# Patient Record
Sex: FEMALE | Race: WHITE | NOT HISPANIC OR LATINO | Employment: OTHER | URBAN - METROPOLITAN AREA
[De-identification: names, ages, dates, MRNs, and addresses within clinical notes are randomized per-mention and may not be internally consistent; named-entity substitution may affect disease eponyms.]

---

## 2020-11-05 ENCOUNTER — OFFICE VISIT (OUTPATIENT)
Dept: ORTHOPEDIC SURGERY | Facility: CLINIC | Age: 75
End: 2020-11-05

## 2020-11-05 VITALS
HEART RATE: 71 BPM | BODY MASS INDEX: 28.35 KG/M2 | DIASTOLIC BLOOD PRESSURE: 68 MMHG | WEIGHT: 160 LBS | HEIGHT: 63 IN | SYSTOLIC BLOOD PRESSURE: 112 MMHG

## 2020-11-05 DIAGNOSIS — M25.511 ACUTE PAIN OF RIGHT SHOULDER: Primary | ICD-10-CM

## 2020-11-05 DIAGNOSIS — M19.111 POST-TRAUMATIC OSTEOARTHRITIS, RIGHT SHOULDER: ICD-10-CM

## 2020-11-05 PROCEDURE — 99203 OFFICE O/P NEW LOW 30 MIN: CPT | Performed by: NURSE PRACTITIONER

## 2020-11-05 PROCEDURE — 73030 X-RAY EXAM OF SHOULDER: CPT | Performed by: NURSE PRACTITIONER

## 2020-11-05 RX ORDER — NITROGLYCERIN 0.4 MG/1
0.4 TABLET SUBLINGUAL
COMMUNITY

## 2020-11-05 RX ORDER — INFLUENZA A VIRUS A/MICHIGAN/45/2015 X-275 (H1N1) ANTIGEN (FORMALDEHYDE INACTIVATED), INFLUENZA A VIRUS A/SINGAPORE/INFIMH-16-0019/2016 IVR-186 (H3N2) ANTIGEN (FORMALDEHYDE INACTIVATED), INFLUENZA B VIRUS B/PHUKET/3073/2013 ANTIGEN (FORMALDEHYDE INACTIVATED), AND INFLUENZA B VIRUS B/MARYLAND/15/2016 BX-69A ANTIGEN (FORMALDEHYDE INACTIVATED) 60; 60; 60; 60 UG/.7ML; UG/.7ML; UG/.7ML; UG/.7ML
INJECTION, SUSPENSION INTRAMUSCULAR
COMMUNITY
Start: 2020-10-04 | End: 2021-01-29

## 2020-11-05 RX ORDER — SIMVASTATIN 10 MG
10 TABLET ORAL EVERY OTHER DAY
COMMUNITY
Start: 2020-10-15

## 2020-11-05 RX ORDER — LOSARTAN POTASSIUM AND HYDROCHLOROTHIAZIDE 12.5; 1 MG/1; MG/1
1 TABLET ORAL EVERY MORNING
COMMUNITY
Start: 2020-10-19

## 2020-11-05 RX ORDER — FAMOTIDINE 40 MG/1
40 TABLET, FILM COATED ORAL NIGHTLY
COMMUNITY
Start: 2020-10-15

## 2020-11-05 RX ORDER — PREDNISONE 1 MG/1
TABLET ORAL
Qty: 21 TABLET | Refills: 0 | Status: SHIPPED | OUTPATIENT
Start: 2020-11-05 | End: 2021-01-29

## 2020-11-05 NOTE — PROGRESS NOTES
Subjective:     Patient ID: Britta Armijo is a 75 y.o. female.    Chief Complaint:    History of Present Illness  Britta Armijo       Social History     Occupational History   • Not on file   Tobacco Use   • Smoking status: Not on file   Substance and Sexual Activity   • Alcohol use: Not on file   • Drug use: Not on file   • Sexual activity: Not on file      No past medical history on file.  Past Surgical History:   Procedure Laterality Date   • HIP SURGERY Right     Replacement       No family history on file.      Review of Systems   Constitutional: Negative for chills, diaphoresis and unexpected weight change.   HENT: Negative for hearing loss, nosebleeds, sore throat and tinnitus.    Eyes: Negative for pain and visual disturbance.   Respiratory: Negative for cough, shortness of breath and wheezing.    Cardiovascular: Negative for chest pain and palpitations.   Gastrointestinal: Negative for abdominal pain, diarrhea, nausea and vomiting.   Endocrine: Negative for cold intolerance, heat intolerance and polydipsia.   Genitourinary: Negative for difficulty urinating, dyspareunia and hematuria.   Musculoskeletal: Positive for arthralgias and myalgias.   Skin: Negative for rash and wound.   Allergic/Immunologic: Negative for environmental allergies.   Neurological: Negative for dizziness, syncope and numbness.   Hematological: Does not bruise/bleed easily.   Psychiatric/Behavioral: Negative for dysphoric mood and sleep disturbance. The patient is not nervous/anxious.            Objective:  Physical Exam    Vital signs reviewed.   General: No acute distress.  Eyes: conjunctiva clear; pupils equally round and reactive  ENT: external ears and nose atraumatic; oropharynx clear  CV: no peripheral edema  Resp: normal respiratory effort  Skin: no rashes or wounds; normal turgor  Psych: mood and affect appropriate; recent and remote memory intact    There were no vitals filed for this visit.  There were no vitals filed  for this visit.  There is no height or weight on file to calculate BMI.      Ortho Exam         Imaging:    Assessment:      No diagnosis found.       Plan:  1.   Orders:  No orders of the defined types were placed in this encounter.    No orders of the defined types were placed in this encounter.          I ordered and reviewed the KAREN today.     Dictated utilizing Dragon dictation

## 2020-11-05 NOTE — PROGRESS NOTES
Subjective:     Patient ID: Britta Armijo is a 75 y.o. female.    Chief Complaint:  Right shoulder pain, new patient to examiner   History of Present Illness  Britta Armijo 75-year-old female presents to clinic with friend for evaluation of her right shoulder.  Injured shoulder approximately 1-1/2 years ago after she fell over a stump going out of a restaurant in [place] striking the lateral aspect of her shoulder.  At the time presented to hospital in [facility] x-rays were completed was determined she had a fracture she did complete physical therapy no surgeries were completed.  Did receive a steroid injection approximately 1 year ago without any significant symptom relief.  Rates discomfort a 10 out of a 10 describes pain as aching, throbbing in nature increased pain noted with forward flexion, external rotation, attempting to reach behind her back in all motions out to side.  She is right-hand dominant does occasionally experience numbness to the first digit of bilateral hands.  She is unable to tolerate oral NSAID secondary to GI upset.  Currently taking Tylenol with minimal symptom relief.  She is also had excision of a benign tumor at antecubital fossa approximately 1 year ago which is completely unrelated to injury.  Has recently moved back to [place] has been seen by primary care is referred her to our office.  Maximal tenderness present the anterior, lateral, posterior aspect of the shoulder.  Denies any prior x-ray imaging within the last 1 year, MRI or CT.  Denies other concerns present this time.     Social History     Occupational History   • Not on file   Tobacco Use   • Smoking status: Never Smoker   Substance and Sexual Activity   • Alcohol use: Never     Frequency: Never   • Drug use: Not on file   • Sexual activity: Not on file      Past Medical History:   Diagnosis Date   • Arthritis of back    • Fracture, humerus    • Heart attack (CMS/HCC)    • Lumbosacral disc disease      Past  "Surgical History:   Procedure Laterality Date   • BREAST SURGERY     • HIP SURGERY Right     Replacement       Family History   Problem Relation Age of Onset   • Cancer Father         spine   • Diabetes Paternal Aunt    • Diabetes Paternal Uncle          Review of Systems   Constitutional: Negative for chills, diaphoresis and unexpected weight change.   HENT: Negative for hearing loss, nosebleeds, sore throat and tinnitus.    Eyes: Negative for pain and visual disturbance.   Respiratory: Negative for cough, shortness of breath and wheezing.    Cardiovascular: Negative for chest pain and palpitations.   Gastrointestinal: Negative for abdominal pain, diarrhea, nausea and vomiting.   Endocrine: Negative for cold intolerance, heat intolerance and polydipsia.   Genitourinary: Negative for difficulty urinating, dyspareunia and hematuria.   Musculoskeletal: Positive for joint swelling and myalgias. Negative for arthralgias.   Skin: Negative for rash and wound.   Allergic/Immunologic: Negative for environmental allergies.   Neurological: Negative for dizziness, syncope and numbness.   Hematological: Does not bruise/bleed easily.   Psychiatric/Behavioral: Negative for dysphoric mood and sleep disturbance. The patient is not nervous/anxious.            Objective:  Physical Exam    Vital signs reviewed.   General: No acute distress.  Eyes: conjunctiva clear; pupils equally round and reactive  ENT: external ears and nose atraumatic; oropharynx clear  CV: no peripheral edema  Resp: normal respiratory effort  Skin: no rashes or wounds; normal turgor  Psych: mood and affect appropriate; recent and remote memory intact    Vitals:    11/05/20 1447   BP: 112/68   Pulse: 71   Weight: 72.6 kg (160 lb)   Height: 160 cm (63\")         11/05/20  1447   Weight: 72.6 kg (160 lb)     Body mass index is 28.34 kg/m².      Right Shoulder Exam     Tenderness   The patient is experiencing tenderness in the acromion.    Range of Motion   External " rotation: 40   Forward flexion: 90     Muscle Strength   Internal rotation: 4/5   External rotation: 4/5   Supraspinatus: 3/5   Subscapularis: 3/5   Biceps: 3/5     Other   Erythema: absent  Sensation: normal  Pulse: present    Comments:  Internal rotation to side   Limited exam due to decreased range of motion, increased pain with motion   Mildly positive empty can  positive [place]'s  Mildly positive Speed's  negative bear hug exam                   Imaging:  Right Shoulder X-Ray  Indication: Pain  AP Internal and External Rotation views    Findings:  No fracture  Normal soft tissues  Collapsed humeral head, AC joint arthropathy, cysts affected moderate to advanced glenohumeral osteoarthritis with osteophyte inferior aspect glenohumeral joint     No prior studies were available for comparison.    Assessment:        1. Acute pain of right shoulder    2. Post-traumatic osteoarthritis, right shoulder           Plan:  1. Discussed plan of care with patient. Will proceed with MRI to evaluate for possible AVN and rotator cuff. Will plan to see her back after completion of testing to discuss results and further plan of care. Patient verbalized understanding of all information and agrees with plan of care. Denies all other concerns present at this time.     Orders:  Orders Placed This Encounter   Procedures   • XR Shoulder 2+ View Right   • MRI Shoulder Right Without Contrast       Medications:  New Medications Ordered This Visit   Medications   • predniSONE (DELTASONE) 5 MG tablet     Si tablets day 1, 5 tablets day 2, 4 tablets day 3, 3 tablets day 4, 2 tablets day 5, 1 tablet day 6     Dispense:  21 tablet     Refill:  0       Followup:  No follow-ups on file.    Diagnoses and all orders for this visit:    1. Acute pain of right shoulder (Primary)  -     XR Shoulder 2+ View Right    2. Post-traumatic osteoarthritis, right shoulder  -     MRI Shoulder Right Without Contrast; Future    Other orders  -     predniSONE  (DELTASONE) 5 MG tablet; 6 tablets day 1, 5 tablets day 2, 4 tablets day 3, 3 tablets day 4, 2 tablets day 5, 1 tablet day 6  Dispense: 21 tablet; Refill: 0          I ordered and reviewed the KAREN today.     Dictated utilizing Dragon dictation

## 2020-11-16 ENCOUNTER — OFFICE VISIT (OUTPATIENT)
Dept: ORTHOPEDIC SURGERY | Facility: CLINIC | Age: 75
End: 2020-11-16

## 2020-11-16 VITALS — HEIGHT: 63 IN | WEIGHT: 161 LBS | BODY MASS INDEX: 28.53 KG/M2

## 2020-11-16 DIAGNOSIS — M19.111 POST-TRAUMATIC OSTEOARTHRITIS, RIGHT SHOULDER: Primary | ICD-10-CM

## 2020-11-16 DIAGNOSIS — M25.511 ACUTE PAIN OF RIGHT SHOULDER: ICD-10-CM

## 2020-11-16 PROCEDURE — 99214 OFFICE O/P EST MOD 30 MIN: CPT | Performed by: NURSE PRACTITIONER

## 2020-11-16 PROCEDURE — 20610 DRAIN/INJ JOINT/BURSA W/O US: CPT | Performed by: NURSE PRACTITIONER

## 2020-11-16 RX ORDER — TRIAMCINOLONE ACETONIDE 40 MG/ML
80 INJECTION, SUSPENSION INTRA-ARTICULAR; INTRAMUSCULAR
Status: COMPLETED | OUTPATIENT
Start: 2020-11-16 | End: 2020-11-16

## 2020-11-16 RX ORDER — CIPROFLOXACIN 250 MG/1
TABLET, FILM COATED ORAL
COMMUNITY
Start: 2020-11-13 | End: 2021-01-29

## 2020-11-16 RX ORDER — LIDOCAINE HYDROCHLORIDE 10 MG/ML
4 INJECTION, SOLUTION EPIDURAL; INFILTRATION; INTRACAUDAL; PERINEURAL
Status: COMPLETED | OUTPATIENT
Start: 2020-11-16 | End: 2020-11-16

## 2020-11-16 RX ADMIN — LIDOCAINE HYDROCHLORIDE 4 ML: 10 INJECTION, SOLUTION EPIDURAL; INFILTRATION; INTRACAUDAL; PERINEURAL at 11:30

## 2020-11-16 RX ADMIN — TRIAMCINOLONE ACETONIDE 80 MG: 40 INJECTION, SUSPENSION INTRA-ARTICULAR; INTRAMUSCULAR at 11:30

## 2020-11-16 NOTE — PROGRESS NOTES
Subjective:     Patient ID: Britta Armijo is a 75 y.o. female.    Chief Complaint:  Follow-up Post-traumatic osteoarthritis, right shoulder   History of Present Illness  Britta Armijo returns to clinic with caregiver for evaluation of her right shoulder.  Continues to experience severe pain at the right shoulder glenohumeral joint with pain radiating inferiorly. Injured shoulder approximately 1-1/2 years ago after she fell over a stump going out of a restaurant in [place] striking the lateral aspect of her shoulder.  At the time presented to hospital in [facility] x-rays were completed was determined she had a fracture she did complete physical therapy no surgeries were completed.  Did receive a steroid injection approximately 1 year ago without any significant symptom relief.  Rates discomfort a 10 out of a 10 describes pain as aching, throbbing in nature increased pain noted with forward flexion, external rotation, attempting to reach behind her back in all motions out to side.  She is right-hand dominant does occasionally experience numbness to the first digit of bilateral hands.  She is unable to tolerate oral NSAID secondary to GI upset.  Currently taking Tylenol with minimal symptom relief.  She is also had excision of a benign tumor at antecubital fossa approximately 1 year ago which is completely unrelated to injury.  Has recently moved back to [place] has been seen by primary care is referred her to our office.  Maximal tenderness present the anterior, lateral, posterior aspect of the shoulder.  She is currently awaiting MRI of the shoulder to evaluate rotator cuff tear.  Denies other concerns present this time.    Social History     Occupational History   • Not on file   Tobacco Use   • Smoking status: Never Smoker   Substance and Sexual Activity   • Alcohol use: Never     Frequency: Never   • Drug use: Not on file   • Sexual activity: Not on file      Past Medical History:   Diagnosis Date   •  "Arthritis of back    • Fracture, humerus    • Heart attack (CMS/HCC)    • Lumbosacral disc disease      Past Surgical History:   Procedure Laterality Date   • BREAST SURGERY     • HIP SURGERY Right     Replacement       Family History   Problem Relation Age of Onset   • Cancer Father         spine   • Diabetes Paternal Aunt    • Diabetes Paternal Uncle          Review of Systems   Constitutional: Negative for chills, diaphoresis and unexpected weight change.   HENT: Negative for hearing loss, nosebleeds, sore throat and tinnitus.    Eyes: Negative for pain and visual disturbance.   Respiratory: Negative for cough, shortness of breath and wheezing.    Cardiovascular: Negative for chest pain and palpitations.   Gastrointestinal: Negative for abdominal pain, diarrhea, nausea and vomiting.   Endocrine: Negative for cold intolerance, heat intolerance and polydipsia.   Genitourinary: Negative for difficulty urinating, dyspareunia and hematuria.   Musculoskeletal: Positive for arthralgias and myalgias.   Skin: Negative for rash and wound.   Allergic/Immunologic: Negative for environmental allergies.   Neurological: Negative for dizziness, syncope and numbness.   Hematological: Does not bruise/bleed easily.   Psychiatric/Behavioral: Negative for dysphoric mood and sleep disturbance. The patient is not nervous/anxious.            Objective:  Physical Exam    Vital signs reviewed.   General: No acute distress.  Eyes: conjunctiva clear; pupils equally round and reactive  ENT: external ears and nose atraumatic; oropharynx clear  CV: no peripheral edema  Resp: normal respiratory effort  Skin: no rashes or wounds; normal turgor  Psych: mood and affect appropriate; recent and remote memory intact    Vitals:    11/16/20 1111   Weight: 73 kg (161 lb)   Height: 160 cm (63\")         11/16/20  1111   Weight: 73 kg (161 lb)     Body mass index is 28.52 kg/m².      Ortho Exam     Right Shoulder Exam      Tenderness   The patient is " experiencing tenderness in the acromion.     Range of Motion   External rotation: 40   Forward flexion: 90      Muscle Strength   Internal rotation: 4/5   External rotation: 4/5   Supraspinatus: 3/5   Subscapularis: 3/5   Biceps: 3/5      Other   Erythema: absent  Sensation: normal  Pulse: present     Comments:  Internal rotation to side   Limited exam due to decreased range of motion, increased pain with motion   Mildly positive empty can  positive [place]'s  Mildly positive Speed's  negative bear hug exam    Assessment:        1. Post-traumatic osteoarthritis, right shoulder    2. Acute pain of right shoulder           Plan:  1. Discussed plan of care with patient.  Long discussion with patient regarding treatment options.  At this time given the severity of the pain she does wish to proceed with corticosteroid injection the right shoulder.  We will plan to proceed with the MRI as previously scheduled.  Plan to see her back in clinic after completion of testing discussed results as well as plan of care.  Also discussed topical application of Voltaren 4 times daily.  She verbalized understanding of information agrees with plan of care.  Denies all other concerns present this time.    Large Joint Arthrocentesis: R glenohumeral  Date/Time: 11/16/2020 11:30 AM  Consent given by: patient  Site marked: site marked  Timeout: Immediately prior to procedure a time out was called to verify the correct patient, procedure, equipment, support staff and site/side marked as required   Supporting Documentation  Indications: pain and joint swelling   Procedure Details  Location: shoulder - R glenohumeral  Preparation: Patient was prepped and draped in the usual sterile fashion  Needle size: 22 G  Approach: anterior  Medications administered: 4 mL lidocaine PF 1% 1 %; 80 mg triamcinolone acetonide 40 MG/ML  Patient tolerance: patient tolerated the procedure well with no immediate complications          Orders:  No orders of the  defined types were placed in this encounter.    No orders of the defined types were placed in this encounter.      Dictated utilizing Dragon dictation

## 2020-12-02 ENCOUNTER — HOSPITAL ENCOUNTER (OUTPATIENT)
Dept: MRI IMAGING | Facility: HOSPITAL | Age: 75
End: 2020-12-02

## 2020-12-11 ENCOUNTER — APPOINTMENT (OUTPATIENT)
Dept: MRI IMAGING | Facility: HOSPITAL | Age: 75
End: 2020-12-11

## 2021-01-05 ENCOUNTER — HOSPITAL ENCOUNTER (OUTPATIENT)
Dept: MRI IMAGING | Facility: HOSPITAL | Age: 76
Discharge: HOME | End: 2021-01-05
Admitting: NURSE PRACTITIONER

## 2021-01-05 DIAGNOSIS — M19.111 POST-TRAUMATIC OSTEOARTHRITIS, RIGHT SHOULDER: ICD-10-CM

## 2021-01-05 PROCEDURE — 73221 MRI JOINT UPR EXTREM W/O DYE: CPT

## 2021-01-07 ENCOUNTER — TELEPHONE (OUTPATIENT)
Dept: ORTHOPEDIC SURGERY | Facility: CLINIC | Age: 76
End: 2021-01-07

## 2021-01-07 NOTE — TELEPHONE ENCOUNTER
LM FOR PATIENT TO CALL AND CONFIRM APPOINTMENT WITH DR. GARCIA    ----- Message from МАРИНА Hui sent at 1/7/2021 10:28 AM EST -----  Can you contact this patient and have her follow-up with Pierce at next available? Post-traumatic osteoarthritis right shoulder to discuss surgery. X-rays here in clinic, MRI in chart. Please and thank you

## 2021-01-29 ENCOUNTER — OFFICE VISIT (OUTPATIENT)
Dept: ORTHOPEDIC SURGERY | Facility: CLINIC | Age: 76
End: 2021-01-29

## 2021-01-29 VITALS — BODY MASS INDEX: 28.35 KG/M2 | HEIGHT: 63 IN | WEIGHT: 160 LBS

## 2021-01-29 DIAGNOSIS — M19.111 POST-TRAUMATIC OSTEOARTHRITIS, RIGHT SHOULDER: Primary | ICD-10-CM

## 2021-01-29 PROCEDURE — 99214 OFFICE O/P EST MOD 30 MIN: CPT | Performed by: ORTHOPAEDIC SURGERY

## 2021-01-29 RX ORDER — MELOXICAM 7.5 MG/1
15 TABLET ORAL ONCE
Status: CANCELLED | OUTPATIENT
Start: 2021-01-29 | End: 2021-01-29

## 2021-01-29 RX ORDER — PREGABALIN 150 MG/1
150 CAPSULE ORAL ONCE
Status: CANCELLED | OUTPATIENT
Start: 2021-01-29 | End: 2021-01-29

## 2021-01-29 RX ORDER — ESCITALOPRAM OXALATE 20 MG/1
20 TABLET ORAL EVERY MORNING
COMMUNITY
Start: 2021-01-20

## 2021-01-29 RX ORDER — ACETAMINOPHEN 325 MG/1
1000 TABLET ORAL ONCE
Status: CANCELLED | OUTPATIENT
Start: 2021-01-29 | End: 2021-01-29

## 2021-01-29 NOTE — PROGRESS NOTES
Subjective:     Patient ID: Britta Armijo is a 75 y.o. female.    Chief Complaint:  Right shoulder pain, new patient to examiner  History of Present Illness  Britta Armijo presents to clinic today for evaluation of right shoulder, states that she initially injured her shoulder about 2 years ago when she was in [place], fell in a restaurant and landed on her right upper extremity, noted onset of pain at that point time.  Initial imaging indicated proximal humerus fracture and per report she was recommended for closed treatment and proceed with physical therapy.  Her pain is progressively become worse over the last several years but particularly over a year ago, she did have an injection 1 year ago and then again 3 months ago by Chantell Hood.  She noted minimal improvement of her pain and it only lasted for about 2 weeks.  Localizes majority the pain to the anterior posterior joint line with associated crepitus on any attempts of range of motion, she notes significantly limited motion and strength of her right arm.  Denies any fevers chills or sweats, does note some radiation of pain down the lateral aspect of the arm but not below the elbow.  Denies associated numbness or tingling.     Social History     Occupational History   • Not on file   Tobacco Use   • Smoking status: Never Smoker   Substance and Sexual Activity   • Alcohol use: Never     Frequency: Never   • Drug use: Not on file   • Sexual activity: Not on file      Past Medical History:   Diagnosis Date   • Arthritis of back    • Fracture, humerus    • Heart attack (CMS/HCC)    • Lumbosacral disc disease      Past Surgical History:   Procedure Laterality Date   • BREAST SURGERY     • HIP SURGERY Right     Replacement       Family History   Problem Relation Age of Onset   • Cancer Father         spine   • Diabetes Paternal Aunt    • Diabetes Paternal Uncle          Review of Systems   Constitutional: Negative for chills, diaphoresis, fever and unexpected  "weight change.   HENT: Negative for hearing loss, nosebleeds, sore throat and tinnitus.    Eyes: Negative for pain and visual disturbance.   Respiratory: Negative for cough, shortness of breath and wheezing.    Cardiovascular: Negative for chest pain and palpitations.   Gastrointestinal: Negative for abdominal pain, diarrhea, nausea and vomiting.   Endocrine: Negative for cold intolerance, heat intolerance and polydipsia.   Genitourinary: Negative for difficulty urinating, dysuria and hematuria.   Musculoskeletal: Positive for arthralgias. Negative for joint swelling and myalgias.   Skin: Negative for rash and wound.   Allergic/Immunologic: Negative for environmental allergies.   Neurological: Negative for dizziness, syncope and numbness.   Hematological: Does not bruise/bleed easily.   Psychiatric/Behavioral: Negative for dysphoric mood and sleep disturbance. The patient is not nervous/anxious.    All other systems reviewed and are negative.          Objective:  Vitals:    01/29/21 0945   Weight: 72.6 kg (160 lb)   Height: 160 cm (63\")         01/29/21  0945   Weight: 72.6 kg (160 lb)     Body mass index is 28.34 kg/m².  Physical Exam    Vital signs reviewed.   General: No acute distress, alert and oriented  Eyes: conjunctiva clear; pupils equally round and reactive  ENT: external ears and nose atraumatic; oropharynx clear  CV: no peripheral edema  Resp: normal respiratory effort  Skin: no rashes or wounds; normal turgor  Psych: mood and affect appropriate; recent and remote memory intact          Ortho Exam     Neck -  negative midline or paraspinal tenderness              negative Spurling exam      right shoulder-  Tenderness  located anterior, posterior  FF-   Active-50   Passive-70   Strength- 3/5  ER-      Active-15   Passive 25   Strength- 3/5  IR        S1   Strength- 4/5  Positive deltoid firing all 3 components  Positive sensation light touch proximal lateral aspect right arm symmetric to the left  Bear " hug sign-positive  Empty can test- positive    Drop arm test- positive  Ext rotation lag sign- positive    Neer's sign- positive  Anne- positive    Cross arm test- not done  Tenderness over AC joint- not done    Brisk cap refill to all digits, palpable radial pulse    Positive sensation to light touch palmar, dorsal aspects of small and index fingers and anatomic snuffbox right hand      Imaging:  Review of prior x-rays right shoulder 3 views from November 5, 2020 from office including AP, lateral, scaphoid views indicates significant humeral head collapse and posttraumatic osteoarthritis of the glenohumeral joint, may be consistent with old avascular necrosis versus untreated proximal humeral fracture    Assessment:        1. Post-traumatic osteoarthritis, right shoulder           Plan:          1. Discussed treatment options at length with patient at today's visit.  Given significant posttraumatic degenerative change and collapse of humeral head as well as persistent pain and significant limitation use of the arm we discussed options at length and patient wished to proceed with reverse shoulder arthroplasty at this time.  We will obtain a CT scan for preoperative planning purposes.  I did discuss at length with patient and her family with her today office visit that I cannot guarantee how much improvement she will get in her motion and her strength given the fact that she has been very restricted in both of these regards for a long period of time.  She understood this but does still wish to proceed with surgical intervention.  2. Patient wishes to proceed with reverse shoulder arthroplasty at this point in time.  I discussed with with the patient the specific indication of a reverse shoulder arthroplasty particularly for shoulder pain as well as for pseudoparalysis, and reviewed anatomy of the shoulder as well as a model of the implant.  I reviewed details of the procedure as well as risks, benefits, and  alternatives with the risks including but not limited to neurovascular damage, bleeding, infection, periprosthetic fracture, chronic pain, instability, loosening of implant, failure of implant, loss of motion, weakness, stiffness, DVT, pulmonary embolus, death, stroke, complex regional pain syndrome, myocardial infarction, need for additional procedures.  Patient understood all these had all questions answered today.  We will have patient medically optimized by primary care physician and plan on proceeding with surgery at next available date.  Patient understood all this had all questions answered.  No guarantees were given in regards to results of the surgery.  3. Patient denies history of DVT or pulmonary embolus, denies cardiac history, she is not diabetic.      Britta Armijo was in agreement with plan and had all questions answered.     Orders:  Orders Placed This Encounter   Procedures   • MRSA Screen Culture (Outpatient) - Swab, Nares   • CT shoulder right wo contrast   • Basic metabolic panel   • Protime-INR   • APTT   • Urinalysis With Culture If Indicated -   • Consult Primary Care Physician for Medical Clearance   • Follow Anesthesia Guidelines / Standing Orders   • Provide instructions to patient regarding NPO status   • Care Order / Instruction for all Female Patients   • Provide NPO Instructions to Patient   • ECG 12 Lead   • Type and screen   • CBC and Differential       Medications:  No orders of the defined types were placed in this encounter.      Followup:  No follow-ups on file.    Diagnoses and all orders for this visit:    1. Post-traumatic osteoarthritis, right shoulder (Primary)  -     CT shoulder right wo contrast; Future  -     Case Request; Standing  -     Consult Primary Care Physician for Medical Clearance  -     CBC and Differential; Future  -     Basic metabolic panel; Future  -     Protime-INR; Future  -     APTT; Future  -     MRSA Screen Culture (Outpatient) - Swab, Nares;  Future  -     Urinalysis With Culture If Indicated -; Future  -     ECG 12 Lead; Future  -     Type and screen; Future  -     Tranexamic Acid 1,000 mg in sodium chloride 0.9 % 100 mL  -     Tranexamic Acid 1,000 mg in sodium chloride 0.9 % 100 mL  -     vancomycin (VANCOCIN) 1,000 mg in sodium chloride 0.9 % 250 mL IVPB  -     acetaminophen (TYLENOL) tablet 975 mg  -     meloxicam (MOBIC) tablet 15 mg  -     pregabalin (LYRICA) capsule 150 mg  -     Case Request    Other orders  -     Inpatient Admission; Standing  -     Follow Anesthesia Guidelines / Standing Orders; Future  -     Provide instructions to patient regarding NPO status  -     Care Order / Instruction for all Female Patients  -     Follow Anesthesia Guidelines / Standing Orders; Standing  -     Verify NPO Status; Standing  -     Clip operative site; Standing  -     Obtain informed consent (if not collected inpatient or PAT); Standing  -     Provide NPO Instructions to Patient          Dictated utilizing Dragon dictation

## 2021-02-08 ENCOUNTER — APPOINTMENT (OUTPATIENT)
Dept: CT IMAGING | Facility: HOSPITAL | Age: 76
End: 2021-02-08

## 2021-02-12 ENCOUNTER — HOSPITAL ENCOUNTER (OUTPATIENT)
Dept: CT IMAGING | Facility: HOSPITAL | Age: 76
Discharge: HOME | End: 2021-02-12
Admitting: ORTHOPAEDIC SURGERY

## 2021-02-12 DIAGNOSIS — M19.111 POST-TRAUMATIC OSTEOARTHRITIS, RIGHT SHOULDER: ICD-10-CM

## 2021-02-12 PROCEDURE — 73200 CT UPPER EXTREMITY W/O DYE: CPT

## 2021-02-23 ENCOUNTER — TRANSCRIBE ORDERS (OUTPATIENT)
Dept: ADMINISTRATIVE | Facility: HOSPITAL | Age: 76
End: 2021-02-23

## 2021-02-23 DIAGNOSIS — U07.1 COVID-19: Primary | ICD-10-CM

## 2021-03-09 ENCOUNTER — OFFICE VISIT (OUTPATIENT)
Dept: ORTHOPEDIC SURGERY | Facility: CLINIC | Age: 76
End: 2021-03-09

## 2021-03-09 ENCOUNTER — APPOINTMENT (OUTPATIENT)
Dept: PREADMISSION TESTING | Facility: HOSPITAL | Age: 76
End: 2021-03-09

## 2021-03-09 VITALS
RESPIRATION RATE: 16 BRPM | HEART RATE: 77 BPM | DIASTOLIC BLOOD PRESSURE: 82 MMHG | WEIGHT: 166.4 LBS | HEIGHT: 63 IN | OXYGEN SATURATION: 98 % | SYSTOLIC BLOOD PRESSURE: 138 MMHG | BODY MASS INDEX: 29.48 KG/M2

## 2021-03-09 VITALS — WEIGHT: 160.05 LBS | BODY MASS INDEX: 28.36 KG/M2 | HEIGHT: 63 IN

## 2021-03-09 DIAGNOSIS — M19.111 POST-TRAUMATIC OSTEOARTHRITIS, RIGHT SHOULDER: ICD-10-CM

## 2021-03-09 DIAGNOSIS — M19.111 POST-TRAUMATIC OSTEOARTHRITIS, RIGHT SHOULDER: Primary | ICD-10-CM

## 2021-03-09 PROBLEM — N60.92 ATYPICAL DUCTAL HYPERPLASIA OF LEFT BREAST: Status: ACTIVE | Noted: 2020-05-18

## 2021-03-09 LAB
ABO GROUP BLD: NORMAL
ABO GROUP BLD: NORMAL
ANION GAP SERPL CALCULATED.3IONS-SCNC: 9 MMOL/L (ref 5–15)
APTT PPP: 36 SECONDS (ref 24.3–38.1)
BACTERIA UR QL AUTO: ABNORMAL /HPF
BASOPHILS # BLD AUTO: 0.03 10*3/MM3 (ref 0–0.2)
BASOPHILS NFR BLD AUTO: 0.4 % (ref 0–1.5)
BILIRUB UR QL STRIP: NEGATIVE
BLD GP AB SCN SERPL QL: NEGATIVE
BUN SERPL-MCNC: 16 MG/DL (ref 8–23)
BUN/CREAT SERPL: 14.5 (ref 7–25)
CALCIUM SPEC-SCNC: 9.4 MG/DL (ref 8.6–10.5)
CHLORIDE SERPL-SCNC: 95 MMOL/L (ref 98–107)
CLARITY UR: CLEAR
CO2 SERPL-SCNC: 28 MMOL/L (ref 22–29)
COLOR UR: ABNORMAL
CREAT SERPL-MCNC: 1.1 MG/DL (ref 0.57–1)
DEPRECATED RDW RBC AUTO: 43.9 FL (ref 37–54)
EOSINOPHIL # BLD AUTO: 0.23 10*3/MM3 (ref 0–0.4)
EOSINOPHIL NFR BLD AUTO: 2.8 % (ref 0.3–6.2)
ERYTHROCYTE [DISTWIDTH] IN BLOOD BY AUTOMATED COUNT: 12.4 % (ref 12.3–15.4)
GFR SERPL CREATININE-BSD FRML MDRD: 49 ML/MIN/1.73
GLUCOSE SERPL-MCNC: 138 MG/DL (ref 65–99)
GLUCOSE UR STRIP-MCNC: NEGATIVE MG/DL
HCT VFR BLD AUTO: 39.4 % (ref 34–46.6)
HGB BLD-MCNC: 12.2 G/DL (ref 12–15.9)
HGB UR QL STRIP.AUTO: ABNORMAL
HYALINE CASTS UR QL AUTO: ABNORMAL /LPF
IMM GRANULOCYTES # BLD AUTO: 0.01 10*3/MM3 (ref 0–0.05)
IMM GRANULOCYTES NFR BLD AUTO: 0.1 % (ref 0–0.5)
INR PPP: 1.05 (ref 0.9–1.1)
KETONES UR QL STRIP: NEGATIVE
LEUKOCYTE ESTERASE UR QL STRIP.AUTO: ABNORMAL
LYMPHOCYTES # BLD AUTO: 2.18 10*3/MM3 (ref 0.7–3.1)
LYMPHOCYTES NFR BLD AUTO: 26.3 % (ref 19.6–45.3)
MCH RBC QN AUTO: 29.7 PG (ref 26.6–33)
MCHC RBC AUTO-ENTMCNC: 31 G/DL (ref 31.5–35.7)
MCV RBC AUTO: 95.9 FL (ref 79–97)
MONOCYTES # BLD AUTO: 0.48 10*3/MM3 (ref 0.1–0.9)
MONOCYTES NFR BLD AUTO: 5.8 % (ref 5–12)
NEUTROPHILS NFR BLD AUTO: 5.36 10*3/MM3 (ref 1.7–7)
NEUTROPHILS NFR BLD AUTO: 64.6 % (ref 42.7–76)
NITRITE UR QL STRIP: NEGATIVE
PH UR STRIP.AUTO: 7 [PH] (ref 4.5–8)
PLATELET # BLD AUTO: 325 10*3/MM3 (ref 140–450)
PMV BLD AUTO: 9.1 FL (ref 6–12)
POTASSIUM SERPL-SCNC: 4 MMOL/L (ref 3.5–5.2)
PROT UR QL STRIP: NEGATIVE
PROTHROMBIN TIME: 13.4 SECONDS (ref 12.1–15)
QT INTERVAL: 414 MS
RBC # BLD AUTO: 4.11 10*6/MM3 (ref 3.77–5.28)
RBC # UR: ABNORMAL /HPF
REF LAB TEST METHOD: ABNORMAL
RH BLD: POSITIVE
RH BLD: POSITIVE
SODIUM SERPL-SCNC: 132 MMOL/L (ref 136–145)
SP GR UR STRIP: 1.02 (ref 1–1.03)
SQUAMOUS #/AREA URNS HPF: ABNORMAL /HPF
T&S EXPIRATION DATE: NORMAL
UROBILINOGEN UR QL STRIP: ABNORMAL
WBC # BLD AUTO: 8.29 10*3/MM3 (ref 3.4–10.8)
WBC UR QL AUTO: ABNORMAL /HPF

## 2021-03-09 PROCEDURE — 87081 CULTURE SCREEN ONLY: CPT | Performed by: ORTHOPAEDIC SURGERY

## 2021-03-09 PROCEDURE — 93005 ELECTROCARDIOGRAM TRACING: CPT

## 2021-03-09 PROCEDURE — 86901 BLOOD TYPING SEROLOGIC RH(D): CPT | Performed by: ORTHOPAEDIC SURGERY

## 2021-03-09 PROCEDURE — 93010 ELECTROCARDIOGRAM REPORT: CPT | Performed by: INTERNAL MEDICINE

## 2021-03-09 PROCEDURE — 86900 BLOOD TYPING SEROLOGIC ABO: CPT

## 2021-03-09 PROCEDURE — 81001 URINALYSIS AUTO W/SCOPE: CPT | Performed by: ORTHOPAEDIC SURGERY

## 2021-03-09 PROCEDURE — 87086 URINE CULTURE/COLONY COUNT: CPT

## 2021-03-09 PROCEDURE — 86850 RBC ANTIBODY SCREEN: CPT | Performed by: ORTHOPAEDIC SURGERY

## 2021-03-09 PROCEDURE — 85025 COMPLETE CBC W/AUTO DIFF WBC: CPT | Performed by: ORTHOPAEDIC SURGERY

## 2021-03-09 PROCEDURE — 86901 BLOOD TYPING SEROLOGIC RH(D): CPT

## 2021-03-09 PROCEDURE — 85730 THROMBOPLASTIN TIME PARTIAL: CPT | Performed by: ORTHOPAEDIC SURGERY

## 2021-03-09 PROCEDURE — 86900 BLOOD TYPING SEROLOGIC ABO: CPT | Performed by: ORTHOPAEDIC SURGERY

## 2021-03-09 PROCEDURE — 99024 POSTOP FOLLOW-UP VISIT: CPT | Performed by: ORTHOPAEDIC SURGERY

## 2021-03-09 PROCEDURE — 36415 COLL VENOUS BLD VENIPUNCTURE: CPT

## 2021-03-09 PROCEDURE — 85610 PROTHROMBIN TIME: CPT | Performed by: ORTHOPAEDIC SURGERY

## 2021-03-09 PROCEDURE — 80048 BASIC METABOLIC PNL TOTAL CA: CPT | Performed by: ORTHOPAEDIC SURGERY

## 2021-03-09 RX ORDER — ALBUTEROL SULFATE 90 UG/1
2 AEROSOL, METERED RESPIRATORY (INHALATION) EVERY 4 HOURS PRN
COMMUNITY

## 2021-03-09 NOTE — PAT
Patient & cousin here for PAT visit. Labs, EKG complete, scheduled for covid test on 03/19. Pre-op instructions reviewed, verbalizes understanding.

## 2021-03-09 NOTE — H&P (VIEW-ONLY)
History & Physical       Patient: Britta Armijo    YOB: 1945    Medical Record Number: [id number]    Surgeon:  Dr. Derrick Pelayo    Chief Complaints:   Chief Complaint   Patient presents with   • Right Shoulder - Pre-op Exam         History of Present Illness: 75 y.o. female presents with   Chief Complaint   Patient presents with   • Right Shoulder - Pre-op Exam   . Onset of symptoms was 2 years ago and has been progressively worsening despite more conservative treatment measures.  Symptoms are associated with ability to move, lift, push, pull, and perform activities of daily living with affected extremity. Symptoms are aggravated by overhead motion, lifting, and pushing as well as ROM necessary for activities of daily living.   Symptoms improve with rest, ice and elevation only minimally.      Allergies:   Allergies   Allergen Reactions   • Sulfa Antibiotics Nausea And Vomiting and Unknown - Low Severity       Medications:   Home Medications:  Current Outpatient Medications on File Prior to Visit   Medication Sig   • diclofenac (VOLTAREN) 1 % gel gel Apply 4 g topically to the appropriate area as directed 4 (Four) Times a Day.   • escitalopram (LEXAPRO) 20 MG tablet Take 20 mg by mouth Every Morning.   • famotidine (PEPCID) 40 MG tablet Take 40 mg by mouth Every Night.   • losartan-hydrochlorothiazide (HYZAAR) 100-12.5 MG per tablet Take 1 tablet by mouth Every Morning.   • nitroglycerin (Nitrostat) 0.4 MG SL tablet Place 0.4 mg under the tongue Every 5 (Five) Minutes As Needed.   • simvastatin (ZOCOR) 10 MG tablet Take 10 mg by mouth Every Night. TAKES EVERY OTHER NIGHT     No current facility-administered medications on file prior to visit.     Current Medications:  Scheduled Meds:  Continuous Infusions:No current facility-administered medications for this visit.    PRN Meds:.    I have reviewed the patient's medical history in detail and updated the computerized patient record.  Review and  summarization of old records include:    Past Medical History:   Diagnosis Date   • Arthritis of back     NECK & LOWER BACK   • Fracture, humerus    • Generalized anxiety disorder with panic attacks    • GERD (gastroesophageal reflux disease)    • Heart attack (CMS/HCC)     YEARS AGO   • Hyperlipidemia    • Hypertension    • Lumbosacral disc disease    • MRSA (methicillin resistant staph aureus) culture positive 2018    POSITIVE NASAL SWAB PRIOR TO HIP SURGERY   • Osteoporosis    • Right shoulder pain     SCHEDULED FOR TOTAL SHOULDER        Past Surgical History:   Procedure Laterality Date   • APPENDECTOMY     • BREAST LUMPECTOMY Left     BENIGN   • BREAST SURGERY  07/03/2020   • CATARACT EXTRACTION, BILATERAL     • CHOLECYSTECTOMY OPEN     • HEMORRHOIDECTOMY      X4   • HIP SURGERY Right     Replacement   • HYSTERECTOMY  1991   • TUMOR REMOVAL  2017    RIGHT ARM         Social History     Occupational History   • Not on file   Tobacco Use   • Smoking status: Never Smoker   • Smokeless tobacco: Never Used   Substance and Sexual Activity   • Alcohol use: Never   • Drug use: Never   • Sexual activity: Not on file      Social History     Social History Narrative   • Not on file        Family History   Problem Relation Age of Onset   • Cancer Father         spine   • Diabetes Paternal Aunt    • Diabetes Paternal Uncle        ROS: 14 point review of systems was performed and was negative except for documented findings in HPI and today's encounter.     Allergies:   Allergies   Allergen Reactions   • Sulfa Antibiotics Nausea And Vomiting and Unknown - Low Severity     Constitutional:  Denies fever, shaking or chills   Eyes:  Denies change in visual acuity   HENT:  Denies nasal congestion or sore throat   Respiratory:  Denies cough or shortness of breath   Cardiovascular:  Denies chest pain or severe LE edema   GI:  Denies abdominal pain, nausea, vomiting, bloody stools or diarrhea   Musculoskeletal:  Denies numbness  tingling or loss of motor function except as outlined above in history of present illness.  : Denies painful urination or hematuria  Integument:  Denies rash, lesion or ulceration   Neurologic:  Denies headache or focal weakness  Endocrine:  Denies lymphadenopathy  Psych:  Denies confusion or change in mental status   Hem:  Denies active bleeding    Physical Exam: 75 y.o. female  Body mass index is 28.36 kg/m².  There were no vitals filed for this visit.  Vital signs reviewed.   General Appearance:    Alert, cooperative, in no acute distress                  Eyes: conjunctiva clear  ENT: external ears and nose atraumatic  CV: no peripheral edema  Resp: normal respiratory effort  Skin: no rashes or wounds; normal turgor  Psych: mood and affect appropriate  Lymph: no nodes appreciated  Neuro: gross sensation intact  Vascular:  Palpable peripheral pulse in noted extremity  Musculoskeletal Extremities: right shoulder-  Tenderness  located anterior, posterior  FF-       Active-50              Passive-70              Strength- 3/5  ER-      Active-15              Passive 25              Strength- 3/5  IR        S1              Strength- 4/5  Positive deltoid firing all 3 components  Positive sensation light touch proximal lateral aspect right arm symmetric to the left  Bear hug sign-positive  Empty can test- positive     Drop arm test- positive  Ext rotation lag sign- positive     Neer's sign- positive  Anne- positive     Cross arm test- not done  Tenderness over AC joint- not done     Brisk cap refill to all digits, palpable radial pulse     Positive sensation to light touch palmar, dorsal aspects of small and index fingers and anatomic snuffbox right hand    Debilities/Disabilities Identified: None      Diagnostic Tests:  Appointment on 03/09/2021   Component Date Value Ref Range Status   • QT Interval 03/09/2021 414  ms Preliminary   • Color, UA 03/09/2021 Straw  Yellow, Straw Final   • Appearance,  03/09/2021  Clear  Clear Final   • pH, UA 03/09/2021 7.0  4.5 - 8.0 Final   • Specific Gravity, UA 03/09/2021 1.020  1.003 - 1.030 Final   • Glucose, UA 03/09/2021 Negative  Negative Final   • Ketones, UA 03/09/2021 Negative  Negative Final   • Bilirubin, UA 03/09/2021 Negative  Negative Final   • Blood, UA 03/09/2021 Trace* Negative Final   • Protein, UA 03/09/2021 Negative  Negative Final   • Leuk Esterase, UA 03/09/2021 Large (3+)* Negative Final   • Nitrite, UA 03/09/2021 Negative  Negative Final   • Urobilinogen, UA 03/09/2021 0.2 E.U./dL  0.2 - 1.0 E.U./dL Final   • PTT 03/09/2021 36.0  24.3 - 38.1 seconds Final   • Protime 03/09/2021 13.4  12.1 - 15.0 Seconds Final   • INR 03/09/2021 1.05  0.90 - 1.10 Final   • Glucose 03/09/2021 138* 65 - 99 mg/dL Final   • BUN 03/09/2021 16  8 - 23 mg/dL Final   • Creatinine 03/09/2021 1.10* 0.57 - 1.00 mg/dL Final   • Sodium 03/09/2021 132* 136 - 145 mmol/L Final   • Potassium 03/09/2021 4.0  3.5 - 5.2 mmol/L Final   • Chloride 03/09/2021 95* 98 - 107 mmol/L Final   • CO2 03/09/2021 28.0  22.0 - 29.0 mmol/L Final   • Calcium 03/09/2021 9.4  8.6 - 10.5 mg/dL Final   • eGFR Non African Amer 03/09/2021 49* >60 mL/min/1.73 Final   • BUN/Creatinine Ratio 03/09/2021 14.5  7.0 - 25.0 Final   • Anion Gap 03/09/2021 9.0  5.0 - 15.0 mmol/L Final   • WBC 03/09/2021 8.29  3.40 - 10.80 10*3/mm3 Final   • RBC 03/09/2021 4.11  3.77 - 5.28 10*6/mm3 Final   • Hemoglobin 03/09/2021 12.2  12.0 - 15.9 g/dL Final   • Hematocrit 03/09/2021 39.4  34.0 - 46.6 % Final   • MCV 03/09/2021 95.9  79.0 - 97.0 fL Final   • MCH 03/09/2021 29.7  26.6 - 33.0 pg Final   • MCHC 03/09/2021 31.0* 31.5 - 35.7 g/dL Final   • RDW 03/09/2021 12.4  12.3 - 15.4 % Final   • RDW-SD 03/09/2021 43.9  37.0 - 54.0 fl Final   • MPV 03/09/2021 9.1  6.0 - 12.0 fL Final   • Platelets 03/09/2021 325  140 - 450 10*3/mm3 Final   • Neutrophil % 03/09/2021 64.6  42.7 - 76.0 % Final   • Lymphocyte % 03/09/2021 26.3  19.6 - 45.3 % Final   •  Monocyte % 03/09/2021 5.8  5.0 - 12.0 % Final   • Eosinophil % 03/09/2021 2.8  0.3 - 6.2 % Final   • Basophil % 03/09/2021 0.4  0.0 - 1.5 % Final   • Immature Grans % 03/09/2021 0.1  0.0 - 0.5 % Final   • Neutrophils, Absolute 03/09/2021 5.36  1.70 - 7.00 10*3/mm3 Final   • Lymphocytes, Absolute 03/09/2021 2.18  0.70 - 3.10 10*3/mm3 Final   • Monocytes, Absolute 03/09/2021 0.48  0.10 - 0.90 10*3/mm3 Final   • Eosinophils, Absolute 03/09/2021 0.23  0.00 - 0.40 10*3/mm3 Final   • Basophils, Absolute 03/09/2021 0.03  0.00 - 0.20 10*3/mm3 Final   • Immature Grans, Absolute 03/09/2021 0.01  0.00 - 0.05 10*3/mm3 Final   • RBC, UA 03/09/2021 3-5* None Seen /HPF Final   • WBC, UA 03/09/2021 6-12* None Seen /HPF Final   • Bacteria, UA 03/09/2021 1+* None Seen /HPF Final   • Squamous Epithelial Cells, UA 03/09/2021 7-12* None Seen, 0-2 /HPF Final   • Hyaline Casts, UA 03/09/2021 None Seen  None Seen /LPF Final   • Methodology 03/09/2021 Manual Light Microscopy   Final     No results found.    Assessment:  Patient Active Problem List   Diagnosis   • Post-traumatic osteoarthritis, right shoulder   • Acute pain of right shoulder   • Atypical ductal hyperplasia of left breast       Plan: Patient wishes to proceed with reverse shoulder arthroplasty at this point in time.  I discussed with with the patient the specific indication of a reverse shoulder arthroplasty particularly for shoulder pain as well as for pseudoparalysis, and reviewed anatomy of the shoulder as well as a model of the implant.  I reviewed details of the procedure as well as risks, benefits, and alternatives with the risks including but not limited to neurovascular damage, bleeding, infection, periprosthetic fracture, chronic pain, instability, loosening of implant, failure of implant, loss of motion, weakness, stiffness, DVT, pulmonary embolus, death, stroke, complex regional pain syndrome, myocardial infarction, need for additional procedures.  Patient  understood all these had all questions answered today prior to consenting to proceed with surgery.  Patient has been medically optimized by primary care physician. No guarantees were given in regards to results of the surgery.      Britta Armijo was given the opportunity to ask and have all questions answered today.  The patient voiced understanding of the risks, benefits, and alternative forms of treatment that were discussed and the patient consents to proceed with surgery.     Patient's blood clot history is negative.    Plan for DVT prophylaxis is ASA    Patient's MRSA infection history is negative.    Patient's skin infection history is negative.    Discharge Plan: POD 1-2 to home    Date: 3/9/2021  Derrick Pelayo MD      Dictated utilizing Dragon dictation

## 2021-03-09 NOTE — H&P
History & Physical       Patient: Britta Armijo    YOB: 1945    Medical Record Number: [id number]    Surgeon:  Dr. Derrick Pelayo    Chief Complaints:   Chief Complaint   Patient presents with   • Right Shoulder - Pre-op Exam         History of Present Illness: 75 y.o. female presents with   Chief Complaint   Patient presents with   • Right Shoulder - Pre-op Exam   . Onset of symptoms was 2 years ago and has been progressively worsening despite more conservative treatment measures.  Symptoms are associated with ability to move, lift, push, pull, and perform activities of daily living with affected extremity. Symptoms are aggravated by overhead motion, lifting, and pushing as well as ROM necessary for activities of daily living.   Symptoms improve with rest, ice and elevation only minimally.      Allergies:   Allergies   Allergen Reactions   • Sulfa Antibiotics Nausea And Vomiting and Unknown - Low Severity       Medications:   Home Medications:  Current Outpatient Medications on File Prior to Visit   Medication Sig   • diclofenac (VOLTAREN) 1 % gel gel Apply 4 g topically to the appropriate area as directed 4 (Four) Times a Day.   • escitalopram (LEXAPRO) 20 MG tablet Take 20 mg by mouth Every Morning.   • famotidine (PEPCID) 40 MG tablet Take 40 mg by mouth Every Night.   • losartan-hydrochlorothiazide (HYZAAR) 100-12.5 MG per tablet Take 1 tablet by mouth Every Morning.   • nitroglycerin (Nitrostat) 0.4 MG SL tablet Place 0.4 mg under the tongue Every 5 (Five) Minutes As Needed.   • simvastatin (ZOCOR) 10 MG tablet Take 10 mg by mouth Every Night. TAKES EVERY OTHER NIGHT     No current facility-administered medications on file prior to visit.     Current Medications:  Scheduled Meds:  Continuous Infusions:No current facility-administered medications for this visit.    PRN Meds:.    I have reviewed the patient's medical history in detail and updated the computerized patient record.  Review and  summarization of old records include:    Past Medical History:   Diagnosis Date   • Arthritis of back     NECK & LOWER BACK   • Fracture, humerus    • Generalized anxiety disorder with panic attacks    • GERD (gastroesophageal reflux disease)    • Heart attack (CMS/HCC)     YEARS AGO   • Hyperlipidemia    • Hypertension    • Lumbosacral disc disease    • MRSA (methicillin resistant staph aureus) culture positive 2018    POSITIVE NASAL SWAB PRIOR TO HIP SURGERY   • Osteoporosis    • Right shoulder pain     SCHEDULED FOR TOTAL SHOULDER        Past Surgical History:   Procedure Laterality Date   • APPENDECTOMY     • BREAST LUMPECTOMY Left     BENIGN   • BREAST SURGERY  07/03/2020   • CATARACT EXTRACTION, BILATERAL     • CHOLECYSTECTOMY OPEN     • HEMORRHOIDECTOMY      X4   • HIP SURGERY Right     Replacement   • HYSTERECTOMY  1991   • TUMOR REMOVAL  2017    RIGHT ARM         Social History     Occupational History   • Not on file   Tobacco Use   • Smoking status: Never Smoker   • Smokeless tobacco: Never Used   Substance and Sexual Activity   • Alcohol use: Never   • Drug use: Never   • Sexual activity: Not on file      Social History     Social History Narrative   • Not on file        Family History   Problem Relation Age of Onset   • Cancer Father         spine   • Diabetes Paternal Aunt    • Diabetes Paternal Uncle        ROS: 14 point review of systems was performed and was negative except for documented findings in HPI and today's encounter.     Allergies:   Allergies   Allergen Reactions   • Sulfa Antibiotics Nausea And Vomiting and Unknown - Low Severity     Constitutional:  Denies fever, shaking or chills   Eyes:  Denies change in visual acuity   HENT:  Denies nasal congestion or sore throat   Respiratory:  Denies cough or shortness of breath   Cardiovascular:  Denies chest pain or severe LE edema   GI:  Denies abdominal pain, nausea, vomiting, bloody stools or diarrhea   Musculoskeletal:  Denies numbness  tingling or loss of motor function except as outlined above in history of present illness.  : Denies painful urination or hematuria  Integument:  Denies rash, lesion or ulceration   Neurologic:  Denies headache or focal weakness  Endocrine:  Denies lymphadenopathy  Psych:  Denies confusion or change in mental status   Hem:  Denies active bleeding    Physical Exam: 75 y.o. female  Body mass index is 28.36 kg/m².  There were no vitals filed for this visit.  Vital signs reviewed.   General Appearance:    Alert, cooperative, in no acute distress                  Eyes: conjunctiva clear  ENT: external ears and nose atraumatic  CV: no peripheral edema  Resp: normal respiratory effort  Skin: no rashes or wounds; normal turgor  Psych: mood and affect appropriate  Lymph: no nodes appreciated  Neuro: gross sensation intact  Vascular:  Palpable peripheral pulse in noted extremity  Musculoskeletal Extremities: right shoulder-  Tenderness  located anterior, posterior  FF-       Active-50              Passive-70              Strength- 3/5  ER-      Active-15              Passive 25              Strength- 3/5  IR        S1              Strength- 4/5  Positive deltoid firing all 3 components  Positive sensation light touch proximal lateral aspect right arm symmetric to the left  Bear hug sign-positive  Empty can test- positive     Drop arm test- positive  Ext rotation lag sign- positive     Neer's sign- positive  Anne- positive     Cross arm test- not done  Tenderness over AC joint- not done     Brisk cap refill to all digits, palpable radial pulse     Positive sensation to light touch palmar, dorsal aspects of small and index fingers and anatomic snuffbox right hand    Debilities/Disabilities Identified: None      Diagnostic Tests:  Appointment on 03/09/2021   Component Date Value Ref Range Status   • QT Interval 03/09/2021 414  ms Preliminary   • Color, UA 03/09/2021 Straw  Yellow, Straw Final   • Appearance,  03/09/2021  Clear  Clear Final   • pH, UA 03/09/2021 7.0  4.5 - 8.0 Final   • Specific Gravity, UA 03/09/2021 1.020  1.003 - 1.030 Final   • Glucose, UA 03/09/2021 Negative  Negative Final   • Ketones, UA 03/09/2021 Negative  Negative Final   • Bilirubin, UA 03/09/2021 Negative  Negative Final   • Blood, UA 03/09/2021 Trace* Negative Final   • Protein, UA 03/09/2021 Negative  Negative Final   • Leuk Esterase, UA 03/09/2021 Large (3+)* Negative Final   • Nitrite, UA 03/09/2021 Negative  Negative Final   • Urobilinogen, UA 03/09/2021 0.2 E.U./dL  0.2 - 1.0 E.U./dL Final   • PTT 03/09/2021 36.0  24.3 - 38.1 seconds Final   • Protime 03/09/2021 13.4  12.1 - 15.0 Seconds Final   • INR 03/09/2021 1.05  0.90 - 1.10 Final   • Glucose 03/09/2021 138* 65 - 99 mg/dL Final   • BUN 03/09/2021 16  8 - 23 mg/dL Final   • Creatinine 03/09/2021 1.10* 0.57 - 1.00 mg/dL Final   • Sodium 03/09/2021 132* 136 - 145 mmol/L Final   • Potassium 03/09/2021 4.0  3.5 - 5.2 mmol/L Final   • Chloride 03/09/2021 95* 98 - 107 mmol/L Final   • CO2 03/09/2021 28.0  22.0 - 29.0 mmol/L Final   • Calcium 03/09/2021 9.4  8.6 - 10.5 mg/dL Final   • eGFR Non African Amer 03/09/2021 49* >60 mL/min/1.73 Final   • BUN/Creatinine Ratio 03/09/2021 14.5  7.0 - 25.0 Final   • Anion Gap 03/09/2021 9.0  5.0 - 15.0 mmol/L Final   • WBC 03/09/2021 8.29  3.40 - 10.80 10*3/mm3 Final   • RBC 03/09/2021 4.11  3.77 - 5.28 10*6/mm3 Final   • Hemoglobin 03/09/2021 12.2  12.0 - 15.9 g/dL Final   • Hematocrit 03/09/2021 39.4  34.0 - 46.6 % Final   • MCV 03/09/2021 95.9  79.0 - 97.0 fL Final   • MCH 03/09/2021 29.7  26.6 - 33.0 pg Final   • MCHC 03/09/2021 31.0* 31.5 - 35.7 g/dL Final   • RDW 03/09/2021 12.4  12.3 - 15.4 % Final   • RDW-SD 03/09/2021 43.9  37.0 - 54.0 fl Final   • MPV 03/09/2021 9.1  6.0 - 12.0 fL Final   • Platelets 03/09/2021 325  140 - 450 10*3/mm3 Final   • Neutrophil % 03/09/2021 64.6  42.7 - 76.0 % Final   • Lymphocyte % 03/09/2021 26.3  19.6 - 45.3 % Final   •  Monocyte % 03/09/2021 5.8  5.0 - 12.0 % Final   • Eosinophil % 03/09/2021 2.8  0.3 - 6.2 % Final   • Basophil % 03/09/2021 0.4  0.0 - 1.5 % Final   • Immature Grans % 03/09/2021 0.1  0.0 - 0.5 % Final   • Neutrophils, Absolute 03/09/2021 5.36  1.70 - 7.00 10*3/mm3 Final   • Lymphocytes, Absolute 03/09/2021 2.18  0.70 - 3.10 10*3/mm3 Final   • Monocytes, Absolute 03/09/2021 0.48  0.10 - 0.90 10*3/mm3 Final   • Eosinophils, Absolute 03/09/2021 0.23  0.00 - 0.40 10*3/mm3 Final   • Basophils, Absolute 03/09/2021 0.03  0.00 - 0.20 10*3/mm3 Final   • Immature Grans, Absolute 03/09/2021 0.01  0.00 - 0.05 10*3/mm3 Final   • RBC, UA 03/09/2021 3-5* None Seen /HPF Final   • WBC, UA 03/09/2021 6-12* None Seen /HPF Final   • Bacteria, UA 03/09/2021 1+* None Seen /HPF Final   • Squamous Epithelial Cells, UA 03/09/2021 7-12* None Seen, 0-2 /HPF Final   • Hyaline Casts, UA 03/09/2021 None Seen  None Seen /LPF Final   • Methodology 03/09/2021 Manual Light Microscopy   Final     No results found.    Assessment:  Patient Active Problem List   Diagnosis   • Post-traumatic osteoarthritis, right shoulder   • Acute pain of right shoulder   • Atypical ductal hyperplasia of left breast       Plan: Patient wishes to proceed with reverse shoulder arthroplasty at this point in time.  I discussed with with the patient the specific indication of a reverse shoulder arthroplasty particularly for shoulder pain as well as for pseudoparalysis, and reviewed anatomy of the shoulder as well as a model of the implant.  I reviewed details of the procedure as well as risks, benefits, and alternatives with the risks including but not limited to neurovascular damage, bleeding, infection, periprosthetic fracture, chronic pain, instability, loosening of implant, failure of implant, loss of motion, weakness, stiffness, DVT, pulmonary embolus, death, stroke, complex regional pain syndrome, myocardial infarction, need for additional procedures.  Patient  understood all these had all questions answered today prior to consenting to proceed with surgery.  Patient has been medically optimized by primary care physician. No guarantees were given in regards to results of the surgery.      Britta Armijo was given the opportunity to ask and have all questions answered today.  The patient voiced understanding of the risks, benefits, and alternative forms of treatment that were discussed and the patient consents to proceed with surgery.     Patient's blood clot history is negative.    Plan for DVT prophylaxis is ASA    Patient's MRSA infection history is negative.    Patient's skin infection history is negative.    Discharge Plan: POD 1-2 to home    Date: 3/9/2021  Derrick Pelayo MD      Dictated utilizing Dragon dictation

## 2021-03-09 NOTE — PROGRESS NOTES
CC: f/u right shoulder pain, DJD     Britta Armijo presented to clinic today for preoperative history and physical visit in anticipation of upcoming scheduled right reverse total shoulder arthroplasty.     Discussed treatment options at length with patient today in regards to right shoulder. She has failed conservative treatment at this point in time and would like to proceed with shoulder arthroplasty. I discussed with the patient the specific indication of a reverse shoulder arthroplasty particularly for shoulder pain as well as for pseudoparalysis, and reviewed anatomy of the shoulder as well as a model of the implant. I reviewed details of the procedure as well as risks, benefits, and alternatives with the risks including but not limited to neurovascular damage, bleeding, infection, periprosthetic fracture, chronic pain, instability, loosening of implant, failure of implant, loss of motion, weakness, stiffness, DVT, pulmonary embolus, death, stroke, complex regional pain syndrome, myocardial infarction, need for additional procedures. Patient understood all these and had all questions answered today. No guarantees given in regards to results from the surgery.     Patient has been medically optimized.     Postoperative DVT prophylaxis will be with aspirin.  Patient does not have a history of DVT or pulmonary embolus.     All remaining questions answered today.

## 2021-03-10 LAB
BACTERIA SPEC AEROBE CULT: NORMAL
MRSA SPEC QL CULT: NORMAL

## 2021-03-16 ENCOUNTER — TELEPHONE (OUTPATIENT)
Dept: ORTHOPEDIC SURGERY | Facility: CLINIC | Age: 76
End: 2021-03-16

## 2021-03-16 ENCOUNTER — PREP FOR SURGERY (OUTPATIENT)
Dept: OTHER | Facility: HOSPITAL | Age: 76
End: 2021-03-16

## 2021-03-16 DIAGNOSIS — M19.111 POST-TRAUMATIC OSTEOARTHRITIS, RIGHT SHOULDER: Primary | ICD-10-CM

## 2021-03-16 NOTE — TELEPHONE ENCOUNTER
I would likely still the surgery she had a UTI at time of the procedure-I just add a different antibiotic to cover the infection

## 2021-03-16 NOTE — TELEPHONE ENCOUNTER
Dr. Pendleton called stating pt has a UTI and pt was not informed until yesterday when he saw her for her medical clearance. He has placed her on antibiotics and will send her back to the hospital of Friday for a repeat urinalysis.  He wants you to be informed because if it is not cleared up, you may need to reschedule her surgery.

## 2021-03-19 ENCOUNTER — ANESTHESIA EVENT (OUTPATIENT)
Dept: PERIOP | Facility: HOSPITAL | Age: 76
End: 2021-03-19

## 2021-03-19 ENCOUNTER — TELEPHONE (OUTPATIENT)
Dept: ORTHOPEDIC SURGERY | Facility: CLINIC | Age: 76
End: 2021-03-19

## 2021-03-19 ENCOUNTER — LAB (OUTPATIENT)
Dept: LAB | Facility: HOSPITAL | Age: 76
End: 2021-03-19

## 2021-03-19 DIAGNOSIS — U07.1 COVID-19: ICD-10-CM

## 2021-03-19 LAB — SARS-COV-2 RNA PNL SPEC NAA+PROBE: NOT DETECTED

## 2021-03-19 PROCEDURE — 87635 SARS-COV-2 COVID-19 AMP PRB: CPT | Performed by: OBSTETRICS & GYNECOLOGY

## 2021-03-19 PROCEDURE — C9803 HOPD COVID-19 SPEC COLLECT: HCPCS

## 2021-03-19 NOTE — TELEPHONE ENCOUNTER
Provider: DR. GARCIA  Caller: MARISEL ARIAS  Relationship to Patient: COUSIN/CAREGIVER    Phone Number: [phone]  Reason for Call: SURGERY 3-22-21  When was the patient last seen: 3-9-21    MARISEL ARIAS, PATIENT'S COUSIN/CAREGIVER WOULD LIKE A CALL BACK TO DISCUSS DAY OF SURGERY 3-22-21.  SHE WOULD LIKE TO CONFIRM IF SHE WILL BE CALLED ONCE PATIENT IS OUT OF SURGERY &  DISCUSS HOW IT WENT ALONG WITH POST SURGERY INSTRUCTIONS.

## 2021-03-21 ENCOUNTER — TRANSCRIBE ORDERS (OUTPATIENT)
Dept: ADMINISTRATIVE | Facility: HOSPITAL | Age: 76
End: 2021-03-21

## 2021-03-21 ENCOUNTER — LAB (OUTPATIENT)
Dept: LAB | Facility: HOSPITAL | Age: 76
End: 2021-03-21

## 2021-03-21 DIAGNOSIS — N39.0 URINARY TRACT INFECTION WITHOUT HEMATURIA, SITE UNSPECIFIED: Primary | ICD-10-CM

## 2021-03-21 DIAGNOSIS — N39.0 URINARY TRACT INFECTION WITHOUT HEMATURIA, SITE UNSPECIFIED: ICD-10-CM

## 2021-03-21 LAB
BACTERIA UR QL AUTO: ABNORMAL /HPF
BILIRUB UR QL STRIP: NEGATIVE
CLARITY UR: ABNORMAL
COLOR UR: YELLOW
GLUCOSE UR STRIP-MCNC: NEGATIVE MG/DL
HGB UR QL STRIP.AUTO: ABNORMAL
HYALINE CASTS UR QL AUTO: ABNORMAL /LPF
KETONES UR QL STRIP: NEGATIVE
LEUKOCYTE ESTERASE UR QL STRIP.AUTO: ABNORMAL
NITRITE UR QL STRIP: NEGATIVE
PH UR STRIP.AUTO: 7 [PH] (ref 4.5–8)
PROT UR QL STRIP: ABNORMAL
RBC # UR: ABNORMAL /HPF
REF LAB TEST METHOD: ABNORMAL
SP GR UR STRIP: 1.02 (ref 1–1.03)
SQUAMOUS #/AREA URNS HPF: ABNORMAL /HPF
UROBILINOGEN UR QL STRIP: ABNORMAL
WBC UR QL AUTO: ABNORMAL /HPF

## 2021-03-21 PROCEDURE — 81001 URINALYSIS AUTO W/SCOPE: CPT

## 2021-03-21 PROCEDURE — 87086 URINE CULTURE/COLONY COUNT: CPT

## 2021-03-21 PROCEDURE — 87186 SC STD MICRODIL/AGAR DIL: CPT

## 2021-03-21 PROCEDURE — 87077 CULTURE AEROBIC IDENTIFY: CPT

## 2021-03-22 ENCOUNTER — ANESTHESIA (OUTPATIENT)
Dept: PERIOP | Facility: HOSPITAL | Age: 76
End: 2021-03-22

## 2021-03-22 ENCOUNTER — APPOINTMENT (OUTPATIENT)
Dept: GENERAL RADIOLOGY | Facility: HOSPITAL | Age: 76
End: 2021-03-22

## 2021-03-22 ENCOUNTER — HOSPITAL ENCOUNTER (INPATIENT)
Facility: HOSPITAL | Age: 76
LOS: 1 days | Discharge: HOME HEALTH | End: 2021-03-23
Attending: ORTHOPAEDIC SURGERY | Admitting: ORTHOPAEDIC SURGERY

## 2021-03-22 DIAGNOSIS — Z96.611 STATUS POST REVERSE TOTAL SHOULDER REPLACEMENT, RIGHT: Primary | ICD-10-CM

## 2021-03-22 DIAGNOSIS — M19.111 POST-TRAUMATIC OSTEOARTHRITIS, RIGHT SHOULDER: ICD-10-CM

## 2021-03-22 LAB
HCT VFR BLD AUTO: 37.3 % (ref 34–46.6)
HGB BLD-MCNC: 11.2 G/DL (ref 12–15.9)

## 2021-03-22 PROCEDURE — C1713 ANCHOR/SCREW BN/BN,TIS/BN: HCPCS | Performed by: ORTHOPAEDIC SURGERY

## 2021-03-22 PROCEDURE — C1889 IMPLANT/INSERT DEVICE, NOC: HCPCS | Performed by: ORTHOPAEDIC SURGERY

## 2021-03-22 PROCEDURE — 23472 RECONSTRUCT SHOULDER JOINT: CPT | Performed by: ORTHOPAEDIC SURGERY

## 2021-03-22 PROCEDURE — 25010000002 PROPOFOL 10 MG/ML EMULSION: Performed by: NURSE ANESTHETIST, CERTIFIED REGISTERED

## 2021-03-22 PROCEDURE — C9290 INJ, BUPIVACAINE LIPOSOME: HCPCS | Performed by: ORTHOPAEDIC SURGERY

## 2021-03-22 PROCEDURE — 25010000002 VANCOMYCIN 750 MG RECONSTITUTED SOLUTION 1 EACH VIAL: Performed by: ORTHOPAEDIC SURGERY

## 2021-03-22 PROCEDURE — 25010000002 DEXAMETHASONE PER 1 MG: Performed by: REGISTERED NURSE

## 2021-03-22 PROCEDURE — 85014 HEMATOCRIT: CPT | Performed by: ORTHOPAEDIC SURGERY

## 2021-03-22 PROCEDURE — 73020 X-RAY EXAM OF SHOULDER: CPT

## 2021-03-22 PROCEDURE — 25010000002 NEOSTIGMINE 10 MG/10ML SOLUTION: Performed by: NURSE ANESTHETIST, CERTIFIED REGISTERED

## 2021-03-22 PROCEDURE — 25010000002 PHENYLEPHRINE PER 1 ML: Performed by: NURSE ANESTHETIST, CERTIFIED REGISTERED

## 2021-03-22 PROCEDURE — 94799 UNLISTED PULMONARY SVC/PX: CPT

## 2021-03-22 PROCEDURE — 76942 ECHO GUIDE FOR BIOPSY: CPT | Performed by: ORTHOPAEDIC SURGERY

## 2021-03-22 PROCEDURE — 25010000002 MIDAZOLAM PER 1MG: Performed by: REGISTERED NURSE

## 2021-03-22 PROCEDURE — 23472 RECONSTRUCT SHOULDER JOINT: CPT | Performed by: SPECIALIST/TECHNOLOGIST, OTHER

## 2021-03-22 PROCEDURE — 25010000003 BUPIVACAINE LIPOSOME 1.3 % SUSPENSION: Performed by: ORTHOPAEDIC SURGERY

## 2021-03-22 PROCEDURE — 85018 HEMOGLOBIN: CPT | Performed by: ORTHOPAEDIC SURGERY

## 2021-03-22 PROCEDURE — 0RRJ00Z REPLACEMENT OF RIGHT SHOULDER JOINT WITH REVERSE BALL AND SOCKET SYNTHETIC SUBSTITUTE, OPEN APPROACH: ICD-10-PCS | Performed by: ORTHOPAEDIC SURGERY

## 2021-03-22 PROCEDURE — 25010000002 VANCOMYCIN PER 500 MG: Performed by: ORTHOPAEDIC SURGERY

## 2021-03-22 PROCEDURE — 25010000002 ONDANSETRON PER 1 MG: Performed by: REGISTERED NURSE

## 2021-03-22 PROCEDURE — C1776 JOINT DEVICE (IMPLANTABLE): HCPCS | Performed by: ORTHOPAEDIC SURGERY

## 2021-03-22 PROCEDURE — 25010000002 VANCOMYCIN 1 G RECONSTITUTED SOLUTION: Performed by: ORTHOPAEDIC SURGERY

## 2021-03-22 DEVICE — IMPLANTABLE DEVICE: Type: IMPLANTABLE DEVICE | Site: SHOULDER | Status: FUNCTIONAL

## 2021-03-22 DEVICE — LINER HUM/SHLDR TRABECULARMETAL REV POLY 60DEG 36MM PLS0: Type: IMPLANTABLE DEVICE | Site: SHOULDER | Status: FUNCTIONAL

## 2021-03-22 DEVICE — DEV CONTRL TISS STRATAFIX SPIRAL MNCRYL UD 3/0 PLS 45CM: Type: IMPLANTABLE DEVICE | Site: SHOULDER | Status: FUNCTIONAL

## 2021-03-22 DEVICE — SMARTSET GMV HIGH PERFORMANCE GENTAMICIN MEDIUM VISCOSITY BONE CEMENT 40G
Type: IMPLANTABLE DEVICE | Site: SHOULDER | Status: FUNCTIONAL
Brand: SMARTSET

## 2021-03-22 DEVICE — SCRW FIX LK HEX 4.75X25MM: Type: IMPLANTABLE DEVICE | Site: SHOULDER | Status: FUNCTIONAL

## 2021-03-22 DEVICE — SCRW FIX LK HEX 4.75X15MM: Type: IMPLANTABLE DEVICE | Site: SHOULDER | Status: FUNCTIONAL

## 2021-03-22 DEVICE — GLENOSPHERE VERSA DIAL FXD OFFST36 PLS3: Type: IMPLANTABLE DEVICE | Site: SHOULDER | Status: FUNCTIONAL

## 2021-03-22 DEVICE — SCRW FIX LK HEX 4.75X20MM: Type: IMPLANTABLE DEVICE | Site: SHOULDER | Status: FUNCTIONAL

## 2021-03-22 DEVICE — SUT FW #2 W/TPR NDL 1/2 CIR 38IN 97CM 26.5MM BLU: Type: IMPLANTABLE DEVICE | Site: SHOULDER | Status: FUNCTIONAL

## 2021-03-22 DEVICE — TOTL SHLDER REV: Type: IMPLANTABLE DEVICE | Site: SHOULDER | Status: FUNCTIONAL

## 2021-03-22 DEVICE — BASEPLT GLEN COMPR MINI W TPR ADAPTR 25: Type: IMPLANTABLE DEVICE | Site: SHOULDER | Status: FUNCTIONAL

## 2021-03-22 DEVICE — SCRW COMPRNSV CNTRL 6.5X25MM REUS: Type: IMPLANTABLE DEVICE | Site: SHOULDER | Status: FUNCTIONAL

## 2021-03-22 RX ORDER — SODIUM CHLORIDE 0.9 % (FLUSH) 0.9 %
10 SYRINGE (ML) INJECTION AS NEEDED
Status: DISCONTINUED | OUTPATIENT
Start: 2021-03-22 | End: 2021-03-22 | Stop reason: HOSPADM

## 2021-03-22 RX ORDER — SODIUM CHLORIDE 0.9 % (FLUSH) 0.9 %
10 SYRINGE (ML) INJECTION EVERY 12 HOURS SCHEDULED
Status: DISCONTINUED | OUTPATIENT
Start: 2021-03-22 | End: 2021-03-22 | Stop reason: HOSPADM

## 2021-03-22 RX ORDER — ALBUTEROL SULFATE 90 UG/1
2 AEROSOL, METERED RESPIRATORY (INHALATION) EVERY 4 HOURS PRN
Status: DISCONTINUED | OUTPATIENT
Start: 2021-03-22 | End: 2021-03-23 | Stop reason: HOSPADM

## 2021-03-22 RX ORDER — ESCITALOPRAM OXALATE 10 MG/1
20 TABLET ORAL EVERY MORNING
Status: DISCONTINUED | OUTPATIENT
Start: 2021-03-23 | End: 2021-03-23 | Stop reason: HOSPADM

## 2021-03-22 RX ORDER — VANCOMYCIN HYDROCHLORIDE 1 G/20ML
INJECTION, POWDER, LYOPHILIZED, FOR SOLUTION INTRAVENOUS AS NEEDED
Status: DISCONTINUED | OUTPATIENT
Start: 2021-03-22 | End: 2021-03-22 | Stop reason: HOSPADM

## 2021-03-22 RX ORDER — DEXAMETHASONE SODIUM PHOSPHATE 4 MG/ML
8 INJECTION, SOLUTION INTRA-ARTICULAR; INTRALESIONAL; INTRAMUSCULAR; INTRAVENOUS; SOFT TISSUE ONCE AS NEEDED
Status: COMPLETED | OUTPATIENT
Start: 2021-03-22 | End: 2021-03-22

## 2021-03-22 RX ORDER — MELOXICAM 7.5 MG/1
15 TABLET ORAL ONCE
Status: COMPLETED | OUTPATIENT
Start: 2021-03-22 | End: 2021-03-22

## 2021-03-22 RX ORDER — MIDAZOLAM HYDROCHLORIDE 2 MG/2ML
2 INJECTION, SOLUTION INTRAMUSCULAR; INTRAVENOUS ONCE
Status: COMPLETED | OUTPATIENT
Start: 2021-03-22 | End: 2021-03-22

## 2021-03-22 RX ORDER — BUPIVACAINE HYDROCHLORIDE AND EPINEPHRINE 5; 5 MG/ML; UG/ML
INJECTION, SOLUTION EPIDURAL; INTRACAUDAL; PERINEURAL AS NEEDED
Status: DISCONTINUED | OUTPATIENT
Start: 2021-03-22 | End: 2021-03-22

## 2021-03-22 RX ORDER — OXYCODONE HYDROCHLORIDE 5 MG/1
10 TABLET ORAL EVERY 4 HOURS PRN
Status: DISCONTINUED | OUTPATIENT
Start: 2021-03-22 | End: 2021-03-23 | Stop reason: HOSPADM

## 2021-03-22 RX ORDER — SODIUM CHLORIDE 9 MG/ML
100 INJECTION, SOLUTION INTRAVENOUS CONTINUOUS
Status: DISCONTINUED | OUTPATIENT
Start: 2021-03-22 | End: 2021-03-23 | Stop reason: HOSPADM

## 2021-03-22 RX ORDER — ONDANSETRON 4 MG/1
4 TABLET, FILM COATED ORAL EVERY 6 HOURS PRN
Status: DISCONTINUED | OUTPATIENT
Start: 2021-03-22 | End: 2021-03-23 | Stop reason: HOSPADM

## 2021-03-22 RX ORDER — NALOXONE HCL 0.4 MG/ML
0.4 VIAL (ML) INJECTION
Status: DISCONTINUED | OUTPATIENT
Start: 2021-03-22 | End: 2021-03-23 | Stop reason: HOSPADM

## 2021-03-22 RX ORDER — OXYCODONE HYDROCHLORIDE 5 MG/1
5 TABLET ORAL EVERY 4 HOURS PRN
Status: DISCONTINUED | OUTPATIENT
Start: 2021-03-22 | End: 2021-03-23 | Stop reason: HOSPADM

## 2021-03-22 RX ORDER — ROCURONIUM BROMIDE 10 MG/ML
INJECTION, SOLUTION INTRAVENOUS AS NEEDED
Status: DISCONTINUED | OUTPATIENT
Start: 2021-03-22 | End: 2021-03-22 | Stop reason: SURG

## 2021-03-22 RX ORDER — SODIUM CHLORIDE 9 MG/ML
INJECTION, SOLUTION INTRAVENOUS AS NEEDED
Status: DISCONTINUED | OUTPATIENT
Start: 2021-03-22 | End: 2021-03-22 | Stop reason: HOSPADM

## 2021-03-22 RX ORDER — NITROGLYCERIN 0.4 MG/1
0.4 TABLET SUBLINGUAL
Status: DISCONTINUED | OUTPATIENT
Start: 2021-03-22 | End: 2021-03-23 | Stop reason: HOSPADM

## 2021-03-22 RX ORDER — ATORVASTATIN CALCIUM 10 MG/1
10 TABLET, FILM COATED ORAL
Status: DISCONTINUED | OUTPATIENT
Start: 2021-03-24 | End: 2021-03-23 | Stop reason: HOSPADM

## 2021-03-22 RX ORDER — ACETAMINOPHEN 325 MG/1
975 TABLET ORAL 4 TIMES DAILY
Status: DISCONTINUED | OUTPATIENT
Start: 2021-03-22 | End: 2021-03-23 | Stop reason: HOSPADM

## 2021-03-22 RX ORDER — ASPIRIN 81 MG/1
81 TABLET ORAL DAILY
Status: DISCONTINUED | OUTPATIENT
Start: 2021-03-23 | End: 2021-03-23 | Stop reason: HOSPADM

## 2021-03-22 RX ORDER — GLYCOPYRROLATE 0.2 MG/ML
INJECTION INTRAMUSCULAR; INTRAVENOUS AS NEEDED
Status: DISCONTINUED | OUTPATIENT
Start: 2021-03-22 | End: 2021-03-22 | Stop reason: SURG

## 2021-03-22 RX ORDER — ONDANSETRON 2 MG/ML
4 INJECTION INTRAMUSCULAR; INTRAVENOUS ONCE AS NEEDED
Status: DISCONTINUED | OUTPATIENT
Start: 2021-03-22 | End: 2021-03-22 | Stop reason: HOSPADM

## 2021-03-22 RX ORDER — SODIUM CHLORIDE, SODIUM LACTATE, POTASSIUM CHLORIDE, CALCIUM CHLORIDE 600; 310; 30; 20 MG/100ML; MG/100ML; MG/100ML; MG/100ML
100 INJECTION, SOLUTION INTRAVENOUS CONTINUOUS
Status: DISCONTINUED | OUTPATIENT
Start: 2021-03-22 | End: 2021-03-23 | Stop reason: HOSPADM

## 2021-03-22 RX ORDER — ACETAMINOPHEN 500 MG
1000 TABLET ORAL ONCE
Status: COMPLETED | OUTPATIENT
Start: 2021-03-22 | End: 2021-03-22

## 2021-03-22 RX ORDER — LIDOCAINE HYDROCHLORIDE 10 MG/ML
0.5 INJECTION, SOLUTION EPIDURAL; INFILTRATION; INTRACAUDAL; PERINEURAL ONCE AS NEEDED
Status: DISCONTINUED | OUTPATIENT
Start: 2021-03-22 | End: 2021-03-22 | Stop reason: HOSPADM

## 2021-03-22 RX ORDER — FAMOTIDINE 10 MG/ML
20 INJECTION, SOLUTION INTRAVENOUS
Status: COMPLETED | OUTPATIENT
Start: 2021-03-22 | End: 2021-03-22

## 2021-03-22 RX ORDER — SODIUM CHLORIDE, SODIUM LACTATE, POTASSIUM CHLORIDE, CALCIUM CHLORIDE 600; 310; 30; 20 MG/100ML; MG/100ML; MG/100ML; MG/100ML
9 INJECTION, SOLUTION INTRAVENOUS CONTINUOUS
Status: DISCONTINUED | OUTPATIENT
Start: 2021-03-22 | End: 2021-03-22 | Stop reason: HOSPADM

## 2021-03-22 RX ORDER — FENTANYL CITRATE 50 UG/ML
25 INJECTION, SOLUTION INTRAMUSCULAR; INTRAVENOUS
Status: DISCONTINUED | OUTPATIENT
Start: 2021-03-22 | End: 2021-03-22 | Stop reason: HOSPADM

## 2021-03-22 RX ORDER — PROPOFOL 10 MG/ML
VIAL (ML) INTRAVENOUS AS NEEDED
Status: DISCONTINUED | OUTPATIENT
Start: 2021-03-22 | End: 2021-03-22 | Stop reason: SURG

## 2021-03-22 RX ORDER — MORPHINE SULFATE 10 MG/ML
6 INJECTION INTRAMUSCULAR; INTRAVENOUS; SUBCUTANEOUS
Status: DISCONTINUED | OUTPATIENT
Start: 2021-03-22 | End: 2021-03-23 | Stop reason: HOSPADM

## 2021-03-22 RX ORDER — FAMOTIDINE 20 MG/1
40 TABLET, FILM COATED ORAL NIGHTLY
Status: DISCONTINUED | OUTPATIENT
Start: 2021-03-22 | End: 2021-03-23 | Stop reason: HOSPADM

## 2021-03-22 RX ORDER — CHOLECALCIFEROL (VITAMIN D3) 125 MCG
5 CAPSULE ORAL NIGHTLY PRN
Status: DISCONTINUED | OUTPATIENT
Start: 2021-03-22 | End: 2021-03-23 | Stop reason: HOSPADM

## 2021-03-22 RX ORDER — BUPIVACAINE HYDROCHLORIDE AND EPINEPHRINE 5; 5 MG/ML; UG/ML
INJECTION, SOLUTION EPIDURAL; INTRACAUDAL; PERINEURAL
Status: COMPLETED | OUTPATIENT
Start: 2021-03-22 | End: 2021-03-22

## 2021-03-22 RX ORDER — NEOSTIGMINE METHYLSULFATE 1 MG/ML
INJECTION, SOLUTION INTRAVENOUS AS NEEDED
Status: DISCONTINUED | OUTPATIENT
Start: 2021-03-22 | End: 2021-03-22 | Stop reason: SURG

## 2021-03-22 RX ORDER — LIDOCAINE HYDROCHLORIDE 20 MG/ML
INJECTION, SOLUTION INFILTRATION; PERINEURAL AS NEEDED
Status: DISCONTINUED | OUTPATIENT
Start: 2021-03-22 | End: 2021-03-22 | Stop reason: SURG

## 2021-03-22 RX ORDER — ONDANSETRON 2 MG/ML
4 INJECTION INTRAMUSCULAR; INTRAVENOUS EVERY 6 HOURS PRN
Status: DISCONTINUED | OUTPATIENT
Start: 2021-03-22 | End: 2021-03-23 | Stop reason: HOSPADM

## 2021-03-22 RX ORDER — MAGNESIUM HYDROXIDE 1200 MG/15ML
LIQUID ORAL AS NEEDED
Status: DISCONTINUED | OUTPATIENT
Start: 2021-03-22 | End: 2021-03-22 | Stop reason: HOSPADM

## 2021-03-22 RX ORDER — PREGABALIN 75 MG/1
150 CAPSULE ORAL ONCE
Status: COMPLETED | OUTPATIENT
Start: 2021-03-22 | End: 2021-03-22

## 2021-03-22 RX ORDER — SODIUM CHLORIDE 9 MG/ML
40 INJECTION, SOLUTION INTRAVENOUS AS NEEDED
Status: DISCONTINUED | OUTPATIENT
Start: 2021-03-22 | End: 2021-03-22 | Stop reason: HOSPADM

## 2021-03-22 RX ORDER — ONDANSETRON 2 MG/ML
4 INJECTION INTRAMUSCULAR; INTRAVENOUS ONCE AS NEEDED
Status: COMPLETED | OUTPATIENT
Start: 2021-03-22 | End: 2021-03-22

## 2021-03-22 RX ORDER — SODIUM CHLORIDE, SODIUM LACTATE, POTASSIUM CHLORIDE, CALCIUM CHLORIDE 600; 310; 30; 20 MG/100ML; MG/100ML; MG/100ML; MG/100ML
9 INJECTION, SOLUTION INTRAVENOUS CONTINUOUS PRN
Status: DISCONTINUED | OUTPATIENT
Start: 2021-03-22 | End: 2021-03-22 | Stop reason: HOSPADM

## 2021-03-22 RX ORDER — LIDOCAINE HYDROCHLORIDE 10 MG/ML
0.5 INJECTION, SOLUTION EPIDURAL; INFILTRATION; INTRACAUDAL; PERINEURAL ONCE AS NEEDED
Status: COMPLETED | OUTPATIENT
Start: 2021-03-22 | End: 2021-03-22

## 2021-03-22 RX ORDER — MELOXICAM 7.5 MG/1
15 TABLET ORAL DAILY
Status: DISCONTINUED | OUTPATIENT
Start: 2021-03-22 | End: 2021-03-23 | Stop reason: HOSPADM

## 2021-03-22 RX ADMIN — PHENYLEPHRINE HYDROCHLORIDE 100 MCG: 10 INJECTION, SOLUTION INTRAMUSCULAR; INTRAVENOUS; SUBCUTANEOUS at 12:30

## 2021-03-22 RX ADMIN — PHENYLEPHRINE HYDROCHLORIDE 100 MCG: 10 INJECTION, SOLUTION INTRAMUSCULAR; INTRAVENOUS; SUBCUTANEOUS at 12:13

## 2021-03-22 RX ADMIN — PHENYLEPHRINE HYDROCHLORIDE 50 MCG: 10 INJECTION, SOLUTION INTRAMUSCULAR; INTRAVENOUS; SUBCUTANEOUS at 12:54

## 2021-03-22 RX ADMIN — PHENYLEPHRINE HYDROCHLORIDE 100 MCG: 10 INJECTION, SOLUTION INTRAMUSCULAR; INTRAVENOUS; SUBCUTANEOUS at 13:17

## 2021-03-22 RX ADMIN — ACETAMINOPHEN 975 MG: 325 TABLET ORAL at 21:04

## 2021-03-22 RX ADMIN — SODIUM CHLORIDE 1000 MG: 900 INJECTION, SOLUTION INTRAVENOUS at 10:40

## 2021-03-22 RX ADMIN — Medication 5 MG: at 21:03

## 2021-03-22 RX ADMIN — PROPOFOL 150 MG: 10 INJECTION, EMULSION INTRAVENOUS at 11:38

## 2021-03-22 RX ADMIN — SODIUM CHLORIDE 1000 MG: 9 INJECTION, SOLUTION INTRAVENOUS at 14:06

## 2021-03-22 RX ADMIN — PHENYLEPHRINE HYDROCHLORIDE 100 MCG: 10 INJECTION, SOLUTION INTRAMUSCULAR; INTRAVENOUS; SUBCUTANEOUS at 13:59

## 2021-03-22 RX ADMIN — PHENYLEPHRINE HYDROCHLORIDE 100 MCG: 10 INJECTION, SOLUTION INTRAMUSCULAR; INTRAVENOUS; SUBCUTANEOUS at 12:23

## 2021-03-22 RX ADMIN — SODIUM CHLORIDE 1000 MG: 9 INJECTION, SOLUTION INTRAVENOUS at 11:50

## 2021-03-22 RX ADMIN — PREGABALIN 150 MG: 75 CAPSULE ORAL at 10:42

## 2021-03-22 RX ADMIN — OXYCODONE HYDROCHLORIDE 10 MG: 5 TABLET ORAL at 19:07

## 2021-03-22 RX ADMIN — FAMOTIDINE 20 MG: 10 INJECTION, SOLUTION INTRAVENOUS at 11:02

## 2021-03-22 RX ADMIN — NEOSTIGMINE METHYLSULFATE 3 MG: 1 INJECTION INTRAVENOUS at 14:28

## 2021-03-22 RX ADMIN — PHENYLEPHRINE HYDROCHLORIDE 50 MCG: 10 INJECTION, SOLUTION INTRAMUSCULAR; INTRAVENOUS; SUBCUTANEOUS at 13:05

## 2021-03-22 RX ADMIN — PHENYLEPHRINE HYDROCHLORIDE 100 MCG: 10 INJECTION, SOLUTION INTRAMUSCULAR; INTRAVENOUS; SUBCUTANEOUS at 13:47

## 2021-03-22 RX ADMIN — ACETAMINOPHEN 975 MG: 325 TABLET ORAL at 17:07

## 2021-03-22 RX ADMIN — PHENYLEPHRINE HYDROCHLORIDE 100 MCG: 10 INJECTION, SOLUTION INTRAMUSCULAR; INTRAVENOUS; SUBCUTANEOUS at 14:17

## 2021-03-22 RX ADMIN — SODIUM CHLORIDE, POTASSIUM CHLORIDE, SODIUM LACTATE AND CALCIUM CHLORIDE 9 ML/HR: 600; 310; 30; 20 INJECTION, SOLUTION INTRAVENOUS at 10:30

## 2021-03-22 RX ADMIN — PHENYLEPHRINE HYDROCHLORIDE 100 MCG: 10 INJECTION, SOLUTION INTRAMUSCULAR; INTRAVENOUS; SUBCUTANEOUS at 12:43

## 2021-03-22 RX ADMIN — VANCOMYCIN HYDROCHLORIDE 1250 MG: 750 INJECTION, POWDER, LYOPHILIZED, FOR SOLUTION INTRAVENOUS at 21:36

## 2021-03-22 RX ADMIN — ACETAMINOPHEN 1000 MG: 500 TABLET ORAL at 10:41

## 2021-03-22 RX ADMIN — PHENYLEPHRINE HYDROCHLORIDE 100 MCG: 10 INJECTION, SOLUTION INTRAMUSCULAR; INTRAVENOUS; SUBCUTANEOUS at 12:16

## 2021-03-22 RX ADMIN — OXYCODONE HYDROCHLORIDE 10 MG: 5 TABLET ORAL at 23:50

## 2021-03-22 RX ADMIN — DEXAMETHASONE SODIUM PHOSPHATE 8 MG: 4 INJECTION, SOLUTION INTRAMUSCULAR; INTRAVENOUS at 11:01

## 2021-03-22 RX ADMIN — MIDAZOLAM HYDROCHLORIDE 2 MG: 1 INJECTION, SOLUTION INTRAMUSCULAR; INTRAVENOUS at 11:03

## 2021-03-22 RX ADMIN — ONDANSETRON 4 MG: 2 INJECTION INTRAMUSCULAR; INTRAVENOUS at 11:02

## 2021-03-22 RX ADMIN — PHENYLEPHRINE HYDROCHLORIDE 100 MCG: 10 INJECTION, SOLUTION INTRAMUSCULAR; INTRAVENOUS; SUBCUTANEOUS at 12:04

## 2021-03-22 RX ADMIN — ROCURONIUM BROMIDE 50 MG: 10 INJECTION INTRAVENOUS at 11:38

## 2021-03-22 RX ADMIN — LIDOCAINE HYDROCHLORIDE 0.4 ML: 10 INJECTION, SOLUTION EPIDURAL; INFILTRATION; INTRACAUDAL; PERINEURAL at 10:30

## 2021-03-22 RX ADMIN — PHENYLEPHRINE HYDROCHLORIDE 100 MCG: 10 INJECTION, SOLUTION INTRAMUSCULAR; INTRAVENOUS; SUBCUTANEOUS at 13:39

## 2021-03-22 RX ADMIN — FAMOTIDINE 40 MG: 20 TABLET, FILM COATED ORAL at 21:03

## 2021-03-22 RX ADMIN — MELOXICAM 15 MG: 7.5 TABLET ORAL at 10:42

## 2021-03-22 RX ADMIN — LIDOCAINE HYDROCHLORIDE 50 MG: 20 INJECTION, SOLUTION INFILTRATION; PERINEURAL at 11:38

## 2021-03-22 RX ADMIN — BUPIVACAINE HYDROCHLORIDE AND EPINEPHRINE BITARTRATE 30 ML: 5; .005 INJECTION, SOLUTION EPIDURAL; INTRACAUDAL; PERINEURAL at 11:17

## 2021-03-22 RX ADMIN — GLYCOPYRROLATE 0.4 MG: 0.2 INJECTION INTRAMUSCULAR; INTRAVENOUS at 14:28

## 2021-03-22 RX ADMIN — SODIUM CHLORIDE 100 ML/HR: 9 INJECTION, SOLUTION INTRAVENOUS at 16:57

## 2021-03-22 RX ADMIN — ROCURONIUM BROMIDE 15 MG: 10 INJECTION INTRAVENOUS at 13:01

## 2021-03-22 NOTE — INTERVAL H&P NOTE
"H&P reviewed. The patient was examined and there are no changes to the H&P.    Vitals:    03/22/21 1028 03/22/21 1112 03/22/21 1117   BP: (!) 183/99 163/77 155/62   BP Location:  Left arm Left arm   Patient Position:  Lying Lying   Pulse: 69 66 71   Resp: 10 12 12   Temp: 97.7 °F (36.5 °C)     TempSrc: Oral     SpO2: 97% 96% 97%   Weight: 75.5 kg (166 lb 6.4 oz)     Height: 160 cm (63\")         "

## 2021-03-22 NOTE — OP NOTE
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     SURGEON: Derrick Pelayo MD     ASSISTANT: Cornelio Fu-retraction, irrigation, closure    ANESTHESIA: General endotracheal anesthesia with regional block.     ESTIMATED BLOOD LOSS:250 mL     DRAIN: None.     COMPLICATION: None.     SPECIMEN: None.     FLUID: per anesthesia    URINE OUTPUT: Not recorded.     IMPLANTS: Ninoska reverse shoulder 8 mm humeral stem x 130 mm length with a +0 mm offset polyethylene liner,  Biomet mini comprehensive baseplate +3 lateral offset with 25 mm central screw and 4 peripheral screws measuring 15, 15, 25, and 20. 36 mm glenosphere with 0.5 inferior offset.     FINDINGS: Examination under anesthesia: Passive forward flexion to 100; passive external rotation to 15'; no anterior, posterior, or inferior laxity noted; no open wounds, lacerations, or abrasions over the left shoulder; 2+ radial pulse right wrist.     INDICATIONS: The patient is a pleasant 75 y.o. year-old female who had remote history of proximal humerus fracture treated nonoperatively with collapse of the humeral head and nonunion as well as development of avascular necrosis. Given the patient's persistence of pain as well as failure of improvement with conservative treatment, including but not limited to physical therapy, injections, activity modification, anti-inflammatory medications, wished to proceed with the above-mentioned procedure.     INFORMED CONSENT: Patient was explained details of the procedure, as well as risks, benefits, and alternatives as documented on the history and physical, and had all questions answered prior to signing the operative consent form. No guarantees were given in regards to results of the  surgery.     DESCRIPTION OF PROCEDURE: The patient was seen, evaluated, and cleared for surgery by Anesthesia. Was met in the preoperative holding area. Operative site was marked, consent was reviewed, history and physical was updated, and preoperative labs were reviewed. Regional block was then placed per Anesthesia and the patient was taken to the operating room and placed in a supine position on the beach chair table. After successful intubation per Anesthesia, the patient was elevated into a slightly seated position approximately 30 degrees of elevation. Head was secured with a facemask. Neck was noted to be in normal anatomic alignment. The patient was secured to the table with a waist strap and all bony prominences were well-padded. Examination under anesthesia was carried out at this point in time. The right arm was then sterilely prepped and draped in a standard fashion.     A formal timeout was completed, including confirmation of history and physical, operative consent, surgical site, patient identification number, and preoperative antibiotic administration. The procedure was then begun with a 10 cm longitudinal incision over the deltopectoral interval heading towards the coracoid proximally with incision through the skin only with a #15 blade followed by subcutaneous dissection with Metzenbaum scissors. Deltopectoral interval was identified at this point in time. Interval was opened, the cephalic vein was protected, and a retractor was placed with adhesions being bluntly dissected in the subacromial and subdeltoid spaces. Brown retractor was placed at this point in time. The three sisters vessels were ligated along the medial neck and biceps tendon was identified at this point in time.     Biceps was noted to be ruptured.  Medial portion of the bicipital groove was used to then identify lesser tuberosity where the subscapularis was noted to be partially intact. This was released from the lesser tuberosity  and the proximal humerus was externally rotated and dislocated at this time.  There were noted to be several large osteochondral loose bodies in the joint as well as significant collapse of the humeral head    Inferior capsule was released from the humeral neck around to the midaxillary line and full exposure of the proximal humerus was obtained at this time.  Large osteophytes are noted that may have been residual effects from displaced fragments of proximal humerus fracture and these were excised with rongeur. Attention was then turned to preparation of the proximal humerus with the opening reamer, progressing in stepwise fashion to a size 9 mm reamer at the bicipital groove. Once a scratch fit was noted with a 9 mm reamer, it was left in place at this point in time. Cutting guide was then positioned in 25 degrees of retroversion and confirmed to be appropriately reproduce patient's native retroversion. Cutting guide was then pinned into position at this point in time, reamer was removed, and an oscillating saw was used in standard fashion to make the proximal humerus cut.  Additional osteophytes were then removed at this point in time.  The metaphyseal reamer followed by the conical reamer were then used and a size 9 trial stem was placed.  Given the lack of significant bony contact as well as significant deformity of the proximal humerus, we elected to proceed with planning for cementing of the stem.    Attention was then turned to glenoid exposure with posterior and anterior glenoid retractors being placed at this point in time. The biceps tendon stump was then identified at this point in time and used to jael the 12-o'clock location, followed by marking of the 3, 6, and 9-o'clock locations on the glenoid face. Labrum was excised 360 degrees from the margins of the glenoid at this time. The Balaya signature patient specific guide was placed on the glenoid at this point in time followed by placement of central  wire. A one-step reamer was then used over the wire and advanced until smooth glenoid surface achieved with reaming. The baseplate was then opened on the back table, placed on the impactor, and impacted into position onto the glenoid at this time with good fit noted.  Central wire was removed, depth gauge used to assess the length, and then screw of 25 mm in length placed of the central hole with good bicortical bite noted at this time.  4 peripheral screws were drilled in bicortical fashion utilizing the locking guide, measured, and screw length of 20 mm placed superior, 25 mm placed inferior, 15 mm placed anterior and 15 mm placed posterior with good bicortical bite noted.  Baseplate was then thoroughly irrigated with Betadine lavage followed by normal saline at this time.    Size 36 Glenosphere was assembled on the back table with 0.5 inferior offset.  Glenosphere was then brought to the surgical site, placed onto the baseplate, and impacted into position with a secure fit of the Gerber taper noted with no evidence of disengagement on pulling with a right angle from all sides.     Attention was then returned to the proximal humerus where the trial stem was removed. The canal was thoroughly irrigated at this point in time with Betadine lavage followed by normal saline. The final 8 mm humeral stem implant was opened on the back table given the fact that we were going to proceed with cementation.  Canal was then thoroughly irrigated with pulsatile lavage, cement restrictor was placed, and antibiotic cement was mixed on the back table using third-generation cement technique.  Cement was then inserted into the humeral canal with pressurization. Humeral stem was then inserted into position in the humeral canal with appropriate version as noted with the version bars.  Once cement was appropriately cured, trial liners were placed at this time, the shoulder was reduced, and noted to be stable throughout range of motion,  with appropriate tension on the strap muscles. Full motion was achieved and dislocation had to be performed with 1 fingertip snugly fit in the joint space. The shoulder was then dislocated once again and trial liner was removed. Final implant was opened on the back table. Surrounding tissue was thoroughly irrigated. Then 20 mL of Exparel injection was injected in the periosteal layers on both the glenoid and humeral sides and the implant liner was impacted into position at this point and the shoulder was reduced and noted to be stable throughout range of motion once again. The joint was then thoroughly irrigated once again with normal saline, followed by a Betadine wash, followed by normal saline once again.  1 g of vancomycin powder was placed in the deep and subcutaneous tissue at this time.    Attention was then turned to closure of the wound with 0 Vicryl used for closure of the deltopectoral layer, 2-0 and 3-0 Stratafix for subcutaneous closure, followed by mesh and glue for skin closure. The wound was dressed with Telfa, 4 x 4, Tegaderm, ABD pad, and Medipore tape. The patient was placed in a sling with an abduction pillow to the operative side.     At the end of the procedure, all lap, needle, and sponge counts were correct x2. The patient had brisk capillary refill to all digits of the operative extremity. Compartments were soft and easily compressible at the end of the procedure.     DISPOSITION: The patient was extubated per Anesthesia and taken to the recovery room in stable condition. Will be admitted to the Orthopedic service for pain control and kept in a sling for 4 weeks with range of motion of the elbow, wrist, hand initiated within the 1st week as well as gentle pendulum exercises of shoulder. Results of the procedure were discussed immediately postoperatively with the patient's family, and they had all questions answered at that time.     Derrick Pelayo M.D.*

## 2021-03-22 NOTE — ANESTHESIA POSTPROCEDURE EVALUATION
Patient: Britta Armijo    Procedure Summary     Date: 03/22/21 Room / Location:  [facility] /  [facility]    Anesthesia Start: 1136 Anesthesia Stop: 1453    Procedure: TOTAL SHOULDER REVERSE ARTHROPLASTY (Right Shoulder) Diagnosis:       Post-traumatic osteoarthritis, right shoulder      (Post-traumatic osteoarthritis, right shoulder [M19.111])    Surgeons: Derrick Pelayo MD Provider: Jos eManuel Bashir CRNA    Anesthesia Type: general with block ASA Status: 2          Anesthesia Type: general with block    Vitals  Vitals Value Taken Time   /62 03/22/21 1610   Temp     Pulse 60 03/22/21 1614   Resp 14 03/22/21 1605   SpO2 96 % 03/22/21 1614   Vitals shown include unvalidated device data.        Post Anesthesia Care and Evaluation    Patient location during evaluation: bedside  Patient participation: complete - patient participated  Level of consciousness: awake and alert  Pain score: 0  Pain management: adequate  Airway patency: patent  Anesthetic complications: No anesthetic complications  PONV Status: none  Cardiovascular status: acceptable  Respiratory status: acceptable  Hydration status: acceptable

## 2021-03-22 NOTE — PLAN OF CARE
Goal Outcome Evaluation:  Plan of Care Reviewed With: patient  Progress: improving  Outcome Summary: Admitted from PACU,V/S stable , up to BR with assist , good pain control .

## 2021-03-22 NOTE — ANESTHESIA PROCEDURE NOTES
Airway  Urgency: elective    Date/Time: 3/22/2021 11:42 AM  Airway not difficult    General Information and Staff    Patient location during procedure: OR  CRNA: Jose Manuel Bashir CRNA    Indications and Patient Condition  Indications for airway management: airway protection    Preoxygenated: yes  Mask difficulty assessment: 0 - not attempted    Final Airway Details  Final airway type: endotracheal airway      Successful airway: ETT  Cuffed: yes   Successful intubation technique: direct laryngoscopy  Endotracheal tube insertion site: oral  Blade: Victor  Blade size: 2  ETT size (mm): 7.0  Cormack-Lehane Classification: grade I - full view of glottis  Placement verified by: chest auscultation and capnometry   Measured from: lips  ETT/EBT  to lips (cm): 21  Number of attempts at approach: 1  Assessment: lips, teeth, and gum same as pre-op and atraumatic intubation

## 2021-03-22 NOTE — ANESTHESIA PROCEDURE NOTES
Peripheral Block      Patient reassessed immediately prior to procedure    Patient location during procedure: pre-op  Start time: 3/22/2021 11:12 AM  Stop time: 3/22/2021 11:17 AM  Performed by  CRNA: Heri Winchester CRNA  Preanesthetic Checklist  Completed: patient identified, IV checked, site marked, risks and benefits discussed, surgical consent, monitors and equipment checked, pre-op evaluation and timeout performed  Prep:  Pt Position: supine  Sterile barriers:cap, gloves, mask and washed/disinfected hands  Prep: ChloraPrep  Patient monitoring: blood pressure monitoring, continuous pulse oximetry and EKG  Procedure  Sedation:yes  Performed under: PNB  Guidance:ultrasound guided  ULTRASOUND INTERPRETATION.  Using ultrasound guidance a 22 G gauge needle was placed in close proximity to the brachial plexus nerve, at which point, under ultrasound guidance anesthetic was injected in the area of the nerve and spread of the anesthesia was seen on ultrasound in close proximity thereto.  There were no abnormalities seen on ultrasound; a digital image was taken; and the patient tolerated the procedure with no complications. Images:still images obtained, printed/placed on chart    Laterality:right  Block Type:interscalene  Injection Technique:single-shot  Needle Type:echogenic  Needle Gauge:22 G      Medications Used: bupivacaine 0.5%-EPINEPHrine PF (MARCAINE w/ EPI) injection, 30 mL  Med admintered at 3/22/2021 11:17 AM      Post Assessment  Injection Assessment: negative aspiration for heme, no paresthesia on injection and incremental injection  Patient Tolerance:comfortable throughout block  Complications:yes

## 2021-03-22 NOTE — DISCHARGE INSTRUCTIONS
Total Shoulder Joint Replacement Discharge Instructions:    I. ACTIVITIES:  1. Exercises:  ? Complete exercise program as taught by the hospital physical therapist 2 times per day  ? Wear sling when in bed and when out of bed (except when doing exercises)  ? Exercise program will be advanced by the physical therapist  ? During the day be up ambulating every 2 hours (while awake) for short distances  ? Complete the ankle pump exercises at least 10 times per hour (while awake)  ? Elevate operative arm when in bed and for at least 30 minutes during the day.   ? Use cold packs 20-30 minutes approximately 5 times per day. This should be done before and after completing your exercises and at any time you are experiencing pain/ stiffness in your operative extremity.    2. Activities of Daily Living:  ? No tub baths, hot tubs, or swimming pools for 4 weeks  ? May shower and let water run over the adhesive dressing on post-operative day #7 if no drainage. If dressing does come off, cover incision with waterproof band-aids while showering until first post-op visit in the office.     II. RESTRICTIONS  ? No pushing, pulling, lifting, or weight bearing on operative extremity  ? Avoid pushing yourself up out of a chair with the operative extremity  ? Continue to follow shoulder precautions as instructed by hospital physical therapist  ? Your surgeon will discuss with you when you will be able to drive again.  ? First week stay inside on even terrain. May go up and down stairs one stair at a time utilizing the hand rail  ? After one week, you may venture outside.    III. PRECAUTIONS:  ? Everyone that comes near you should wash their hands  ? No elective dental, genital-urinary, or colon procedures or surgical procedures for 12 weeks after surgery unless absolutely necessary.  ?  If dental work or surgical procedure is deemed absolutely necessary, you will need to contact your surgeon as you will need to take antibiotics 1 hour  prior to any dental work (including teeth cleanings).  ? Please discuss with your surgeon prophylactic antibiotics as the length of time this intervention will be necessary for you varies with each patient’s health history and situation.  ? Avoid sick people. If you must be around someone who is ill, they should wear a mask.  ? Avoid visits to the Emergency Room or Urgent Care unless you are having a life threatening event.     IV. INCISION CARE:  ? Keep clear adhesive dressing and gauze in place until follow up visit. If this dressing does come off, then cover with dry gauze and paper tape daily.Wash your hands prior to dressing changes  ? No creams or ointments to the incisionMay remove dressing once the incision is free of drainage  ? Do not touch or pick at the incision  ? Check dressing every day and notify surgeon immediately if any of the following signs or symptoms are noted:  o Increase in redness  o Increase in swelling around the incision and of the entire extremity  o Increase in pain  o Drainage oozing from the incision  o Pulling apart of the edges of the incision  o Increase in overall body temperature (greater than 100.5 degrees)  ? Any visible sutures or staples will be removed at your 2 week follow up as long as the incision is healing appropriately.     V. MEDICATIONS:     1. Stool Softeners: You will be at greater risk of constipation after surgery due to being less mobile and the pain medications.   ? Take stool softeners as instructed by your surgeon while on pain medications. Over the counter Colace 100 mg 1-2 capsules twice daily.   ? If stools become too loose or too frequent, please decreases the dosage or stop the stool softener.  ? If constipation occurs despite use of stool softeners, you are to continue the stool softeners and add a laxative (Milk of Magnesia 1 ounce daily as needed)  ? Drink plenty of fluids, and eat fruits and vegetables during your recovery time    2. Pain  Medications utilized after surgery are narcotics and the law requires that the following information be given to all patients that are prescribed narcotics:  ? CLASSIFICATION: Pain medications are called Opioids and are narcotics  ? LEGALITIES: It is illegal to share narcotics with others and to drive within 24 hours of taking narcotics  ? POTENTIAL SIDE EFFECTS: Potential side effects of opioids include: nausea, vomiting, itching, dizziness, drowsiness, dry mouth, constipation, and difficulty urinating.  ? POTENTIAL ADVERSE EFFECTS:   o Opioid tolerance can develop with use of pain medications and this simply means that it requires more and more of the medication to control pain; however, this is seen more in patients that use opioids for longer periods of time.  o Opioid dependence can develop with use of Opioids and this simply means that to stop the medication can cause withdrawal symptoms; however, this is seen with patients that use Opioids for longer periods of time.  o Opioid addiction can develop with use of Opioids and the incidence of this is very unlikely in patients who take the medications as ordered and stop the medications as instructed.  o Opioid overdose can be dangerous, but is unlikely when the medication is taken as ordered and stopped when ordered. It is important not to mix opioids with alcohol or with and type of sedative such as Benadryl as this can lead to over sedation and respiratory difficulty.  ? DOSAGE:   o Pain medications will need to be taken consistently for the first week to decrease pain and promote adequate pain relief and participation in physical therapy.  o After the initial surgical pain begins to resolve, you may begin to decrease the pain medication. By the end of 6 weeks, you should be off of pain medications.  o Refills will not be given by the office during evening hours, on weekends, or after 12 weeks post-op.  o To seek refills on pain medications during the initial 6  week post-operative period, you must call the office 48 hours in advance to request the refill. The office will then notify you when to  the prescription. DO NOT wait until you are out of the medication to request a refill.    VI. FOLLOW-UP VISITS:  ? You will need to follow up in the office with МАРИНА Chavez in 2 weeks. Please call [phone] to schedule this appointment.  ? You will need to follow up with your primary care physician within 4 weeks.  ? If you have any concerns or suspected complications prior to your follow up visit, please call your surgeons office. Do not wait until your appointment time if you suspect complications. These will need to be addressed in the office promptly.

## 2021-03-22 NOTE — ANESTHESIA PREPROCEDURE EVALUATION
Anesthesia Evaluation     Patient summary reviewed and Nursing notes reviewed   NPO Solid Status: > 8 hours  NPO Liquid Status: > 2 hours           Airway   Mallampati: II  TM distance: <3 FB  No difficulty expected  Dental    (+) upper dentures    Pulmonary - normal exam   (+) shortness of breath,   Cardiovascular - normal exam  Exercise tolerance: poor (<4 METS)    ECG reviewed    (+) hypertension poorly controlled, past MI  >12 months, angina, hyperlipidemia,     ROS comment: NORMAL ECG -  Sinus rhythm  NO PRIOR TRACING AVAILABLE FOR COMPARISON    Cardiac clearance per Dr Velasquez.      MI over 10yrs ago    Neuro/Psych  (+) psychiatric history,     GI/Hepatic/Renal/Endo    (+)  GERD,      Musculoskeletal     Abdominal  - normal exam   Substance History - negative use     OB/GYN negative ob/gyn ROS         Other   arthritis,                    Anesthesia Plan    ASA 2     general with block     intravenous induction     Anesthetic plan, all risks, benefits, and alternatives have been provided, discussed and informed consent has been obtained with: patient.  Use of blood products discussed with patient  Consented to blood products.

## 2021-03-23 ENCOUNTER — TELEPHONE (OUTPATIENT)
Dept: ORTHOPEDIC SURGERY | Facility: CLINIC | Age: 76
End: 2021-03-23

## 2021-03-23 VITALS
WEIGHT: 166.4 LBS | SYSTOLIC BLOOD PRESSURE: 138 MMHG | BODY MASS INDEX: 29.48 KG/M2 | DIASTOLIC BLOOD PRESSURE: 58 MMHG | HEART RATE: 58 BPM | RESPIRATION RATE: 20 BRPM | OXYGEN SATURATION: 96 % | TEMPERATURE: 98 F | HEIGHT: 63 IN

## 2021-03-23 LAB
ANION GAP SERPL CALCULATED.3IONS-SCNC: 8.5 MMOL/L (ref 5–15)
BACTERIA SPEC AEROBE CULT: ABNORMAL
BACTERIA SPEC AEROBE CULT: ABNORMAL
BUN SERPL-MCNC: 21 MG/DL (ref 8–23)
BUN/CREAT SERPL: 20.6 (ref 7–25)
CALCIUM SPEC-SCNC: 8.2 MG/DL (ref 8.6–10.5)
CHLORIDE SERPL-SCNC: 98 MMOL/L (ref 98–107)
CO2 SERPL-SCNC: 25.5 MMOL/L (ref 22–29)
CREAT SERPL-MCNC: 1.02 MG/DL (ref 0.57–1)
DEPRECATED RDW RBC AUTO: 44.3 FL (ref 37–54)
ERYTHROCYTE [DISTWIDTH] IN BLOOD BY AUTOMATED COUNT: 12.6 % (ref 12.3–15.4)
GFR SERPL CREATININE-BSD FRML MDRD: 53 ML/MIN/1.73
GLUCOSE SERPL-MCNC: 160 MG/DL (ref 65–99)
HCT VFR BLD AUTO: 35.4 % (ref 34–46.6)
HGB BLD-MCNC: 10.9 G/DL (ref 12–15.9)
MCH RBC QN AUTO: 29.6 PG (ref 26.6–33)
MCHC RBC AUTO-ENTMCNC: 30.8 G/DL (ref 31.5–35.7)
MCV RBC AUTO: 96.2 FL (ref 79–97)
PLATELET # BLD AUTO: 312 10*3/MM3 (ref 140–450)
PMV BLD AUTO: 9.2 FL (ref 6–12)
POTASSIUM SERPL-SCNC: 4.8 MMOL/L (ref 3.5–5.2)
RBC # BLD AUTO: 3.68 10*6/MM3 (ref 3.77–5.28)
SODIUM SERPL-SCNC: 132 MMOL/L (ref 136–145)
WBC # BLD AUTO: 15.77 10*3/MM3 (ref 3.4–10.8)

## 2021-03-23 PROCEDURE — 80048 BASIC METABOLIC PNL TOTAL CA: CPT | Performed by: ORTHOPAEDIC SURGERY

## 2021-03-23 PROCEDURE — 94799 UNLISTED PULMONARY SVC/PX: CPT

## 2021-03-23 PROCEDURE — 97161 PT EVAL LOW COMPLEX 20 MIN: CPT

## 2021-03-23 PROCEDURE — 85027 COMPLETE CBC AUTOMATED: CPT | Performed by: ORTHOPAEDIC SURGERY

## 2021-03-23 PROCEDURE — 97165 OT EVAL LOW COMPLEX 30 MIN: CPT

## 2021-03-23 PROCEDURE — 25010000002 MORPHINE PER 10 MG: Performed by: ORTHOPAEDIC SURGERY

## 2021-03-23 RX ORDER — CEFUROXIME AXETIL 250 MG/1
250 TABLET ORAL EVERY 12 HOURS SCHEDULED
Status: DISCONTINUED | OUTPATIENT
Start: 2021-03-23 | End: 2021-03-23 | Stop reason: HOSPADM

## 2021-03-23 RX ORDER — OXYCODONE HYDROCHLORIDE AND ACETAMINOPHEN 5; 325 MG/1; MG/1
TABLET ORAL
Qty: 36 TABLET | Refills: 0 | Status: CANCELLED | OUTPATIENT
Start: 2021-03-23

## 2021-03-23 RX ORDER — ONDANSETRON 4 MG/1
4 TABLET, FILM COATED ORAL EVERY 8 HOURS PRN
Qty: 20 TABLET | Refills: 0 | Status: CANCELLED | OUTPATIENT
Start: 2021-03-23

## 2021-03-23 RX ORDER — MELOXICAM 15 MG/1
15 TABLET ORAL DAILY
Qty: 30 TABLET | Refills: 0 | Status: CANCELLED | OUTPATIENT
Start: 2021-03-23

## 2021-03-23 RX ORDER — CEFUROXIME AXETIL 250 MG/1
250 TABLET ORAL EVERY 12 HOURS SCHEDULED
Qty: 13 TABLET | Refills: 0 | Status: SHIPPED | OUTPATIENT
Start: 2021-03-23 | End: 2021-03-30

## 2021-03-23 RX ORDER — MELOXICAM 15 MG/1
15 TABLET ORAL DAILY
Qty: 30 TABLET | Refills: 0 | Status: SHIPPED | OUTPATIENT
Start: 2021-03-24 | End: 2021-04-22

## 2021-03-23 RX ORDER — ASPIRIN 81 MG/1
81 TABLET ORAL DAILY
Qty: 30 TABLET | Refills: 0 | Status: CANCELLED | OUTPATIENT
Start: 2021-03-23

## 2021-03-23 RX ORDER — OXYCODONE HYDROCHLORIDE AND ACETAMINOPHEN 5; 325 MG/1; MG/1
TABLET ORAL
Qty: 36 TABLET | Refills: 0 | Status: SHIPPED | OUTPATIENT
Start: 2021-03-23 | End: 2021-05-11

## 2021-03-23 RX ORDER — ONDANSETRON 4 MG/1
4 TABLET, FILM COATED ORAL EVERY 8 HOURS PRN
Qty: 20 TABLET | Refills: 0 | Status: SHIPPED | OUTPATIENT
Start: 2021-03-23 | End: 2021-05-11

## 2021-03-23 RX ORDER — CHOLECALCIFEROL (VITAMIN D3) 125 MCG
5 CAPSULE ORAL NIGHTLY PRN
Qty: 30 TABLET | Refills: 0 | Status: CANCELLED | OUTPATIENT
Start: 2021-03-23

## 2021-03-23 RX ORDER — ASPIRIN 81 MG/1
81 TABLET ORAL DAILY
Qty: 30 TABLET | Refills: 0 | Status: SHIPPED | OUTPATIENT
Start: 2021-03-24

## 2021-03-23 RX ADMIN — OXYCODONE HYDROCHLORIDE 5 MG: 5 TABLET ORAL at 05:08

## 2021-03-23 RX ADMIN — CEFUROXIME AXETIL 250 MG: 250 TABLET, FILM COATED ORAL at 09:10

## 2021-03-23 RX ADMIN — SODIUM CHLORIDE 100 ML/HR: 9 INJECTION, SOLUTION INTRAVENOUS at 05:20

## 2021-03-23 RX ADMIN — ACETAMINOPHEN 975 MG: 325 TABLET ORAL at 09:05

## 2021-03-23 RX ADMIN — ESCITALOPRAM OXALATE 20 MG: 10 TABLET, FILM COATED ORAL at 06:12

## 2021-03-23 RX ADMIN — MELOXICAM 15 MG: 7.5 TABLET ORAL at 09:05

## 2021-03-23 RX ADMIN — MORPHINE SULFATE 6 MG: 10 INJECTION, SOLUTION INTRAMUSCULAR; INTRAVENOUS at 07:00

## 2021-03-23 RX ADMIN — ACETAMINOPHEN 975 MG: 325 TABLET ORAL at 11:44

## 2021-03-23 RX ADMIN — OXYCODONE HYDROCHLORIDE 5 MG: 5 TABLET ORAL at 11:45

## 2021-03-23 RX ADMIN — LOSARTAN POTASSIUM: 50 TABLET, FILM COATED ORAL at 09:05

## 2021-03-23 RX ADMIN — ASPIRIN 81 MG: 81 TABLET, COATED ORAL at 09:05

## 2021-03-23 NOTE — PLAN OF CARE
Goal Outcome Evaluation:        Outcome Summary: OT evaluation completed. Education provided regarding activity restrictions following shoulder surgery and impact on daily tasks. Pt states she will have support from family/friends for adl/IADL tasks. Pt currently needs min assist for adl tasks due to limited use of RUE. She managed transfers and mobility with CGA. Education was provided regarding arom of distal RUE and modified pendulums of right shoulder. Pt needs cues. Rec suppport at home and  services.

## 2021-03-23 NOTE — THERAPY DISCHARGE NOTE
Acute Care - Occupational Therapy Discharge   [facility] Drew    Patient Name: Britta Armijo  : 1945    MRN: [id number]                              Today's Date: 3/23/2021       Admit Date: 3/22/2021    Visit Dx:     ICD-10-CM ICD-9-CM   1. Post-traumatic osteoarthritis, right shoulder  M19.111 715.21     Patient Active Problem List   Diagnosis   • Post-traumatic osteoarthritis, right shoulder   • Acute pain of right shoulder   • Atypical ductal hyperplasia of left breast     Past Medical History:   Diagnosis Date   • Arthritis of back     NECK & LOWER BACK   • Fracture, humerus    • Generalized anxiety disorder with panic attacks    • GERD (gastroesophageal reflux disease)    • Heart attack (CMS/HCC)     YEARS AGO   • Hyperlipidemia    • Hypertension    • Lumbosacral disc disease    • MRSA (methicillin resistant staph aureus) culture positive 2018    POSITIVE NASAL SWAB PRIOR TO HIP SURGERY   • Osteoporosis    • Right shoulder pain     SCHEDULED FOR TOTAL SHOULDER   • Unable to read or write     UNABLE TO READ     Past Surgical History:   Procedure Laterality Date   • APPENDECTOMY     • BREAST LUMPECTOMY Left     BENIGN   • BREAST SURGERY  2020   • CATARACT EXTRACTION, BILATERAL     • CHOLECYSTECTOMY OPEN     • EPIDURAL BLOCK     • HEMORRHOIDECTOMY      X4   • HIP SURGERY Right     Replacement   • HYSTERECTOMY     • TUMOR REMOVAL  2017    RIGHT ARM      General Information     Row Name 21 1222          OT Time and Intention    Document Type  discharge evaluation/summary  -SD     Mode of Treatment  occupational therapy  -SD     Row Name 21 1222          General Information    Patient Profile Reviewed  yes  -SD     Prior Level of Function  independent:;ADL's;all household mobility  -SD     Existing Precautions/Restrictions  fall NWB of RUE  -SD     Row Name 21 1222          Occupational Profile    Reason for Services/Referral (Occupational Profile)  s/p reverse total shoulder sx,  decreased adl function  -SD     Successful Occupations (Occupational Profile)  independent with adl tasks prior to sx  -SD     Environmental Supports and Barriers (Occupational Profile)  pt has family and neighbors that provide support  -SD     Patient Goals (Occupational Profile)  go home  -SD     Row Name 03/23/21 1222          Living Environment    Lives With  alone  -SD     Row Name 03/23/21 1222          Home Main Entrance    Number of Stairs, Main Entrance  none  -SD     Row Name 03/23/21 1222          Cognition    Orientation Status (Cognition)  oriented to;person;place;time  -SD       User Key  (r) = Recorded By, (t) = Taken By, (c) = Cosigned By    Initials Name Provider Type    SD Jose Alejandro Rosario OTR Occupational Therapist        Mobility/ADL's     Row Name 03/23/21 1227          Transfers    Sit-Stand [place] (Transfers)  contact guard  -SD     Row Name 03/23/21 1227          Functional Mobility    Functional Mobility- Ind. Level  contact guard assist  -SD     Functional Mobility-Distance (Feet)  100  -SD     Row Name 03/23/21 1227          Activities of Daily Living    BADL Assessment/Intervention  upper body dressing;lower body dressing;bathing  -SD     Row Name 03/23/21 1227          Upper Body Dressing Assessment/Training    [place] Level (Upper Body Dressing)  upper body dressing skills;minimum assist (75% patient effort)  -SD     Comment (Upper Body Dressing)  Pt needs min assist for adl activity due to limited use of RUE following surgery. Pt understands the need for assistance. She states family and friends are going to provide support/assistance  -SD     Row Name 03/23/21 1227          Lower Body Dressing Assessment/Training    [place] Level (Lower Body Dressing)  lower body dressing skills;minimum assist (75% patient effort)  -SD     Row Name 03/23/21 1227          Bathing Assessment/Intervention    [place] Level (Bathing)  bathing skills;minimum assist (75% patient  effort)  -SD       User Key  (r) = Recorded By, (t) = Taken By, (c) = Cosigned By    Initials Name Provider Type    Jose Alejandro Dunbar, OTR Occupational Therapist        Obj/Interventions     Row Name 03/23/21 1229          Range of Motion Comprehensive    Comment, General Range of Motion  LUE rom wfl. Pt demonstrated functional arom of distal RUE. Education provided regarding arom of distal RUE and modified pendulums for right shoulder  -SD     Row Name 03/23/21 1229          Strength Comprehensive (MMT)    Comment, General Manual Muscle Testing (MMT) Assessment  LUE wfl. RUE not tested due to MMT  -SD       User Key  (r) = Recorded By, (t) = Taken By, (c) = Cosigned By    Initials Name Provider Type    Jose Alejandro Dunbar, OTR Occupational Therapist        Goals/Plan    No documentation.       Clinical Impression     Row Name 03/23/21 1242          Pain Scale: Numbers Pre/Post-Treatment    Pretreatment Pain Rating  0/10 - no pain  -SD     Posttreatment Pain Rating  0/10 - no pain  -SD     Row Name 03/23/21 1242          Plan of Care Review    Outcome Summary  OT evaluation completed. Education provided regarding activity restrictions following shoulder surgery and impact on daily tasks. Pt states she will have suppot from family/friends for adl/IADL tasks. Pt currently needs min assist for adl tasks due to limited use of RUE. She managed transfers and mobility with CGA. Education was provided regarding arom of distal RUE and modified pendulums of right shoulder. Pt needs cues. Rec suppport at home and  services.  -SD     Row Name 03/23/21 1242          Therapy Assessment/Plan (OT)    Patient/Family Therapy Goal Statement (OT)  go home  -SD     Therapy Frequency (OT)  evaluation only  -SD     Row Name 03/23/21 1242          Therapy Plan Review/Discharge Plan (OT)    Anticipated Discharge Disposition (OT)  home with home health;home with 24/7 care  -SD     Row Name 03/23/21 1242          Positioning and  Restraints    Pre-Treatment Position  sitting in chair/recliner  -SD     Post Treatment Position  chair  -SD     In Chair  sitting;call light within reach;encouraged to call for assist  -SD       User Key  (r) = Recorded By, (t) = Taken By, (c) = Cosigned By    Initials Name Provider Type    SD Jose Alejandro Rosario OTR Occupational Therapist        Outcome Measures     Row Name 03/23/21 1247          How much help from another is currently needed...    Putting on and taking off regular lower body clothing?  3  -SD     Bathing (including washing, rinsing, and drying)  3  -SD     Toileting (which includes using toilet bed pan or urinal)  3  -SD     Putting on and taking off regular upper body clothing  3  -SD     Taking care of personal grooming (such as brushing teeth)  3  -SD     Eating meals  3  -SD     AM-PAC 6 Clicks Score (OT)  18  -SD     Row Name 03/23/21 1247          Functional Assessment    Outcome Measure Options  AM-PAC 6 Clicks Daily Activity (OT)  -SD       User Key  (r) = Recorded By, (t) = Taken By, (c) = Cosigned By    Initials Name Provider Type    SD Jose Alejandro Rosario OTHAYLIE Occupational Therapist        Occupational Therapy Education                 Title: PT OT SLP Therapies (Resolved)     Topic: Occupational Therapy (Resolved)     Point: ADL training (Resolved)     Description:   Instruct learner(s) on proper safety adaptation and remediation techniques during self care or transfers.   Instruct in proper use of assistive devices.              Learning Progress Summary           Patient Acceptance, E,TB,D, VU,NR by SD at 3/23/2021 1248    Comment: Education with activity restrictions and impact on daily tasks. Education with arom of distal RUE and modified pendulums for right shoulder.                               User Key     Initials Effective Dates Name Provider Type Discipline    SD 07/05/20 -  Jose Alejandro Rosario OTHAYLIE Occupational Therapist OT              OT Recommendation and Plan  Retired  Outcome Summary/Treatment Plan (OT)  Anticipated Discharge Disposition (OT): home with 24/7 care, home with home health  Therapy Frequency (OT): evaluation only  Plan of Care Review  Outcome Summary: OT evaluation completed. Education provided regarding activity restrictions following shoulder surgery and impact on daily tasks. Pt states she will have support from family/friends for adl/IADL tasks. Pt currently needs min assist for adl tasks due to limited use of RUE. She managed transfers and mobility with CGA. Education was provided regarding arom of distal RUE and modified pendulums of right shoulder. Pt needs cues. Rec suppport at home and  services.   Time Calculation:   Time Calculation- OT     Row Name 03/23/21 1252             Time Calculation- OT    OT Start Time  0935  -SD        User Key  (r) = Recorded By, (t) = Taken By, (c) = Cosigned By    Initials Name Provider Type    Jose Alejandro Dunbar OTR Occupational Therapist        Therapy Charges for Today     Code Description Service Date Service Provider Modifiers Qty    [id number]  OT EVAL LOW COMPLEXITY 2 3/23/2021 Jose Alejandro Rosario OTR GO 1               NELSY Falk  3/23/2021

## 2021-03-23 NOTE — PLAN OF CARE
Goal Outcome Evaluation:  Plan of Care Reviewed With: patient     Outcome Summary: dressing right shoudier intact, ice applied, cont on i/v fluid , cont to monitor

## 2021-03-23 NOTE — TELEPHONE ENCOUNTER
Dr. Blackwell calling asking if you competed surgery on Mrs. Armijo and she had a UTI, I informed him that yes you had yesterday 03.22.2021 and he asked if her UTI was treated as he had not as of yet since he sent her to the hospital Friday for a repeat UA, he states that he will call the hospital to address and I informed him that I would send a message to you (Dr. Pelayo as well.)

## 2021-03-23 NOTE — NURSING NOTE
Case Management Discharge Note      Final Note: D/C home with home health    Provided Post Acute Provider List?: N/A  N/A Provider List Comment: No needs at thist time    Selected Continued Care - Discharged on 3/23/2021 Admission date: 3/22/2021 - Discharge disposition: Home or Self Care    Destination    No services have been selected for the patient.              Durable Medical Equipment    No services have been selected for the patient.              Dialysis/Infusion    No services have been selected for the patient.              Home Medical Care Coordination complete    Service Provider Selected Services Address Phone Fax Patient Preferred    [facility] [place]  [facility] [address] [address] [phone] --          Therapy    No services have been selected for the patient.              Community Resources    No services have been selected for the patient.                       Final Discharge Disposition Code: 06 - home with home health care

## 2021-03-23 NOTE — NURSING NOTE
Discharge Planning Assessment  [facility]     Patient Name: Britta Armijo  MRN: [id number]  Today's Date: 3/23/2021    Admit Date: 3/22/2021    Discharge Needs Assessment     Row Name 03/23/21 0949       Living Environment    Lives With  alone    Current Living Arrangements  home/apartment/condo    Primary Care Provided by  self    Provides Primary Care For  no one, unable/limited ability to care for self    Family Caregiver if Needed  other relative(s)    Family Caregiver Names  Cousin Christi    Quality of Family Relationships  helpful;involved;supportive    Able to Return to Prior Arrangements  yes       Resource/Environmental Concerns    Resource/Environmental Concerns  none       Transition Planning    Patient/Family Anticipates Transition to  home    Transportation Anticipated  family or friend will provide       Discharge Needs Assessment    Readmission Within the Last 30 Days  no previous admission in last 30 days    Equipment Currently Used at Home  none    Concerns to be Addressed  discharge planning    Anticipated Changes Related to Illness  none    Equipment Needed After Discharge  none        Discharge Plan     Row Name 03/23/21 0955       Plan    Plan  Home when stable    Provided Post Acute Provider List?  N/A    N/A Provider List Comment  No needs at this time    Patient/Family in Agreement with Plan  yes    Plan Comments  Spoke with Mrs Armijo at bedside.  She lives alone in a home in [place].  She does not have any DME or oxygen at home.  She is independent with her ADL's.  Physical therapy is recommending home health.  Called Lisa with [facility] and they can take the patient.  She no longer drives and her cousin Christi takes her wherever she needs to go.  Her pharmacy is [facility] in [place].  She does not have an advanced directive on file.  Plan is home with home health.  Will continue to follow        Continued Care and Services - Admitted Since 3/22/2021    Coordination has not  been started for this encounter.         Demographic Summary     Row Name 03/23/21 0947       General Information    Admission Type  inpatient    Arrived From  home    Referral Source  admission list    Reason for Consult  discharge planning    Preferred Language  English     Used During This Interaction  no       Contact Information    Permission Granted to Share Info With          Functional Status    No documentation.       Psychosocial    No documentation.       Abuse/Neglect    No documentation.       Legal    No documentation.       Substance Abuse    No documentation.       Patient Forms    No documentation.           Destiney Guajardo RN

## 2021-03-23 NOTE — DISCHARGE SUMMARY
Orthopedic Discharge Summary      Patient: Britta Armijo      YOB: 1945    Medical Record Number: [id number]    Attending Physician: Derrick Pelayo MD  Consulting Physician(s):   Date of Admission: 3/22/2021 10:01 AM  Date of Discharge: 03/23/2021      Patient Active Problem List   Diagnosis   • Post-traumatic osteoarthritis, right shoulder   • Acute pain of right shoulder   • Atypical ductal hyperplasia of left breast     Status Post: DC RECONSTR TOTAL SHOULDER IMPLANT [70787] (TOTAL SHOULDER REVERSE ARTHROPLASTY), RIGHT       Allergies   Allergen Reactions   • Sulfa Antibiotics Nausea And Vomiting and Unknown - Low Severity       Current Medications:     Discharge Medications      New Medications      Instructions Start Date   aspirin 81 MG EC tablet   81 mg, Oral, Daily   Start Date: March 24, 2021     cefuroxime 250 MG tablet  Commonly known as: CEFTIN   250 mg, Oral, Every 12 Hours Scheduled      meloxicam 15 MG tablet  Commonly known as: MOBIC   15 mg, Oral, Daily   Start Date: March 24, 2021     ondansetron 4 MG tablet  Commonly known as: ZOFRAN   4 mg, Oral, Every 8 Hours PRN      oxyCODONE-acetaminophen 5-325 MG per tablet  Commonly known as: PERCOCET   1-2 tablets every 4-6 hours, prn moderate-severe pain         Changes to Medications      Instructions Start Date   diclofenac 1 % gel gel  Commonly known as: VOLTAREN  What changed: Another medication with the same name was removed. Continue taking this medication, and follow the directions you see here.   4 g, Topical, 4 Times Daily         Continue These Medications      Instructions Start Date   albuterol sulfate  (90 Base) MCG/ACT inhaler  Commonly known as: PROVENTIL HFA;VENTOLIN HFA;PROAIR HFA   2 puffs, Inhalation, Every 4 Hours PRN      escitalopram 20 MG tablet  Commonly known as: LEXAPRO   20 mg, Oral, Every Morning      famotidine 40 MG tablet  Commonly known as: PEPCID   40 mg, Oral, Nightly       losartan-hydrochlorothiazide 100-12.5 MG per tablet  Commonly known as: HYZAAR   1 tablet, Oral, Every Morning      Nitrostat 0.4 MG SL tablet  Generic drug: nitroglycerin   0.4 mg, Sublingual, Every 5 Minutes PRN      simvastatin 10 MG tablet  Commonly known as: ZOCOR   10 mg, Oral, Nightly, TAKES EVERY OTHER NIGHT      vitamin D3 125 MCG (5000 UT) capsule capsule   5,000 Units, Oral, Daily                 Past Medical History:   Diagnosis Date   • Arthritis of back     NECK & LOWER BACK   • Fracture, humerus    • Generalized anxiety disorder with panic attacks    • GERD (gastroesophageal reflux disease)    • Heart attack (CMS/HCC)     YEARS AGO   • Hyperlipidemia    • Hypertension    • Lumbosacral disc disease    • MRSA (methicillin resistant staph aureus) culture positive 2018    POSITIVE NASAL SWAB PRIOR TO HIP SURGERY   • Osteoporosis    • Right shoulder pain     SCHEDULED FOR TOTAL SHOULDER   • Unable to read or write     UNABLE TO READ     Past Surgical History:   Procedure Laterality Date   • APPENDECTOMY     • BREAST LUMPECTOMY Left     BENIGN   • BREAST SURGERY  07/03/2020   • CATARACT EXTRACTION, BILATERAL     • CHOLECYSTECTOMY OPEN     • EPIDURAL BLOCK     • HEMORRHOIDECTOMY      X4   • HIP SURGERY Right     Replacement   • HYSTERECTOMY  1991   • TUMOR REMOVAL  2017    RIGHT ARM      Social History     Occupational History   • Not on file   Tobacco Use   • Smoking status: Never Smoker   • Smokeless tobacco: Never Used   Vaping Use   • Vaping Use: Never used   Substance and Sexual Activity   • Alcohol use: Never   • Drug use: Never   • Sexual activity: Defer      Social History     Social History Narrative   • Not on file     Family History   Problem Relation Age of Onset   • Cancer Father         spine   • Diabetes Paternal Aunt    • Diabetes Paternal Uncle          Physical Exam: 75 y.o. female  General Appearance:    Alert, cooperative, in no acute distress                      Vitals:    03/23/21  0004 03/23/21 0513 03/23/21 0706 03/23/21 1151   BP: 138/67 148/68  138/58   BP Location: Left arm Left arm  Left arm   Patient Position: Lying Lying  Lying   Pulse: 55 60  58   Resp: 14 15  20   Temp:  97.6 °F (36.4 °C)  98 °F (36.7 °C)   TempSrc:  Oral  Oral   SpO2: 94% 92% 95% 96%   Weight:       Height:            Head:    Normocephalic, without obvious abnormality, atraumatic   Eyes:            Lids and lashes normal, conjunctivae and sclerae normal, no   icterus, no pallor, corneas clear, PERRLA   Ears:    Ears appear intact with no abnormalities noted   Throat:   No oral lesions, no thrush, oral mucosa moist   Neck:   No adenopathy, supple, trachea midline, no thyromegaly, no    carotid bruit, no JVD   Back:     No kyphosis present, no scoliosis present, no skin lesions,       erythema or scars, no tenderness to percussion or                   palpation,   range of motion normal   Lungs:     Clear to auscultation,respirations regular, even and                   unlabored    Heart:    Regular rhythm and normal rate, normal S1 and S2, no            murmur, no gallop, no rub, no click   Chest Wall:    No abnormalities observed   Abdomen:     Normal bowel sounds, no masses, no organomegaly, soft        non-tender, non-distended, no guarding, no rebound                 tenderness   Rectal:     Deferred   Extremities:   Incision intact without signs or symptoms of infection.               Neurovascular status remains intact to operative extremity.      Moves all extremities well, no edema, no cyanosis, no              redness   Pulses:   Pulses palpable and equal bilaterally   Skin:   No bleeding, bruising or rash   Lymph nodes:   No palpable adenopathy   Neurologic:   Cranial nerves 2 - 12 grossly intact, sensation intact, DTR        present and equal bilaterally           Hospital Course:  75 y.o. female admitted to [facility] to services of Derrick Pelayo MD with Post-traumatic osteoarthritis, right  shoulder [M19.111] on 3/22/2021 and underwent KS RECONSTR TOTAL SHOULDER IMPLANT [82907] (TOTAL SHOULDER REVERSE ARTHROPLASTY)  Per Derrick Pelayo MD. Antibiotic and VTE prophylaxis were per SCIP protocols. Post-operatively the patient transferred to the post-operative floor where the patient underwent mobilization therapy that included active as well as passive ROM exercises. Opioids were titrated to achieve appropriate pain management to allow for participation in mobilization exercises. Vital signs are now stable. The incision is intact without signs or symptoms of infection. Operative extremity neurovascular status remains intact.   Appropriate education re: incision care, activity levels, medications, and follow up visits was completed and all questions were answered. The patient is now deemed stable for discharge to Home.      DIAGNOSTIC TESTS:     Admission on 03/22/2021   Component Date Value Ref Range Status   • Hemoglobin 03/22/2021 11.2* 12.0 - 15.9 g/dL Final   • Hematocrit 03/22/2021 37.3  34.0 - 46.6 % Final   • Glucose 03/23/2021 160* 65 - 99 mg/dL Final   • BUN 03/23/2021 21  8 - 23 mg/dL Final   • Creatinine 03/23/2021 1.02* 0.57 - 1.00 mg/dL Final   • Sodium 03/23/2021 132* 136 - 145 mmol/L Final   • Potassium 03/23/2021 4.8  3.5 - 5.2 mmol/L Final   • Chloride 03/23/2021 98  98 - 107 mmol/L Final   • CO2 03/23/2021 25.5  22.0 - 29.0 mmol/L Final   • Calcium 03/23/2021 8.2* 8.6 - 10.5 mg/dL Final   • eGFR Non African Amer 03/23/2021 53* >60 mL/min/1.73 Final   • BUN/Creatinine Ratio 03/23/2021 20.6  7.0 - 25.0 Final   • Anion Gap 03/23/2021 8.5  5.0 - 15.0 mmol/L Final   • WBC 03/23/2021 15.77* 3.40 - 10.80 10*3/mm3 Final   • RBC 03/23/2021 3.68* 3.77 - 5.28 10*6/mm3 Final   • Hemoglobin 03/23/2021 10.9* 12.0 - 15.9 g/dL Final   • Hematocrit 03/23/2021 35.4  34.0 - 46.6 % Final   • MCV 03/23/2021 96.2  79.0 - 97.0 fL Final   • MCH 03/23/2021 29.6  26.6 - 33.0 pg Final   • MCHC 03/23/2021 30.8*  31.5 - 35.7 g/dL Final   • RDW 03/23/2021 12.6  12.3 - 15.4 % Final   • RDW-SD 03/23/2021 44.3  37.0 - 54.0 fl Final   • MPV 03/23/2021 9.2  6.0 - 12.0 fL Final   • Platelets 03/23/2021 312  140 - 450 10*3/mm3 Final       No results found.    Discharge and Follow up Instructions:   Sling x4 weeks with range of motion of the elbow, wrist, hand initiated within the 1st week as well as gentle pendulum exercises of shoulder  Ceftin for UTI treatment, follow-up primary care for reevaluation in the next 1 to 2 weeks  Follow-up postop in Ortho office in 2 weeks  Ambulate, aspirin 81 mg  Physical therapy recommends home health physical therapy for gait training, home safety we will not start physical therapy for the right shoulder at this time.  Remain in sling the right upper extremity    Date: 3/23/2021    МАРИНА Nevarez

## 2021-03-23 NOTE — PROGRESS NOTES
Patient: Britta Armijo  Procedure(s):  TOTAL SHOULDER REVERSE ARTHROPLASTY  Anesthesia type: general with block    Patient location: [facility] Surgical Floor  Vitals:    03/22/21 2010 03/23/21 0004 03/23/21 0513 03/23/21 0706   BP: 120/57 138/67 148/68    BP Location: Left arm Left arm Left arm    Patient Position: Lying Lying Lying    Pulse: 61 55 60    Resp: 14 14 15    Temp: 97.6 °F (36.4 °C)  97.6 °F (36.4 °C)    TempSrc: Oral  Oral    SpO2: 96% 94% 92% 95%   Weight:       Height:         Level of consciousness: awake, alert and oriented    Post-anesthesia pain: adequate analgesia  Airway patency: patent  Respiratory: unassisted  Cardiovascular: stable and blood pressure at baseline  Hydration: euvolemic    Anesthetic complications: no

## 2021-03-23 NOTE — PROGRESS NOTES
POD# 1 s/p right reverse total shoulder arthroplasty     Subjective: Britta Armijo resting in bed this am, denies residual numbness or tingling right upper extremity.  Is continue with sling tolerating well.  She did work with physical therapy today who believes that she would benefit significantly from home health physical therapy/occupational therapy for balance and gait.    Objective:  Vitals:    03/22/21 2000 03/23/21 0004 03/23/21 0513 03/23/21 0706   BP:  138/67 148/68    BP Location:  Left arm Left arm    Patient Position:  Lying Lying    Pulse:  55 60    Resp:  14 15    Temp:   97.6 °F (36.4 °C)    TempSrc:   Oral    SpO2: 94% 94% 92% 95%   Weight:       Height:           Results from last 7 days   Lab Units 03/23/21  0621 03/22/21  1845   WBC 10*3/mm3 15.77*  --    HEMOGLOBIN g/dL 10.9* 11.2*   HEMATOCRIT % 35.4 37.3   PLATELETS 10*3/mm3 312  --        Results from last 7 days   Lab Units 03/23/21  0621   SODIUM mmol/L 132*   POTASSIUM mmol/L 4.8   CHLORIDE mmol/L 98   CO2 mmol/L 25.5   BUN mg/dL 21   CREATININE mg/dL 1.02*   GLUCOSE mg/dL 160*   CALCIUM mg/dL 8.2*       Exam:    Right upper extremity examined:  Sling right upper extremity  Flex/extend all digits right hand  2+ distal radial pulse  Positive sensation light touch all distributions palmar and dorsum of hand and all digits    Impression: s/p right reverse total shoulder     Plan:  1. PT/OT- a sling for 4 weeks with range of motion of the elbow, wrist, hand initiated within the 1st week as well as gentle pendulum exercises of shoulder  2. Pain control- oxycodone, tylenol,   3. DVT prophylaxis- aspirin  4. Wound care- dressing remain intact until follow-up in office  5. Disposition- home today, home health PT/OT for balance issues; ceftin 250 mg BID for UTI, follow-up with PCP in one week to reevaluate

## 2021-03-23 NOTE — THERAPY DISCHARGE NOTE
Acute Care - Physical Therapy Initial Evaluation/Discharge   [facility]     Patient Name: Britta Armijo  : 1945  MRN: [id number]  Today's Date: 3/23/2021   Onset of Illness/Injury or Date of Surgery: 21     Referring Physician: Dr. Pelayo      Admit Date: 3/22/2021    Visit Dx:    ICD-10-CM ICD-9-CM   1. Post-traumatic osteoarthritis, right shoulder  M19.111 715.21     Patient Active Problem List   Diagnosis   • Post-traumatic osteoarthritis, right shoulder   • Acute pain of right shoulder   • Atypical ductal hyperplasia of left breast     Past Medical History:   Diagnosis Date   • Arthritis of back     NECK & LOWER BACK   • Fracture, humerus    • Generalized anxiety disorder with panic attacks    • GERD (gastroesophageal reflux disease)    • Heart attack (CMS/HCC)     YEARS AGO   • Hyperlipidemia    • Hypertension    • Lumbosacral disc disease    • MRSA (methicillin resistant staph aureus) culture positive 2018    POSITIVE NASAL SWAB PRIOR TO HIP SURGERY   • Osteoporosis    • Right shoulder pain     SCHEDULED FOR TOTAL SHOULDER   • Unable to read or write     UNABLE TO READ     Past Surgical History:   Procedure Laterality Date   • APPENDECTOMY     • BREAST LUMPECTOMY Left     BENIGN   • BREAST SURGERY  2020   • CATARACT EXTRACTION, BILATERAL     • CHOLECYSTECTOMY OPEN     • EPIDURAL BLOCK     • HEMORRHOIDECTOMY      X4   • HIP SURGERY Right     Replacement   • HYSTERECTOMY     • TUMOR REMOVAL  2017    RIGHT ARM           PT Assessment (last 12 hours)      PT Evaluation and Treatment     Row Name 21 0851          Physical Therapy Time and Intention    Subjective Information  no complaints  -BP     Document Type  discharge evaluation/summary  -BP     Mode of Treatment  physical therapy  -BP     Patient Effort  good  -BP     Symptoms Noted During/After Treatment  none  -BP     Comment  No pain noted throughout evaluation   -BP     Row Name 21 0851          General Information     Patient Profile Reviewed  yes  -BP     Onset of Illness/Injury or Date of Surgery  03/22/21  -BP     Referring Physician  Dr. Pelayo  -BP     Patient Observations  alert;cooperative;agree to therapy  -BP     Patient/Family/Caregiver Comments/Observations  Patient supine in bed with HOB elevated. Patient attempting to eat breaskfast however not sucessful due to positioning. Patient agreeable to evaluation and transferring to chair in order to finish breakfast in a better position   -BP     Prior Level of Function  independent:;gait;all household mobility;community mobility;transfer;bed mobility;ADL's Patient does not drive   -BP     Equipment Currently Used at Home  none  -BP     Pertinent History of Current Functional Problem  Patient admitted s/p elective total shoulder arthroplasty due to worsening pain in R shoulder. Patient reports she lives alone and is independent with all mobility without use of an AD. Patient plans to return home with her cousin for a brief time and then return to her apartment alone. She reports she also has a neighbor to assist her as needed.   -BP     Existing Precautions/Restrictions  fall NWB R UE   -BP     Risks Reviewed  patient:;increased discomfort;LOB  -BP     Benefits Reviewed  patient:;improve function;increase independence;increase strength  -BP     Row Name 03/23/21 0836          Living Environment    Current Living Arrangements  home/apartment/condo  -BP     Home Accessibility  -- pt on second floor apartment with elevated to access   -BP     Lives With  alone  -BP     Row Name 03/23/21 8263          Cognition    Orientation Status (Cognition)  oriented to;person;place;time with cues   -BP     Follows Commands (Cognition)  WFL  -BP     Personal Safety Interventions  gait belt;nonskid shoes/slippers when out of bed  -BP     Row Name 03/23/21 2218          Pain    Additional Documentation  Pain Scale: Numbers Pre/Post-Treatment (Group)  -BP     Row Name 03/23/21 8791           Pain Scale: Numbers Pre/Post-Treatment    Pretreatment Pain Rating  0/10 - no pain  -BP     Posttreatment Pain Rating  0/10 - no pain  -BP     Row Name 03/23/21 0851          Range of Motion Comprehensive    Comment, General Range of Motion  B LE AROM WFL   -BP     Row Name 03/23/21 0851          Strength Comprehensive (MMT)    Comment, General Manual Muscle Testing (MMT) Assessment  B LE strength functional   -BP     Row Name 03/23/21 0851          Bed Mobility    Bed Mobility  supine-sit  -BP     Supine-Sit [place] (Bed Mobility)  supervision  -BP     Assistive Device (Bed Mobility)  bed rails;head of bed elevated  -BP     Comment (Bed Mobility)  Patient requires cues to perform with maximal independence.   -BP     Row Name 03/23/21 0851          Transfers    Transfers  sit-stand transfer;stand-sit transfer  -BP     Comment (Transfers)  Patient initially with posterior lean. Able to correct without assist.   -BP     Sit-Stand [place] (Transfers)  contact guard  -BP     Stand-Sit [place] (Transfers)  contact guard  -BP     Row Name 03/23/21 0851          Sit-Stand Transfer    Assistive Device (Sit-Stand Transfers)  walker, front-wheeled  -BP     Row Name 03/23/21 0851          Stand-Sit Transfer    Assistive Device (Stand-Sit Transfers)  walker, front-wheeled  -BP     Row Name 03/23/21 0851          Gait/Stairs (Locomotion)    [place] Level (Gait)  contact guard;minimum assist (75% patient effort);verbal cues  -BP     Assistive Device (Gait)  cane, straight  -BP     Distance in Feet (Gait)  112  -BP     Pattern (Gait)  swing-through  -BP     Deviations/Abnormal Patterns (Gait)  gait speed decreased;stride length decreased;base of support, wide  -BP     Comment (Gait/Stairs)  Initially performed gait without use of a device. Patient with noted unsteadiness. Performed with straight cane. Patient with slow gait speed and short step length. Performed gait again without device. Patient with improved  step length and gait speed. Initially requires min A, progresses to CGA. Recommend supervision with mobility.   -BP     Row Name 03/23/21 0851          Safety Issues, Functional Mobility    Safety Issues Affecting Function (Mobility)  insight into deficits/self-awareness;awareness of need for assistance;judgment;problem-solving  -BP     Comment, Safety Issues/Impairments (Mobility)  Patient requires significant education on NWB status of R UE as well as need for assistance with ADL's and more supervision upon return home.   -BP     Row Name             Wound 03/22/21 1212 Right anterior shoulder Incision    Wound - Properties Group Placement Date: 03/22/21  -KD Placement Time: 1212  -KD Present on Hospital Admission: N  -KD Side: Right  -KD Orientation: anterior  -KD Location: shoulder  -KD Primary Wound Type: Incision  -KD Number of Sutures Placed: 15  -KD Additional Comments: prineo, telfa, tegadecass x3  -KD    Retired Wound - Properties Group Date first assessed: 03/22/21  -KD Time first assessed: 1212  -KD Present on Hospital Admission: N  -KD Side: Right  -KD Location: shoulder  -KD Primary Wound Type: Incision  -KD Number of Sutures Placed: 15  -KD Additional Comments: prineo, telfa, tegadecass x3  -KD    Row Name 03/23/21 0851          Plan of Care Review    Plan of Care Reviewed With  patient  -BP     Outcome Summary  PT Evaluation Complete: Patient performs supine to sit transfer with CGA, sit to/from stand transfers with CGA and gait x 112 feet with CGA/min A with and without use of a straight cane. Patient with increased stability without use of an AD. Unable to sequence steps and device safely. Recommend 24/7 supervision at home initially. Patient reports she will have assistance from her cousin and neighbor. Recommend home health PT at discharge. No further inpatient skilled PT services required.   -BP     Row Name 03/23/21 0851          Positioning and Restraints    Pre-Treatment Position  in bed  -BP      Post Treatment Position  chair  -BP     In Chair  notified nsg;reclined;call light within reach;encouraged to call for assist  -BP     Row Name 03/23/21 0851          Therapy Assessment/Plan (PT)    Criteria for Skilled Interventions Met (PT)  no problems identified which require skilled intervention  -BP     Row Name 03/23/21 0851          PT Evaluation Complexity    History, PT Evaluation Complexity  3 or more personal factors and/or comorbidities  -BP     Examination of Body Systems (PT Eval Complexity)  1-2 elements  -BP     Clinical Presentation (PT Evaluation Complexity)  stable  -BP     Clinical Decision Making (PT Evaluation Complexity)  low complexity  -BP     Overall Complexity (PT Evaluation Complexity)  low complexity  -BP     Row Name 03/23/21 0851          Therapy Plan Review/Discharge Plan (PT)    Therapy Plan Review (PT)  patient;evaluation/treatment results reviewed;care plan/treatment goals reviewed;risks/benefits reviewed;participants voiced agreement with care plan;current/potential barriers reviewed  -BP       User Key  (r) = Recorded By, (t) = Taken By, (c) = Cosigned By    Initials Name Provider Type    KD Felicia Duran, RN Registered Nurse     Gordo Bullock, PT Physical Therapist          Physical Therapy Education                 Title: PT OT SLP Therapies (Resolved)     Topic: Physical Therapy (Resolved)     Point: Mobility training (Resolved)     Learning Progress Summary           Patient Acceptance, E,TB, VU,NR by  at 3/23/2021 1121                   Point: Precautions (Resolved)     Learning Progress Summary           Patient Acceptance, E,TB, VU,NR by  at 3/23/2021 1121                               User Key     Initials Effective Dates Name Provider Type Discipline     04/03/18 -  Gordo Bullock, PT Physical Therapist PT                PT Recommendation and Plan  Anticipated Discharge Disposition (PT): home with assist, home with home health  Therapy Frequency (PT):  evaluation only  Plan of Care Reviewed With: patient  Outcome Summary: PT Evaluation Complete: Patient performs supine to sit transfer with CGA, sit to/from stand transfers with CGA and gait x 112 feet with CGA/min A with and without use of a straight cane. Patient with increased stability without use of an AD. Unable to sequence steps and device safely. Recommend 24/7 supervision at home initially. Patient reports she will have assistance from her cousin and neighbor. Recommend home health PT at discharge. No further inpatient skilled PT services required.     Outcome Measures     Row Name 03/23/21 0907             How much help from another person do you currently need...    Turning from your back to your side while in flat bed without using bedrails?  3  -BP      Moving from lying on back to sitting on the side of a flat bed without bedrails?  3  -BP      Moving to and from a bed to a chair (including a wheelchair)?  3  -BP      Standing up from a chair using your arms (e.g., wheelchair, bedside chair)?  3  -BP      Climbing 3-5 steps with a railing?  3  -BP      To walk in hospital room?  3  -BP      AM-PAC 6 Clicks Score (PT)  18  -BP         Functional Assessment    Outcome Measure Options  AM-PAC 6 Clicks Basic Mobility (PT)  -BP        User Key  (r) = Recorded By, (t) = Taken By, (c) = Cosigned By    Initials Name Provider Type    Gordo Shaw PT Physical Therapist           Time Calculation:   PT Charges     Row Name 03/23/21 1125             Time Calculation    Start Time  0851  -BP      PT Received On  03/23/21  -BP        User Key  (r) = Recorded By, (t) = Taken By, (c) = Cosigned By    Initials Name Provider Type    Gordo Shaw PT Physical Therapist        Therapy Charges for Today     Code Description Service Date Service Provider Modifiers Qty    [id number] HC PT EVAL LOW COMPLEXITY 2 3/23/2021 Gordo Bullock, PT GP 1          PT G-Codes  Outcome Measure Options: AM-PAC 6 Clicks  Basic Mobility (PT)  AM-PAC 6 Clicks Score (PT): 18    PT Discharge Summary  Anticipated Discharge Disposition (PT): home with assist, home with home health  Reason for Discharge:  (Patient to be discharaged home with family)    Gordo Bullock, PT  3/23/2021

## 2021-03-24 ENCOUNTER — READMISSION MANAGEMENT (OUTPATIENT)
Dept: CALL CENTER | Facility: HOSPITAL | Age: 76
End: 2021-03-24

## 2021-03-24 NOTE — OUTREACH NOTE
Prep Survey      Responses   [facility] facility patient discharged from?  [facility]   Is LACE score < 7 ?  Yes   Emergency Room discharge w/ pulse ox?  No   Eligibility  Readm Mgmt   Discharge diagnosis  total shoulder   Does the patient have one of the following disease processes/diagnoses(primary or secondary)?  Other   Does the patient have Home health ordered?  Yes   What is the Home health agency?   [facility]   Is there a DME ordered?  No   Comments regarding appointments  listed on AVS   Medication alerts for this patient  see AVS   Prep survey completed?  Yes          Melody Sprague RN

## 2021-03-25 ENCOUNTER — TELEPHONE (OUTPATIENT)
Dept: ORTHOPEDIC SURGERY | Facility: CLINIC | Age: 76
End: 2021-03-25

## 2021-03-25 ENCOUNTER — TELEPHONE (OUTPATIENT)
Dept: SOCIAL WORK | Facility: HOSPITAL | Age: 76
End: 2021-03-25

## 2021-03-25 ENCOUNTER — READMISSION MANAGEMENT (OUTPATIENT)
Dept: CALL CENTER | Facility: HOSPITAL | Age: 76
End: 2021-03-25

## 2021-03-25 NOTE — TELEPHONE ENCOUNTER
Call received from Lisa RIVERA at [facility] stating that PT had gone out to the house to do an evaluation and found the patient to be unsafe to stay by herself since the care giver who is supposed to be staying with her is going out of town and they feel she needs higher level of care. CM did review patient's discharge notes from the hospital here on 3/23/2021 and the discharge planner's note and nothing was mentioned about care giver going out of town. Call also received from Christi Victor manager of case management and she states that she spoke with the patient's cousin Christi Romero and regarding possible STR for patient would like for CM to make a referral to [facility] and [facility]. Referrals were made to Jojo at [facility] and Stefani at [facility]. [facility] she will try to contact the physical therapist who say patient today to get the notes stating patient need higher level of care. MELISA called patient's cousin Christi Romero to discuss short term rehab and she states that the patient is having a hard time getting out of her recliner due to limited mobility in right upper extremity and balance problems once she is up with mobility. She states that prior to coming into the hospital the patient was living alone in an apartment and was independent with all her mobility. Christi also states that she will be going out of town Saturday morning and will be gone for two weeks and does not feel like the patient will be safe to be left alone at home with no one who can help. MELISA explained to Christi that I had already made two referrals to [facility] and [facility] but a decision would probably not be made today and she states that she will be with patient tonight and tomorrow and will stay with her and help. MELISA awaits return calls from facilities regarding STR. CM will follow up tomorrow and get back with patient and her cousin Christi.

## 2021-03-25 NOTE — TELEPHONE ENCOUNTER
Spoke with Cristy Victor and gave message that pt should be able to complete basic life functions on her own.  Otherwise, she may need to go stay with her daughter in [place].  Cristy will pass on the message.

## 2021-03-25 NOTE — TELEPHONE ENCOUNTER
Provider: DR GARCIA    Caller: MARISEL ARIAS    Relationship to Patient: COUSIN    Phone Number: [phone] (COUSIN'S CELL)    Reason for Call: PT'S COUSIN MARISEL ARIAS (ON [facility]) CALLED IN TO ASK FOR DR GARCIA TO SEND A NOTE TO MEDICARE SO THAT THE PT CAN GET A CHAIR WITH A REMOTE CONTROL. THE ONE SHE HAS IS CONTROLLED ON THE RIGHT SIDE, WHICH IS THE SHOULDER SHE HAD SURGERY DONE FOR ON Monday, AND CURRENTLY CAN'T GET UP AND DOWN, HAD A MINOR FALL Tuesday (DID NOT HIT SHOULDER). MEDICARE SAID THEY NEED A NOTE FROM THE DOCTOR TO DETERMINE THAT IT'S MEDICALLY NECESSARY BEFORE THEY WILL PAY FOR IT. SHE ALSO WANTS TO KNOW IF THERE IS A PARTICULAR TYPE OF THERAPY FOR HELPING PATIENT WITH BATHING, ETC. PLEASE

## 2021-03-25 NOTE — TELEPHONE ENCOUNTER
Cousin, Christi, just informed HH that she is leaving town for a week or two and there is no one to care for patient,  Wants to know if you can get her in to [place] tomorrow.   Daughter lives on [address] and has no car.

## 2021-03-25 NOTE — TELEPHONE ENCOUNTER
Discussed with Case management. Patient does not qualify for skilled nursing. She is welcome to privately pay or pay for caregiver. Case management can provide with list of private caregivers.

## 2021-03-25 NOTE — TELEPHONE ENCOUNTER
She also should be able to do basic life functions on her own in a week unless she had a baseline deficit

## 2021-03-25 NOTE — TELEPHONE ENCOUNTER
CM attempted to call physical therapy here at the hospital to see if they could review patient's chart to see if she would qualify for a six click facility with no answer. CM will follow up with them tomorrow to review chart.

## 2021-03-25 NOTE — OUTREACH NOTE
LAG < 7 Survey      Responses   [facility] patient discharged from?  [facility]   Does the patient have one of the following disease processes/diagnoses(primary or secondary)?  Other   BHLAG <7 Attempt successful?  Yes   Call start time  0915   Call end time  0931   Discharge diagnosis  total shoulder   Person spoke with today (if not patient) and relationship  Christi, cousin   Meds reviewed with patient/caregiver?  Yes   Is the patient having any side effects they believe may be caused by any medication additions or changes?  No   Does the patient have all medications ordered at discharge?  Yes   Is the patient taking all medications as directed (includes completed medication regime)?  Yes   Does the patient have a primary care provider?   Yes   Does the patient have an appointment with their PCP within 7 days of discharge?  Greater than 7 days   What is preventing the patient from scheduling follow up appointments within 7 days of discharge?  Earlier appointment not available   Nursing Interventions  Verified appointment date/time/provider   Has the patient kept scheduled appointments due by today?  N/A   What is the Home health agency?   [facility]   Has home health visited the patient within 72 hours of discharge?  Yes   Psychosocial issues?  No   Comments  wears sling, cousin states pt had fall when arriving to house after d/c, was not injured, states was due to dizziness from Percocet   Did the patient receive a copy of their discharge instructions?  Yes   Nursing interventions  Reviewed instructions with patient   What is the patient's perception of their health status since discharge?  Improving   Is the patient/caregiver able to teach back signs and symptoms related to disease process for when to call PCP?  Yes   Is the patient/caregiver able to teach back signs and symptoms related to disease process for when to call 911?  Yes   Is the patient/caregiver able to teach back the hierarchy of who to call/visit  for symptoms/problems? PCP, Specialist, Home health nurse, Urgent Care, ED, 911  Yes   Additional teach back comments  cousin states pt high fall risk, wears sling, difficult to use walker, needs help getting in recliner,  PT coming today, incision care reviewed   Graduated  Yes   Is the patient interested in additional calls from an ambulatory ?  NOTE:  applies to high risk patients requiring additional follow-up.  No          Suni Vyas RN

## 2021-03-26 ENCOUNTER — TELEPHONE (OUTPATIENT)
Dept: ORTHOPEDIC SURGERY | Facility: CLINIC | Age: 76
End: 2021-03-26

## 2021-03-26 ENCOUNTER — TELEPHONE (OUTPATIENT)
Dept: SOCIAL WORK | Facility: HOSPITAL | Age: 76
End: 2021-03-26

## 2021-03-26 NOTE — TELEPHONE ENCOUNTER
Spoke with  blanca who as discussed this with the patient and her family. Patient and patients caregiver have a friend named Any who is going to and help out. Patient stated that she doesn't need anyone to assist her while at home. Private pay was offered and patient and  cousin have declined any services.

## 2021-03-26 NOTE — TELEPHONE ENCOUNTER
Caller: MELY    Relationship: HOME HEALTH      Best call back number:     What orders are you requesting (i.e. lab or imaging): REHAB FACILITY    In what timeframe would the patient need to come in: ASAP    Where will you receive your lab/imaging services: NO PREFERENCE    Additional notes: PATIENT HAD SURGERY WITH DR GARCIA ON Monday 3/22/21-- PATIENT TOOK A FALL ON Wednesday -3/24/21-- FACILITY AND DAUGHTER HER (ALESSIA ARIAS [phone] CELL / [phone] HOME).  THEY BELIEVE THAT THE PATIENT NEEDS TO BE ADMITTED/REFERRED TO REHAB FACILITY FOR A FEW WEEKS THEY ARE CONCERNED THAT THE PATIENT MAY FALL AGAIN AND DO DAMAGE TO THE SHOULDER THAT SURGERY WAS DONE ON. PATIENT LIVES ALONE-     PLEASE ADVISE MARISEL POA-

## 2021-03-26 NOTE — TELEPHONE ENCOUNTER
"Spoke with Jojo at [facility] this morning regarding referral made yesterday and she states that she has not been able to speak with [facility] physical therapy and is still undecided if she can accept patient. She also states that patient will still need a pre cert from her insurance for approval. Referral also made to Racheal at [facility] in [place] regarding their six click program and she states that since the patient was discharged home she is unsure if they can accept under that program. Racheal also states that patient had [place] medicaid as a secondary insurance and she will check to see if she can come in under that. Racheal with return call and she states patient does not have rehab benefits under her medicaid and therefore if she is still interested in STR it would be private pay. Referral made to Yadi at [facility] in [place] and discussed patient with her and she states that she doubts [facility] would pay for rehab for shoulder surgery since there would be no skill able need. She states that patient could come as PVT pay. CM called and spoke with patient Britta and informed her that there was not skill for her insurance to pay for STR but several facilities would accept if she wanted to private pay. Britta states \"I don't want to go to no rehab and I don't need to, I'm doing fine at home\". Britta also states that her cousin Christi has a friend named Renetta who will be helping her while Christi is out of town. Britta states \"I have been getting up by myself been dressing myself since I've been home all Sayda has been doing is helping get cleaned up\". Britta states that her cousin Christi would like to speak to me and hands Christi the phone. Christi states that she no longer thinks patient needs any STR since she has been doing so well at home. Christi states that her friend Renetta will come to help patient with her bath and she has a neighbor Ian Vargas who can check on her also. CM offered to " try and arrange another home health agency to come to the home but both the patient and her cousin denies any needs at this time. Chantell PARISH for Dr. Pelayo updated that patient declines any needs at this time. CM will continue to follow if needed.

## 2021-03-26 NOTE — TELEPHONE ENCOUNTER
Qualifications for lift chair include severe osteoarthritis of the hip and the patient has not been treated.  Called to discuss this with patient's cousin therefore we will not proceed with the order.  All questions answered.

## 2021-04-06 ENCOUNTER — OFFICE VISIT (OUTPATIENT)
Dept: ORTHOPEDIC SURGERY | Facility: CLINIC | Age: 76
End: 2021-04-06

## 2021-04-06 VITALS — WEIGHT: 166 LBS | HEIGHT: 63 IN | BODY MASS INDEX: 29.41 KG/M2

## 2021-04-06 DIAGNOSIS — Z96.611 S/P REVERSE TOTAL SHOULDER ARTHROPLASTY, RIGHT: Primary | ICD-10-CM

## 2021-04-06 PROCEDURE — 99024 POSTOP FOLLOW-UP VISIT: CPT | Performed by: NURSE PRACTITIONER

## 2021-04-06 NOTE — PROGRESS NOTES
Name: Britta Armijo  MRN: [id number]  Diagnosis: s/p right reverse TSA     Arthroplasty    Interval History: Britta Armijo returns for her 2 week postoperative visit.  She is doing well. Pain is controlled with pain medication, currently taking tylenol only and is  improving. She denies any wound problem, fevers, or chills.  Denies any calf tenderness bilateral lower extremities, aspirin 81 mg for DVT prophylaxis.    Physical Examination: Her right shoulder  was examined out of her dressing. Incision is clean, dry and intact.  There is no active drainage, erythema, or evidence of infection. Distal  motor and sensory exam is grossly intact. Hand is well perfused.   strength 4+ out of 5  2+ distal radial pulse  Positive sensation light touch all distributions of the right upper extremity occluding the dorsum palmar aspect of the hand and all digits    Assessment: follow-up status post reverse total shoulder arthroplasty, right    Britta Armijo is recovering from surgery as expected.  We will send to therapy for work on ROM per protocol. Discontinue sling at 4 weeks postop. She is to follow up in clinic in 4 weeks with x-rays with Dr. Pelayo. All questions answered.   МАРИНА Nevarez

## 2021-04-07 ENCOUNTER — TELEPHONE (OUTPATIENT)
Dept: ORTHOPEDIC SURGERY | Facility: CLINIC | Age: 76
End: 2021-04-07

## 2021-04-14 ENCOUNTER — TELEPHONE (OUTPATIENT)
Dept: ORTHOPEDIC SURGERY | Facility: CLINIC | Age: 76
End: 2021-04-14

## 2021-04-14 NOTE — TELEPHONE ENCOUNTER
Pt states she had not been contacted yet regarding her PT.  She is wondering when it is going to start?

## 2021-04-15 DIAGNOSIS — Z96.611 S/P REVERSE TOTAL SHOULDER ARTHROPLASTY, RIGHT: Primary | ICD-10-CM

## 2021-04-19 ENCOUNTER — HOSPITAL ENCOUNTER (OUTPATIENT)
Dept: PHYSICAL THERAPY | Facility: HOSPITAL | Age: 76
Setting detail: THERAPIES SERIES
Discharge: HOME | End: 2021-04-19

## 2021-04-19 DIAGNOSIS — Z96.611 S/P REVERSE TOTAL SHOULDER ARTHROPLASTY, RIGHT: Primary | ICD-10-CM

## 2021-04-19 PROCEDURE — 97161 PT EVAL LOW COMPLEX 20 MIN: CPT | Performed by: PHYSICAL THERAPIST

## 2021-04-19 PROCEDURE — 97140 MANUAL THERAPY 1/> REGIONS: CPT | Performed by: PHYSICAL THERAPIST

## 2021-04-19 NOTE — THERAPY EVALUATION
Outpatient Physical Therapy Ortho Initial Evaluation   Magui Drew     Patient Name: Britta Armijo  : 1945  MRN: [id number]  Today's Date: 2021      Visit Date: 2021    Patient Active Problem List   Diagnosis   • Post-traumatic osteoarthritis, right shoulder   • Acute pain of right shoulder   • Atypical ductal hyperplasia of left breast   • S/P reverse total shoulder arthroplasty, right        Past Medical History:   Diagnosis Date   • Arthritis of back     NECK & LOWER BACK   • Fracture, humerus    • Generalized anxiety disorder with panic attacks    • GERD (gastroesophageal reflux disease)    • Heart attack (CMS/HCC)     YEARS AGO   • Hyperlipidemia    • Hypertension    • Lumbosacral disc disease    • MRSA (methicillin resistant staph aureus) culture positive 2018    POSITIVE NASAL SWAB PRIOR TO HIP SURGERY   • Osteoporosis    • Right shoulder pain     SCHEDULED FOR TOTAL SHOULDER   • Unable to read or write     UNABLE TO READ        Past Surgical History:   Procedure Laterality Date   • APPENDECTOMY     • BREAST LUMPECTOMY Left     BENIGN   • BREAST SURGERY  2020   • CATARACT EXTRACTION, BILATERAL     • CHOLECYSTECTOMY OPEN     • EPIDURAL BLOCK     • HEMORRHOIDECTOMY      X4   • HIP SURGERY Right     Replacement   • HYSTERECTOMY     • TOTAL SHOULDER ARTHROPLASTY W/ DISTAL CLAVICLE EXCISION Right 3/22/2021    Procedure: TOTAL SHOULDER REVERSE ARTHROPLASTY;  Surgeon: Derrick Pelayo MD;  Location: [facility];  Service: Orthopedics;  Laterality: Right;  TOTAL SHOULDER REVERSE ARTHROPLASTY   • TUMOR REMOVAL  2017    RIGHT ARM        Visit Dx:     ICD-10-CM ICD-9-CM   1. S/P reverse total shoulder arthroplasty, right  Z96.611 V43.61         Patient History     Row Name 21 1200             History    Chief Complaint  Pain;Difficulty with daily activities  -SP      Type of Pain  Shoulder pain;Upper Extremity / Arm  -SP      Date Current Problem(s) Began  21  -SP       Brief Description of Current Complaint  Patient reports that she fractured her right shoulder approximately 3 years ago.  Patient reports that she continued to have pain and immobility and is now s/p right total shoulder performed 3/22/21.   Patient reports that surgery went well.  She states that her shoulder pain is decreasing.  She takes Tylenol regularly.  Patient states that she has swelling and bruising.  Patient has been using ice but her swelling continues to be prominent.  -SP      Previous treatment for THIS PROBLEM  Medication;Surgery  -SP      Surgery Date:  03/22/21  -SP      Patient/Caregiver Goals  Relieve pain;Improve mobility  -SP      Patient's Rating of General Health  Good  -SP      Hand Dominance  right-handed  -SP      Occupation/sports/leisure activities  disabled; depends on caregiver for a ride to therapy.  enjoys walking  -SP      How has patient tried to help current problem?  ice, tylenol  -SP      What clinical tests have you had for this problem?  X-ray  -SP         Pain     Pain Location  Shoulder  -SP      Pain at Present  5  -SP      Pain at Best  4  -SP      Pain at Worst  10  -SP      Pain Frequency  Constant/continuous  -SP      Pain Description  Aching  -SP      What Performance Factors Make the Current Problem(s) WORSE?  pain at night trying to sleep or move in bed, has to go to recliner; movement  -SP      What Performance Factors Make the Current Problem(s) BETTER?  tylenol, ice  -SP      Tolerance Time- Standing  unlimited  -SP      Tolerance Time- Sitting  unlimited  -SP      Tolerance Time- Walking  unlimited  -SP      Is your sleep disturbed?  Yes  -SP      What position do you sleep in?  -- typically has to sleep in recliner  -SP      Difficulties with ADL's?  uses shower chair; able to dress with increased time required  -SP         Fall Risk Assessment    Any falls in the past year:  No  -SP         Daily Activities    Primary Language  English  -SP      Are you able to  read  No  -SP      How does patient learn best?  Listening;Reading;Demonstration;Pictures/Video  -SP      Teaching needs identified  Home Exercise Program;Management of Condition  -SP      Patient is concerned about/has problems with  Repetitive movements of the hand, arm, shoulder;Reaching over head  -SP      Does patient have problems with the following?  None  -SP      Barriers to learning  Cognitive;Other (comment) 6th grade education level per caregiver   -SP      Pt Participated in POC and Goals  Yes  -SP         Safety    Are you being hurt, hit, or frightened by anyone at home or in your life?  No  -SP      Are you being neglected by a caregiver  No  -SP        User Key  (r) = Recorded By, (t) = Taken By, (c) = Cosigned By    Initials Name Provider Type    Chantell Coley, PT Physical Therapist          PT Ortho     Row Name 04/19/21 1400       Posture/Observations    Rounded Shoulders  Left:;Moderate  -SP    Observations  Edema;Ecchymosis/bruising;Incision healing  -SP    Posture/Observations Comments  Pt with significant ecchymosis at anterior right arm and along incision.  Incision with good closure. Significant edema right upper arm to elbow. Patient reports insidious onset of these symptoms 1.5day ago with no DAVID.  Patient denies reaching behind/IR, or falls that would have resulted in new onset eccchymosis. Chantell PRIEST present to assess pt's shoulder.   -SP       Shoulder Girdle Palpation    Subacromial Space  Right:;Tender;Guarded/taut;Swollen  -SP    AC Joint  Right:;Tender  -SP    Long Head of Biceps  Right:;Tender;Swollen  -SP    Short Head of Biceps  Bilateral:;Tender;Swollen  -SP    Deltoid  Right:;Tender;Swollen  -SP    Pect Minor  Right:;Tender;Guarded/taut  -SP    Upper Trap  Right:;Tender  -SP       General ROM    GENERAL ROM COMMENTS  cervical AROM limited in all planes.  Left UE AROM WFL  -SP       Right Upper Ext    Rt Shoulder Abduction AROM  35 degrees  -SP    Rt  Shoulder Abduction PROM  90 degrees  -SP    Rt Shoulder Flexion AROM  20 degrees  -SP    Rt Shoulder Flexion PROM  80 degrees  -SP    Rt Shoulder External Rotation PROM  5 degrees  -SP    Rt Shoulder Internal Rotation PROM  68 degrees  -SP    Rt Elbow Extension/Flexion AROM  7 to 137 degrees  -SP    Rt Upper Extremity Comments   complains of pain with all ROM  -SP       MMT (Manual Muscle Testing)    General MMT Comments  strength not assessed  -SP       Sensation    Sensation WNL?  WFL  -SP       Upper Extremity Flexibility    Upper Trapezius  Moderately limited;Bilateral:  -SP    Pect Minor  Bilateral:;Moderately limited  -SP    Biceps  Right:;Moderately limited  -SP       Girth    Girth Measured?  -- mid humerus girth 88.5 cm  -SP       Transfers    Bed-Chair [place] (Transfers)  independent  -SP    Chair-Bed [place] (Transfers)  independent  -SP    Sit-Stand [place] (Transfers)  independent  -SP    Stand-Sit [place] (Transfers)  independent  -SP    Comment (Transfers)  Patient need min assist to transfers supine to sit   -SP       Gait/Stairs (Locomotion)    [place] Level (Gait)  independent  -SP      User Key  (r) = Recorded By, (t) = Taken By, (c) = Cosigned By    Initials Name Provider Type    Chantell Coley, PT Physical Therapist                      Therapy Education  Given: HEP  Program: New  How Provided: Verbal, Written  Provided to: Patient  Level of Understanding: Verbalized, Demonstrated     PT OP Goals     Row Name 04/19/21 1500          PT Short Term Goals    STG Date to Achieve  05/04/21  -SP     STG 1  Patient demonstrates right shoulder flexion PROM to >90 degrees  -SP     STG 2  Patient with decreased edema right upper arm by 1 cm   -SP     STG 3  Patient able to verbalize post-operative shoulder precautions  -SP     STG 4  Patient able to perform light waist level ADLs without difficulty or increased pain  -SP        Long Term Goals    LTG Date to Achieve   05/19/21  -SP     LTG 1  Patient demonstrates PROM right shoulder flexion/scaption to >120 degrees  -SP     LTG 2  Patient demonstrates right shoulder AROM flexion to >100 degrees  -SP     LTG 3  Patient reports improved ability to complete light home and community ADLs with pain level 1-2/10  -SP     LTG 4  Patient independent with HEP  -SP        Time Calculation    PT Goal Re-Cert Due Date  05/19/21  -SP       User Key  (r) = Recorded By, (t) = Taken By, (c) = Cosigned By    Initials Name Provider Type    Chantell Coley, PT Physical Therapist          PT Assessment/Plan     Row Name 04/19/21 1549 04/19/21 1544       PT Assessment    Functional Limitations  --  Limitation in home management;Limitations in community activities;Performance in self-care ADL;Performance in leisure activities  -SP    Assessment Comments  --  Patient with remote history of right UE fracture approximately 3 years ago that failed to improve.  Patient is now s/p right total shoulder 3-22-21, and with recent insidious onset of significant ecchymosis and edema occurring over the last couple of days with no reported DAVID.  Chantell PRIEST present to assess new recent changes in shoulder and advised to continue with ROM activity.  Patient presents with pain, edema, ecchymosis, decreased right UE ROM, weakness and impaired functional ability to perform home and community ADLs.  -SP    Please refer to paper survey for additional self-reported information  Yes  -SP  --    Rehab Potential  Good  -SP  --    Patient/caregiver participated in establishment of treatment plan and goals  Yes  -SP  --    Patient would benefit from skilled therapy intervention  Yes  -SP  --       PT Plan    PT Frequency  2x/week  -SP  --    Predicted Duration of Therapy Intervention (PT)  6-8 weeks  -SP  --    Planned CPT's?  PT EVAL MOD COMPLELITY: 02086;PT THER PROC EA 15 MIN: 08713;PT MANUAL THERAPY EA 15 MIN: 73684;PT HOT OR COLD PACK TREAT MCARE;PT  ELECTRICAL STIM UNATTEND:   -SP  --      User Key  (r) = Recorded By, (t) = Taken By, (c) = Cosigned By    Initials Name Provider Type    Chantell Coley, TABITHA Physical Therapist          Modalities     Row Name 04/19/21 1400             Ice    Ice Applied  Yes with IFC  -SP      Location  right shoulder and upper arm  -SP      Rx Minutes  15 mins  -SP      Ice S/P Rx  Yes  -SP        User Key  (r) = Recorded By, (t) = Taken By, (c) = Cosigned By    Initials Name Provider Type    Chantell Coley, TABITHA Physical Therapist        OP Exercises     Row Name 04/19/21 1500             Exercise 1    Exercise Name 1  supine AAROM shoulder flexion clasped hands  -SP         Exercise 2    Exercise Name 2  table slides (flexion)  -SP         Exercise 3    Exercise Name 3  wall walk (with left UE support)  -SP        User Key  (r) = Recorded By, (t) = Taken By, (c) = Cosigned By    Initials Name Provider Type    Chantell Coley, TABITHA Physical Therapist           Manual Rx (last 36 hours)      Manual Treatments     Row Name 04/19/21 1500             Manual Rx 1    Manual Rx 1 Location  right shoulder  -SP      Manual Rx 1 Type  PROM into all planes   -SP      Manual Rx 1 Duration  10 min  -SP        User Key  (r) = Recorded By, (t) = Taken By, (c) = Cosigned By    Initials Name Provider Type    Chantell Coley, PT Physical Therapist                      Outcome Measure Options: Quick DASH  Quick DASH  Open a tight or new jar.: Unable  Do heavy household chores (e.g., wash walls, wash floors): Moderate Difficulty  Carry a shopping bag or briefcase: Moderate Difficulty  Wash your back: Severe Difficulty  Use a knife to cut food: Severe Difficulty  Recreational activities in which you take some force or impact through your arm, should or hand (e.g. golf, hammering, tennis, etc.): Unable  During the past week, to what extent has your arm, shoulder, or hand problem interfered with your  normal social activites with family, friends, neighbors or groups?: Moderately  During the past week, were you limited in your work or other regular daily activities as a result of your arm, shoulder or hand problem?: Moderately Limited  Arm, Shoulder, or hand pain: Moderate  Tingling (pins and needles) in your arm, shoulder, or hand: None  During the past week, how much difficulty have you had sleeping because of the pain in your arm, shoulder or hand?: Moderate Difficiculty  Number of Questions Answered: 11  Quick DASH Score: 59.09         Time Calculation:     Start Time: 1227  Stop Time: 1345  Time Calculation (min): 78 min     Therapy Charges for Today     Code Description Service Date Service Provider Modifiers Qty    59976112489 HC PT MANUAL THERAPY EA 15 MIN 4/19/2021 Chantell Denton, PT GP 1    96752694284 HC PT EVAL LOW COMPLEXITY 4 4/19/2021 Chantell Denton, PT GP 1          PT G-Codes  Outcome Measure Options: Quick DASH  Quick DASH Score: 59.09         Chantell Denton, PT  4/19/2021

## 2021-04-22 ENCOUNTER — HOSPITAL ENCOUNTER (OUTPATIENT)
Dept: PHYSICAL THERAPY | Facility: HOSPITAL | Age: 76
Setting detail: THERAPIES SERIES
Discharge: HOME | End: 2021-04-22

## 2021-04-22 DIAGNOSIS — Z96.611 S/P REVERSE TOTAL SHOULDER ARTHROPLASTY, RIGHT: Primary | ICD-10-CM

## 2021-04-22 PROCEDURE — 97140 MANUAL THERAPY 1/> REGIONS: CPT | Performed by: PHYSICAL THERAPIST

## 2021-04-22 PROCEDURE — G0283 ELEC STIM OTHER THAN WOUND: HCPCS | Performed by: PHYSICAL THERAPIST

## 2021-04-22 RX ORDER — MELOXICAM 15 MG/1
TABLET ORAL
Qty: 30 TABLET | Refills: 0 | Status: SHIPPED | OUTPATIENT
Start: 2021-04-22 | End: 2021-05-13

## 2021-04-22 NOTE — THERAPY TREATMENT NOTE
Outpatient Physical Therapy Ortho Treatment Note   Magui Drew     Patient Name: Britta Armijo  : 1945  MRN: [id number]  Today's Date: 2021      Visit Date: 2021    Visit Dx:    ICD-10-CM ICD-9-CM   1. S/P reverse total shoulder arthroplasty, right  Z96.611 V43.61       Patient Active Problem List   Diagnosis   • Post-traumatic osteoarthritis, right shoulder   • Acute pain of right shoulder   • Atypical ductal hyperplasia of left breast   • S/P reverse total shoulder arthroplasty, right        Past Medical History:   Diagnosis Date   • Arthritis of back     NECK & LOWER BACK   • Fracture, humerus    • Generalized anxiety disorder with panic attacks    • GERD (gastroesophageal reflux disease)    • Heart attack (CMS/HCC)     YEARS AGO   • Hyperlipidemia    • Hypertension    • Lumbosacral disc disease    • MRSA (methicillin resistant staph aureus) culture positive 2018    POSITIVE NASAL SWAB PRIOR TO HIP SURGERY   • Osteoporosis    • Right shoulder pain     SCHEDULED FOR TOTAL SHOULDER   • Unable to read or write     UNABLE TO READ        Past Surgical History:   Procedure Laterality Date   • APPENDECTOMY     • BREAST LUMPECTOMY Left     BENIGN   • BREAST SURGERY  2020   • CATARACT EXTRACTION, BILATERAL     • CHOLECYSTECTOMY OPEN     • EPIDURAL BLOCK     • HEMORRHOIDECTOMY      X4   • HIP SURGERY Right     Replacement   • HYSTERECTOMY     • TOTAL SHOULDER ARTHROPLASTY W/ DISTAL CLAVICLE EXCISION Right 3/22/2021    Procedure: TOTAL SHOULDER REVERSE ARTHROPLASTY;  Surgeon: Derrick Pelayo MD;  [facility];  Service: Orthopedics;  Laterality: Right;  TOTAL SHOULDER REVERSE ARTHROPLASTY   • TUMOR REMOVAL  2017    RIGHT ARM        PT Ortho     Row Name 21 1200       Subjective Comments    Subjective Comments  Pt states her shoulder feels better, but it ispopping all the time.  -      User Key  (r) = Recorded By, (t) = Taken By, (c) = Cosigned By    Initials Name  Provider Type    GC Rios Banda, PT Physical Therapist                      PT Assessment/Plan     Row Name 04/22/21 1200          PT Assessment    Assessment Comments  Pt still has considerable pain with all PROM and does have significant popping at both the elbow and shoulder with PROM.  -        PT Plan    PT Plan Comments  Will see pt again next week. If she is not progressing, then will refer back to ortho.  -GC       User Key  (r) = Recorded By, (t) = Taken By, (c) = Cosigned By    Initials Name Provider Type    GC Rios Banda, PT Physical Therapist          Modalities     Row Name 04/22/21 1200             Ice    Ice Applied  Yes  -GC      Location  right shoulder and upper arm with IFC with pt supine   -GC      Ice S/P Rx  Yes  -GC         ELECTRICAL STIMULATION    Attended/Unattended  Unattended  -      Stimulation Type  IFC  -GC      Location/Electrode Placement/Other  right shoulder with ice with pt supine  -GC      62232 PT E-Stim Unattended Minutes  15  -GC         Other Treatment Provided    PT Ice Rx Minutes  15 mins  -GC        User Key  (r) = Recorded By, (t) = Taken By, (c) = Cosigned By    Initials Name Provider Type     Rios Banda, PT Physical Therapist        OP Exercises     Row Name 04/22/21 1200             Subjective Comments    Subjective Comments  Pt states her shoulder feels better, but it ispopping all the time.  -GC         Total Minutes    78380 - PT Manual Therapy Minutes  15  -GC        User Key  (r) = Recorded By, (t) = Taken By, (c) = Cosigned By    Initials Name Provider Type     Rios Banda, PT Physical Therapist                      Manual Rx (last 36 hours)      Manual Treatments     Row Name 04/22/21 1200             Total Minutes    92069 - PT Manual Therapy Minutes  15  -GC         Manual Rx 1    Manual Rx 1 Location  right shoulder  -      Manual Rx 1 Type  PROM into all planes   -GC      Manual Rx 1 Duration  15 min  -GC        User Key  (r) =  Recorded By, (t) = Taken By, (c) = Cosigned By    Initials Name Provider Type     Rios Banda, PT Physical Therapist                             Time Calculation:   Start Time: 1200  Stop Time: 1248  Time Calculation (min): 48 min  Time Calculation- PT  Start Time: 1200  Stop Time: 1248  Time Calculation (min): 48 min  Timed Charges  17281 - PT Manual Therapy Minutes: 15  Untimed Charges  07234 PT E-Stim Unattended Minutes: 15  Total Minutes  Timed Charges Total Minutes: 15  Untimed Charges Total Minutes: 15   Total Minutes: 30  Therapy Charges for Today     Code Description Service Date Service Provider Modifiers Qty    62585938029 HC PT MANUAL THERAPY EA 15 MIN 4/22/2021 Rios Banda, PT GP 1    70517523787 HC PT ELECTRICAL STIM UNATTENDED 4/22/2021 Rios Banda, PT  1                    Rios Banda, PT  4/22/2021

## 2021-04-26 ENCOUNTER — APPOINTMENT (OUTPATIENT)
Dept: PHYSICAL THERAPY | Facility: HOSPITAL | Age: 76
End: 2021-04-26

## 2021-04-27 ENCOUNTER — HOSPITAL ENCOUNTER (OUTPATIENT)
Dept: PHYSICAL THERAPY | Facility: HOSPITAL | Age: 76
Setting detail: THERAPIES SERIES
Discharge: HOME | End: 2021-04-27

## 2021-04-27 DIAGNOSIS — Z96.611 S/P REVERSE TOTAL SHOULDER ARTHROPLASTY, RIGHT: Primary | ICD-10-CM

## 2021-04-27 PROCEDURE — G0283 ELEC STIM OTHER THAN WOUND: HCPCS | Performed by: PHYSICAL THERAPIST

## 2021-04-27 PROCEDURE — 97140 MANUAL THERAPY 1/> REGIONS: CPT | Performed by: PHYSICAL THERAPIST

## 2021-04-27 NOTE — THERAPY TREATMENT NOTE
Outpatient Physical Therapy Ortho Treatment Note   Magui Drew     Patient Name: Britta Armijo  : 1945  MRN: [id number]  Today's Date: 2021      Visit Date: 2021    Visit Dx:    ICD-10-CM ICD-9-CM   1. S/P reverse total shoulder arthroplasty, right  Z96.611 V43.61       Patient Active Problem List   Diagnosis   • Post-traumatic osteoarthritis, right shoulder   • Acute pain of right shoulder   • Atypical ductal hyperplasia of left breast   • S/P reverse total shoulder arthroplasty, right        Past Medical History:   Diagnosis Date   • Arthritis of back     NECK & LOWER BACK   • Fracture, humerus    • Generalized anxiety disorder with panic attacks    • GERD (gastroesophageal reflux disease)    • Heart attack (CMS/HCC)     YEARS AGO   • Hyperlipidemia    • Hypertension    • Lumbosacral disc disease    • MRSA (methicillin resistant staph aureus) culture positive 2018    POSITIVE NASAL SWAB PRIOR TO HIP SURGERY   • Osteoporosis    • Right shoulder pain     SCHEDULED FOR TOTAL SHOULDER   • Unable to read or write     UNABLE TO READ        Past Surgical History:   Procedure Laterality Date   • APPENDECTOMY     • BREAST LUMPECTOMY Left     BENIGN   • BREAST SURGERY  2020   • CATARACT EXTRACTION, BILATERAL     • CHOLECYSTECTOMY OPEN     • EPIDURAL BLOCK     • HEMORRHOIDECTOMY      X4   • HIP SURGERY Right     Replacement   • HYSTERECTOMY     • TOTAL SHOULDER ARTHROPLASTY W/ DISTAL CLAVICLE EXCISION Right 3/22/2021    Procedure: TOTAL SHOULDER REVERSE ARTHROPLASTY;  Surgeon: Derrick Pelayo MD;  Location: [facility];  Service: Orthopedics;  Laterality: Right;  TOTAL SHOULDER REVERSE ARTHROPLASTY   • TUMOR REMOVAL      RIGHT ARM        PT Ortho     Row Name 21 0800       Subjective Comments    Subjective Comments  Patient reports that she has been doing exercises at home.  Her shoulder continues to pop and crack.  -SP      User Key  (r) = Recorded By, (t) = Taken By, (c) =  Cosigned By    Initials Name Provider Type    Chantell Coley, PT Physical Therapist                      PT Assessment/Plan     Row Name 04/27/21 0948 04/27/21 0837       PT Assessment    Assessment Comments  Patient with improved tolerance for PROM.  She continues to have significant crepitus at right shoulder and elbow.  -SP  Patient tolerates slow progression of ther-ex.  Better tolerance for PROM observed, she continues to have significant crepitus in shoulder and elbow with PROM.  -SP       PT Plan    PT Plan Comments  Continue to progress as tolerable  -SP  Continue to progress as tolerable  -SP      User Key  (r) = Recorded By, (t) = Taken By, (c) = Cosigned By    Initials Name Provider Type    Chantell Coley, PT Physical Therapist          Modalities     Row Name 04/27/21 0800             Ice    Ice Applied  Yes with IFC  -SP      Location  right shoulder and upper arm with IFC with pt supine   -SP      PT Ice Rx Minutes  15  -SP      Ice S/P Rx  Yes  -SP         ELECTRICAL STIMULATION    Attended/Unattended  Unattended  -SP      Stimulation Type  IFC  -SP      Location/Electrode Placement/Other  right shoulder with ice with pt supine  -SP      23392 PT E-Stim Unattended Minutes  15  -SP         Other Treatment Provided    PT Ice Rx Minutes  15 mins  -SP        User Key  (r) = Recorded By, (t) = Taken By, (c) = Cosigned By    Initials Name Provider Type    Chantell Coley, PT Physical Therapist        OP Exercises     Row Name 04/27/21 0952 04/27/21 0800          Subjective Comments    Subjective Comments  --  Patient reports that she has been doing exercises at home.  Her shoulder continues to pop and crack.  -SP        Total Minutes    86170 - PT Therapeutic Exercise Minutes  10  -SP  --     09049 - PT Manual Therapy Minutes  15  -SP  --        Exercise 1    Exercise Name 1  --  supine AAROM shoulder flexion clasped hands  -SP     Cueing 1  --  Verbal;Demo  -SP      Reps 1  --  10  -SP        Exercise 2    Exercise Name 2  --  ball slides on table (flexion and scaption  -SP     Cueing 2  --  Verbal;Demo  -SP     Reps 2  --  10 x each  -SP        Exercise 3    Exercise Name 3  --  finger ladder  -SP     Cueing 3  --  Verbal;Demo  -SP     Reps 3  --  4  -SP        Exercise 4    Exercise Name 4  --  pulleys: flexion  -SP     Cueing 4  --  Verbal;Demo  -SP     Reps 4  --  4 min  -SP       User Key  (r) = Recorded By, (t) = Taken By, (c) = Cosigned By    Initials Name Provider Type    Chantell Coley, PT Physical Therapist                      Manual Rx (last 36 hours)      Manual Treatments     Row Name 04/27/21 0952 04/27/21 0800          Total Minutes    76506 - PT Manual Therapy Minutes  15  -SP  --        Manual Rx 1    Manual Rx 1 Location  --  right shoulder  -SP     Manual Rx 1 Type  --  PROM into all planes   -SP     Manual Rx 1 Duration  --  15 min  -SP       User Key  (r) = Recorded By, (t) = Taken By, (c) = Cosigned By    Initials Name Provider Type    Chantell Coley, PT Physical Therapist              Therapy Education  Given: HEP  Program: Reinforced  How Provided: Verbal  Provided to: Patient  Level of Understanding: Verbalized, Demonstrated              Time Calculation:   Start Time: 0755  Stop Time: 0900  Time Calculation (min): 65 min  Time Calculation- PT  Start Time: 0755  Stop Time: 0900  Time Calculation (min): 65 min  Timed Charges  34704 - PT Therapeutic Exercise Minutes: 10  14711 - PT Manual Therapy Minutes: 15  Untimed Charges  79591 PT E-Stim Unattended Minutes: 15  PT Ice Rx Minutes: 15  Total Minutes  Timed Charges Total Minutes: 25  Untimed Charges Total Minutes: 15   Total Minutes: 40  Therapy Charges for Today     Code Description Service Date Service Provider Modifiers Qty    49802474716 HC PT MANUAL THERAPY EA 15 MIN 4/27/2021 Chantell Denton, PT GP 1    05145435737 HC PT ELECTRICAL STIM UNATTENDED 4/27/2021  Bertin, Chantell Trent, PT  1                    Chantell Denton, PT  4/27/2021

## 2021-04-29 ENCOUNTER — APPOINTMENT (OUTPATIENT)
Dept: PHYSICAL THERAPY | Facility: HOSPITAL | Age: 76
End: 2021-04-29

## 2021-05-04 ENCOUNTER — DOCUMENTATION (OUTPATIENT)
Dept: PHYSICAL THERAPY | Facility: HOSPITAL | Age: 76
End: 2021-05-04

## 2021-05-04 ENCOUNTER — APPOINTMENT (OUTPATIENT)
Dept: PHYSICAL THERAPY | Facility: HOSPITAL | Age: 76
End: 2021-05-04

## 2021-05-07 ENCOUNTER — HOSPITAL ENCOUNTER (OUTPATIENT)
Dept: PHYSICAL THERAPY | Facility: HOSPITAL | Age: 76
Setting detail: THERAPIES SERIES
Discharge: HOME | End: 2021-05-07

## 2021-05-07 DIAGNOSIS — Z96.611 S/P REVERSE TOTAL SHOULDER ARTHROPLASTY, RIGHT: Primary | ICD-10-CM

## 2021-05-07 PROCEDURE — 97140 MANUAL THERAPY 1/> REGIONS: CPT

## 2021-05-07 PROCEDURE — 97110 THERAPEUTIC EXERCISES: CPT

## 2021-05-10 ENCOUNTER — HOSPITAL ENCOUNTER (OUTPATIENT)
Dept: PHYSICAL THERAPY | Facility: HOSPITAL | Age: 76
Setting detail: THERAPIES SERIES
Discharge: HOME | End: 2021-05-10

## 2021-05-10 DIAGNOSIS — Z96.611 S/P REVERSE TOTAL SHOULDER ARTHROPLASTY, RIGHT: Primary | ICD-10-CM

## 2021-05-10 PROCEDURE — 97110 THERAPEUTIC EXERCISES: CPT

## 2021-05-10 PROCEDURE — 97140 MANUAL THERAPY 1/> REGIONS: CPT

## 2021-05-11 ENCOUNTER — OFFICE VISIT (OUTPATIENT)
Dept: ORTHOPEDIC SURGERY | Facility: CLINIC | Age: 76
End: 2021-05-11

## 2021-05-11 VITALS — BODY MASS INDEX: 29.41 KG/M2 | WEIGHT: 166 LBS | HEIGHT: 63 IN

## 2021-05-11 DIAGNOSIS — Z96.611 INSTABILITY OF REVERSE TOTAL ARTHROPLASTY OF RIGHT SHOULDER (HCC): ICD-10-CM

## 2021-05-11 DIAGNOSIS — T84.028A INSTABILITY OF REVERSE TOTAL ARTHROPLASTY OF RIGHT SHOULDER (HCC): ICD-10-CM

## 2021-05-11 DIAGNOSIS — Z96.611 S/P REVERSE TOTAL SHOULDER ARTHROPLASTY, RIGHT: Primary | ICD-10-CM

## 2021-05-11 PROCEDURE — 99024 POSTOP FOLLOW-UP VISIT: CPT | Performed by: ORTHOPAEDIC SURGERY

## 2021-05-11 PROCEDURE — 73030 X-RAY EXAM OF SHOULDER: CPT | Performed by: ORTHOPAEDIC SURGERY

## 2021-05-11 RX ORDER — ACETAMINOPHEN 325 MG/1
1000 TABLET ORAL ONCE
Status: CANCELLED | OUTPATIENT
Start: 2021-05-11 | End: 2021-05-11

## 2021-05-11 RX ORDER — MELOXICAM 7.5 MG/1
15 TABLET ORAL ONCE
Status: CANCELLED | OUTPATIENT
Start: 2021-05-11 | End: 2021-05-11

## 2021-05-11 RX ORDER — PREGABALIN 150 MG/1
150 CAPSULE ORAL ONCE
Status: CANCELLED | OUTPATIENT
Start: 2021-05-11 | End: 2021-05-11

## 2021-05-11 NOTE — THERAPY TREATMENT NOTE
Outpatient Physical Therapy Ortho Treatment Note   Magui Drew     Patient Name: Britta Armijo  : 1945  MRN: [id number]  Today's Date: 2021      Visit Date: 05/10/2021    Visit Dx:    ICD-10-CM ICD-9-CM   1. S/P reverse total shoulder arthroplasty, right  Z96.611 V43.61       Patient Active Problem List   Diagnosis   • Post-traumatic osteoarthritis, right shoulder   • Acute pain of right shoulder   • Atypical ductal hyperplasia of left breast   • S/P reverse total shoulder arthroplasty, right        Past Medical History:   Diagnosis Date   • Arthritis of back     NECK & LOWER BACK   • Fracture, humerus    • Generalized anxiety disorder with panic attacks    • GERD (gastroesophageal reflux disease)    • Heart attack (CMS/HCC)     YEARS AGO   • Hyperlipidemia    • Hypertension    • Lumbosacral disc disease    • MRSA (methicillin resistant staph aureus) culture positive 2018    POSITIVE NASAL SWAB PRIOR TO HIP SURGERY   • Osteoporosis    • Right shoulder pain     SCHEDULED FOR TOTAL SHOULDER   • Unable to read or write     UNABLE TO READ        Past Surgical History:   Procedure Laterality Date   • APPENDECTOMY     • BREAST LUMPECTOMY Left     BENIGN   • BREAST SURGERY  2020   • CATARACT EXTRACTION, BILATERAL     • CHOLECYSTECTOMY OPEN     • EPIDURAL BLOCK     • HEMORRHOIDECTOMY      X4   • HIP SURGERY Right     Replacement   • HYSTERECTOMY     • TOTAL SHOULDER ARTHROPLASTY W/ DISTAL CLAVICLE EXCISION Right 3/22/2021    Procedure: TOTAL SHOULDER REVERSE ARTHROPLASTY;  Surgeon: Derrick Pelayo MD;  Location: [facility];  Service: Orthopedics;  Laterality: Right;  TOTAL SHOULDER REVERSE ARTHROPLASTY   • TUMOR REMOVAL  2017    RIGHT ARM                        PT Assessment/Plan     Row Name 05/10/21 1150          PT Assessment    Assessment Comments  Pt with good passive IR/ER, but extremely limited flexion/scaption. Limited AAROM and unable to progress protocol due to limitations. Pt to  f/u with MD.   -KM        PT Plan    PT Plan Comments  Continue POC per MD  -KM       User Key  (r) = Recorded By, (t) = Taken By, (c) = Cosigned By    Initials Name Provider Type    Kailey Barrientos PTA Physical Therapy Assistant          Modalities     Row Name 05/10/21 1150             Moist Heat    MH Applied  Yes  -KM      Location  (R) shoulder - pt supine with bolster under knees  -KM      PT Moist Heat Minutes  10  -KM      MH Prior to Rx  Yes  -KM         Ice    Ice Applied  Yes  -KM      Location  (R) shoulder-  pt supine  -KM      Ice S/P Rx  Yes  -KM         ELECTRICAL STIMULATION    Attended/Unattended  Unattended  -KM      Stimulation Type  IFC  -KM      Location/Electrode Placement/Other  right shoulder with ice with pt supine  -KM         Other Treatment Provided    PT Ice Rx Minutes  15 mins  -KM        User Key  (r) = Recorded By, (t) = Taken By, (c) = Cosigned By    Initials Name Provider Type    Kailey Barrientos PTA Physical Therapy Assistant        OP Exercises     Row Name 05/10/21 1150             Subjective Comments    Subjective Comments  Pt states she felt her shoulder pop multiple times over the weekend with increased pain anterior aspect.   -KM         Exercise 1    Exercise Name 1  supine AAROM shoulder flexion clasped hands  -KM      Cueing 1  Verbal;Demo  -KM      Reps 1  20  -KM         Exercise 2    Exercise Name 2  ball slides on table (flexion and scaption  -KM      Cueing 2  Verbal;Demo  -KM      Reps 2  10 x each  -KM         Exercise 3    Exercise Name 3  finger ladder  -KM      Cueing 3  Verbal;Demo  -KM      Reps 3  5  -KM         Exercise 4    Exercise Name 4  pulleys: flexion  -KM      Cueing 4  Verbal;Demo  -KM      Reps 4  5 min  -KM        User Key  (r) = Recorded By, (t) = Taken By, (c) = Cosigned By    Initials Name Provider Type    Kailey Barrientos PTA Physical Therapy Assistant                                          Time Calculation:   Start Time: 1150  Stop  Time: 1240  Time Calculation (min): 50 min  Time Calculation- PT  Start Time: 1150  Stop Time: 1240  Time Calculation (min): 50 min  Untimed Charges  PT Moist Heat Minutes: 10  Total Minutes  Untimed Charges Total Minutes: 10   Total Minutes: 10  Therapy Charges for Today     Code Description Service Date Service Provider Modifiers Qty    24066491532 HC PT MANUAL THERAPY EA 15 MIN 5/10/2021 Kailey Mckenzie PTA GP 1    05301368528 HC PT THER PROC EA 15 MIN 5/10/2021 Kailey Mckenzie PTA GP 1                    Kailey Mckenzie PTA  5/11/2021

## 2021-05-11 NOTE — PROGRESS NOTES
Name: Britta Armijo  MRN: [id number]  Diagnosis: s/p right reverse TSA DOS 3/22/2021     Arthroplasty      Interval History: Britta Armijo returns for her 6 week postoperative visit. Two days ago she was sitting in the recliner and had an audible popping episode. Afterwards, she has had constant shoulder pain. She is doing well. Pain is controlled with pain medication and is improving. She denies any wound problem, fevers, or chills. After starting her oxycodone, she had several falling episodes and stopped the medication.      Physical Examination: Her right shoulder was examined out of her dressing.   Incision well healed.  There is no active drainage, erythema, or evidence of infection.  Distal motor and sensory exam is grossly intact. Hand is well perfused.  Tolerates minimal motion without pain localized to glenohumeral joint and prominence noted anterior joint  Moderate soft tissue swelling      Radiographic review: Radiographs of the right shoulder 2 views, AP Grashey, scapular Y, ordered and reviewed by me today, indication s/p shoulder arthroplasty, shows     Assessment/Plan:  Given reverse TSA joint dislocation, we will proceed with closed reduction versus open reduction as soon as possible.   I reviewed with with the patient the specific indication of a reverse shoulder arthroplasty manipulation versus open reduction and all associated procedures.  I reviewed details of the procedure as well as risks, benefits, and alternatives with the risks including but not limited to neurovascular damage, bleeding, infection, periprosthetic fracture, recurrent instability, loosening of implant, loss of motion, weakness, stiffness, DVT, pulmonary embolus, death, stroke, complex regional pain syndrome, myocardial infarction, need for additional procedures.  Patient understood all these had all questions answered today.  Patient understood all this had all questions answered.  No guarantees were given in regards to  results of the surgery.  Provided a basic sling to be used until surgical date.  Patient denies past medical history of blood clots, cardiac issues, or diabetes mellitus.     Britta Armijo and family were in agreement with plan and had all questions answered.    SCRIBE ATTESTATION:  I, Balbir Connelly, attest that all medical record entries for this patient were documented by me acting as a medical scribe for Derrick Pelayo MD.      PROVIDER ATTESTATION:  IDerrick MD, personally performed the services described in this documentation. All medical record entries made by the scribe were at my direction and in my presence. I have reviewed the chart and discharge instructions and agree that the record reflects my personal performance and is accurate and complete.  Derrick Pelayo MD.    Electronically signed: Derrick Pelayo MD 5/11/2021 17:58 EDT

## 2021-05-12 PROBLEM — T84.028A INSTABILITY OF REVERSE TOTAL ARTHROPLASTY OF RIGHT SHOULDER (HCC): Status: ACTIVE | Noted: 2021-05-12

## 2021-05-12 PROBLEM — Z96.611 INSTABILITY OF REVERSE TOTAL ARTHROPLASTY OF RIGHT SHOULDER: Status: ACTIVE | Noted: 2021-05-12

## 2021-05-13 ENCOUNTER — PRE-ADMISSION TESTING (OUTPATIENT)
Dept: PREADMISSION TESTING | Facility: HOSPITAL | Age: 76
End: 2021-05-13

## 2021-05-13 ENCOUNTER — ANESTHESIA EVENT (OUTPATIENT)
Dept: PERIOP | Facility: HOSPITAL | Age: 76
End: 2021-05-13

## 2021-05-13 VITALS
DIASTOLIC BLOOD PRESSURE: 86 MMHG | RESPIRATION RATE: 16 BRPM | OXYGEN SATURATION: 97 % | WEIGHT: 165.1 LBS | SYSTOLIC BLOOD PRESSURE: 172 MMHG | BODY MASS INDEX: 29.25 KG/M2 | HEIGHT: 63 IN | HEART RATE: 72 BPM

## 2021-05-13 DIAGNOSIS — Z96.611 S/P REVERSE TOTAL SHOULDER ARTHROPLASTY, RIGHT: ICD-10-CM

## 2021-05-13 DIAGNOSIS — Z96.611 INSTABILITY OF REVERSE TOTAL ARTHROPLASTY OF RIGHT SHOULDER (HCC): ICD-10-CM

## 2021-05-13 DIAGNOSIS — T84.028A INSTABILITY OF REVERSE TOTAL ARTHROPLASTY OF RIGHT SHOULDER (HCC): ICD-10-CM

## 2021-05-13 LAB
ANION GAP SERPL CALCULATED.3IONS-SCNC: 10.1 MMOL/L (ref 5–15)
BASOPHILS # BLD AUTO: 0.04 10*3/MM3 (ref 0–0.2)
BASOPHILS NFR BLD AUTO: 0.6 % (ref 0–1.5)
BUN SERPL-MCNC: 19 MG/DL (ref 8–23)
BUN/CREAT SERPL: 17.1 (ref 7–25)
CALCIUM SPEC-SCNC: 9.3 MG/DL (ref 8.6–10.5)
CHLORIDE SERPL-SCNC: 99 MMOL/L (ref 98–107)
CO2 SERPL-SCNC: 27.9 MMOL/L (ref 22–29)
CREAT SERPL-MCNC: 1.11 MG/DL (ref 0.57–1)
DEPRECATED RDW RBC AUTO: 49.7 FL (ref 37–54)
EOSINOPHIL # BLD AUTO: 0.28 10*3/MM3 (ref 0–0.4)
EOSINOPHIL NFR BLD AUTO: 4.1 % (ref 0.3–6.2)
ERYTHROCYTE [DISTWIDTH] IN BLOOD BY AUTOMATED COUNT: 14.1 % (ref 12.3–15.4)
GFR SERPL CREATININE-BSD FRML MDRD: 48 ML/MIN/1.73
GLUCOSE SERPL-MCNC: 112 MG/DL (ref 65–99)
HCT VFR BLD AUTO: 37.4 % (ref 34–46.6)
HGB BLD-MCNC: 11.5 G/DL (ref 12–15.9)
IMM GRANULOCYTES # BLD AUTO: 0.01 10*3/MM3 (ref 0–0.05)
IMM GRANULOCYTES NFR BLD AUTO: 0.1 % (ref 0–0.5)
INR PPP: 1 (ref 0.9–1.1)
LYMPHOCYTES # BLD AUTO: 2.22 10*3/MM3 (ref 0.7–3.1)
LYMPHOCYTES NFR BLD AUTO: 32.1 % (ref 19.6–45.3)
MCH RBC QN AUTO: 29.5 PG (ref 26.6–33)
MCHC RBC AUTO-ENTMCNC: 30.7 G/DL (ref 31.5–35.7)
MCV RBC AUTO: 95.9 FL (ref 79–97)
MONOCYTES # BLD AUTO: 0.56 10*3/MM3 (ref 0.1–0.9)
MONOCYTES NFR BLD AUTO: 8.1 % (ref 5–12)
NEUTROPHILS NFR BLD AUTO: 3.8 10*3/MM3 (ref 1.7–7)
NEUTROPHILS NFR BLD AUTO: 55 % (ref 42.7–76)
NRBC BLD AUTO-RTO: 0 /100 WBC (ref 0–0.2)
PLATELET # BLD AUTO: 330 10*3/MM3 (ref 140–450)
PMV BLD AUTO: 9.4 FL (ref 6–12)
POTASSIUM SERPL-SCNC: 3.9 MMOL/L (ref 3.5–5.2)
PROTHROMBIN TIME: 12.9 SECONDS (ref 12.1–15)
RBC # BLD AUTO: 3.9 10*6/MM3 (ref 3.77–5.28)
SARS-COV-2 RNA PNL SPEC NAA+PROBE: NOT DETECTED
SODIUM SERPL-SCNC: 137 MMOL/L (ref 136–145)
WBC # BLD AUTO: 6.91 10*3/MM3 (ref 3.4–10.8)

## 2021-05-13 PROCEDURE — 80048 BASIC METABOLIC PNL TOTAL CA: CPT | Performed by: ORTHOPAEDIC SURGERY

## 2021-05-13 PROCEDURE — C9803 HOPD COVID-19 SPEC COLLECT: HCPCS

## 2021-05-13 PROCEDURE — 87635 SARS-COV-2 COVID-19 AMP PRB: CPT | Performed by: ORTHOPAEDIC SURGERY

## 2021-05-13 PROCEDURE — 36415 COLL VENOUS BLD VENIPUNCTURE: CPT

## 2021-05-13 PROCEDURE — 85025 COMPLETE CBC W/AUTO DIFF WBC: CPT | Performed by: ORTHOPAEDIC SURGERY

## 2021-05-13 PROCEDURE — 85610 PROTHROMBIN TIME: CPT | Performed by: ORTHOPAEDIC SURGERY

## 2021-05-13 RX ORDER — MELOXICAM 15 MG/1
15 TABLET ORAL DAILY
COMMUNITY
End: 2021-06-16

## 2021-05-13 RX ORDER — SENNOSIDES 8.6 MG
650 CAPSULE ORAL EVERY 8 HOURS PRN
COMMUNITY
End: 2023-03-20

## 2021-05-14 ENCOUNTER — ANESTHESIA (OUTPATIENT)
Dept: PERIOP | Facility: HOSPITAL | Age: 76
End: 2021-05-14

## 2021-05-14 ENCOUNTER — APPOINTMENT (OUTPATIENT)
Dept: GENERAL RADIOLOGY | Facility: HOSPITAL | Age: 76
End: 2021-05-14

## 2021-05-14 ENCOUNTER — HOSPITAL ENCOUNTER (OUTPATIENT)
Facility: HOSPITAL | Age: 76
Setting detail: HOSPITAL OUTPATIENT SURGERY
Discharge: HOME | End: 2021-05-14
Attending: ORTHOPAEDIC SURGERY | Admitting: ORTHOPAEDIC SURGERY

## 2021-05-14 VITALS
SYSTOLIC BLOOD PRESSURE: 178 MMHG | TEMPERATURE: 98.7 F | DIASTOLIC BLOOD PRESSURE: 78 MMHG | OXYGEN SATURATION: 95 % | RESPIRATION RATE: 14 BRPM | WEIGHT: 160.8 LBS | HEART RATE: 69 BPM | BODY MASS INDEX: 28.48 KG/M2

## 2021-05-14 DIAGNOSIS — T84.028A INSTABILITY OF REVERSE TOTAL ARTHROPLASTY OF RIGHT SHOULDER (HCC): ICD-10-CM

## 2021-05-14 DIAGNOSIS — Z96.611 S/P REVERSE TOTAL SHOULDER ARTHROPLASTY, RIGHT: ICD-10-CM

## 2021-05-14 DIAGNOSIS — Z96.611 INSTABILITY OF REVERSE TOTAL ARTHROPLASTY OF RIGHT SHOULDER (HCC): ICD-10-CM

## 2021-05-14 PROCEDURE — 25010000002 PROPOFOL 10 MG/ML EMULSION: Performed by: NURSE ANESTHETIST, CERTIFIED REGISTERED

## 2021-05-14 PROCEDURE — 25010000002 DEXAMETHASONE PER 1 MG: Performed by: NURSE ANESTHETIST, CERTIFIED REGISTERED

## 2021-05-14 PROCEDURE — 25010000002 FENTANYL CITRATE (PF) 100 MCG/2ML SOLUTION: Performed by: NURSE ANESTHETIST, CERTIFIED REGISTERED

## 2021-05-14 PROCEDURE — 73020 X-RAY EXAM OF SHOULDER: CPT

## 2021-05-14 PROCEDURE — 23655 CLTX SHO DSLC W/MNPJ W/ANES: CPT | Performed by: ORTHOPAEDIC SURGERY

## 2021-05-14 PROCEDURE — 25010000002 ONDANSETRON PER 1 MG: Performed by: NURSE ANESTHETIST, CERTIFIED REGISTERED

## 2021-05-14 RX ORDER — ONDANSETRON 2 MG/ML
4 INJECTION INTRAMUSCULAR; INTRAVENOUS ONCE AS NEEDED
Status: DISCONTINUED | OUTPATIENT
Start: 2021-05-14 | End: 2021-05-14 | Stop reason: HOSPADM

## 2021-05-14 RX ORDER — MELOXICAM 7.5 MG/1
15 TABLET ORAL ONCE
Status: COMPLETED | OUTPATIENT
Start: 2021-05-14 | End: 2021-05-14

## 2021-05-14 RX ORDER — DEXAMETHASONE SODIUM PHOSPHATE 4 MG/ML
4 INJECTION, SOLUTION INTRA-ARTICULAR; INTRALESIONAL; INTRAMUSCULAR; INTRAVENOUS; SOFT TISSUE ONCE AS NEEDED
Status: COMPLETED | OUTPATIENT
Start: 2021-05-14 | End: 2021-05-14

## 2021-05-14 RX ORDER — SODIUM CHLORIDE, SODIUM LACTATE, POTASSIUM CHLORIDE, CALCIUM CHLORIDE 600; 310; 30; 20 MG/100ML; MG/100ML; MG/100ML; MG/100ML
100 INJECTION, SOLUTION INTRAVENOUS CONTINUOUS
Status: DISCONTINUED | OUTPATIENT
Start: 2021-05-14 | End: 2021-05-14 | Stop reason: HOSPADM

## 2021-05-14 RX ORDER — OXYCODONE HYDROCHLORIDE AND ACETAMINOPHEN 5; 325 MG/1; MG/1
1 TABLET ORAL EVERY 4 HOURS PRN
Qty: 30 TABLET | Refills: 0 | Status: SHIPPED | OUTPATIENT
Start: 2021-05-14 | End: 2021-06-16

## 2021-05-14 RX ORDER — ONDANSETRON 2 MG/ML
4 INJECTION INTRAMUSCULAR; INTRAVENOUS ONCE AS NEEDED
Status: COMPLETED | OUTPATIENT
Start: 2021-05-14 | End: 2021-05-14

## 2021-05-14 RX ORDER — LIDOCAINE HYDROCHLORIDE 10 MG/ML
0.5 INJECTION, SOLUTION EPIDURAL; INFILTRATION; INTRACAUDAL; PERINEURAL ONCE AS NEEDED
Status: DISCONTINUED | OUTPATIENT
Start: 2021-05-14 | End: 2021-05-14 | Stop reason: HOSPADM

## 2021-05-14 RX ORDER — PREGABALIN 75 MG/1
150 CAPSULE ORAL ONCE
Status: COMPLETED | OUTPATIENT
Start: 2021-05-14 | End: 2021-05-14

## 2021-05-14 RX ORDER — LIDOCAINE HYDROCHLORIDE 20 MG/ML
INJECTION, SOLUTION INFILTRATION; PERINEURAL AS NEEDED
Status: DISCONTINUED | OUTPATIENT
Start: 2021-05-14 | End: 2021-05-14 | Stop reason: SURG

## 2021-05-14 RX ORDER — SODIUM CHLORIDE, SODIUM LACTATE, POTASSIUM CHLORIDE, CALCIUM CHLORIDE 600; 310; 30; 20 MG/100ML; MG/100ML; MG/100ML; MG/100ML
9 INJECTION, SOLUTION INTRAVENOUS CONTINUOUS
Status: DISCONTINUED | OUTPATIENT
Start: 2021-05-14 | End: 2021-05-14 | Stop reason: HOSPADM

## 2021-05-14 RX ORDER — ACETAMINOPHEN 500 MG
1000 TABLET ORAL ONCE
Status: COMPLETED | OUTPATIENT
Start: 2021-05-14 | End: 2021-05-14

## 2021-05-14 RX ORDER — FENTANYL CITRATE 50 UG/ML
25 INJECTION, SOLUTION INTRAMUSCULAR; INTRAVENOUS
Status: DISCONTINUED | OUTPATIENT
Start: 2021-05-14 | End: 2021-05-14 | Stop reason: HOSPADM

## 2021-05-14 RX ORDER — SODIUM CHLORIDE 9 MG/ML
40 INJECTION, SOLUTION INTRAVENOUS AS NEEDED
Status: DISCONTINUED | OUTPATIENT
Start: 2021-05-14 | End: 2021-05-14 | Stop reason: HOSPADM

## 2021-05-14 RX ORDER — OXYCODONE HYDROCHLORIDE AND ACETAMINOPHEN 5; 325 MG/1; MG/1
1 TABLET ORAL ONCE AS NEEDED
Status: DISCONTINUED | OUTPATIENT
Start: 2021-05-14 | End: 2021-05-14 | Stop reason: HOSPADM

## 2021-05-14 RX ORDER — FAMOTIDINE 10 MG/ML
20 INJECTION, SOLUTION INTRAVENOUS
Status: COMPLETED | OUTPATIENT
Start: 2021-05-14 | End: 2021-05-14

## 2021-05-14 RX ORDER — PROPOFOL 10 MG/ML
VIAL (ML) INTRAVENOUS AS NEEDED
Status: DISCONTINUED | OUTPATIENT
Start: 2021-05-14 | End: 2021-05-14 | Stop reason: SURG

## 2021-05-14 RX ORDER — SODIUM CHLORIDE 0.9 % (FLUSH) 0.9 %
10 SYRINGE (ML) INJECTION EVERY 12 HOURS SCHEDULED
Status: DISCONTINUED | OUTPATIENT
Start: 2021-05-14 | End: 2021-05-14 | Stop reason: HOSPADM

## 2021-05-14 RX ORDER — SODIUM CHLORIDE 0.9 % (FLUSH) 0.9 %
10 SYRINGE (ML) INJECTION AS NEEDED
Status: DISCONTINUED | OUTPATIENT
Start: 2021-05-14 | End: 2021-05-14 | Stop reason: HOSPADM

## 2021-05-14 RX ADMIN — FAMOTIDINE 20 MG: 10 INJECTION INTRAVENOUS at 12:31

## 2021-05-14 RX ADMIN — ONDANSETRON 4 MG: 2 INJECTION INTRAMUSCULAR; INTRAVENOUS at 12:31

## 2021-05-14 RX ADMIN — FENTANYL CITRATE 25 MCG: 50 INJECTION INTRAMUSCULAR; INTRAVENOUS at 15:37

## 2021-05-14 RX ADMIN — FENTANYL CITRATE 25 MCG: 50 INJECTION INTRAMUSCULAR; INTRAVENOUS at 16:14

## 2021-05-14 RX ADMIN — PROPOFOL 100 MG: 10 INJECTION, EMULSION INTRAVENOUS at 14:50

## 2021-05-14 RX ADMIN — FENTANYL CITRATE 25 MCG: 50 INJECTION INTRAMUSCULAR; INTRAVENOUS at 15:21

## 2021-05-14 RX ADMIN — PREGABALIN 150 MG: 75 CAPSULE ORAL at 12:19

## 2021-05-14 RX ADMIN — LIDOCAINE HYDROCHLORIDE 100 MG: 20 INJECTION, SOLUTION INFILTRATION; PERINEURAL at 14:50

## 2021-05-14 RX ADMIN — DEXAMETHASONE SODIUM PHOSPHATE 4 MG: 4 INJECTION, SOLUTION INTRAMUSCULAR; INTRAVENOUS at 12:31

## 2021-05-14 RX ADMIN — SODIUM CHLORIDE, POTASSIUM CHLORIDE, SODIUM LACTATE AND CALCIUM CHLORIDE 9 ML/HR: 600; 310; 30; 20 INJECTION, SOLUTION INTRAVENOUS at 12:19

## 2021-05-14 RX ADMIN — PROPOFOL 50 MG: 10 INJECTION, EMULSION INTRAVENOUS at 15:00

## 2021-05-14 RX ADMIN — FENTANYL CITRATE 25 MCG: 50 INJECTION INTRAMUSCULAR; INTRAVENOUS at 14:05

## 2021-05-14 RX ADMIN — MELOXICAM 15 MG: 7.5 TABLET ORAL at 12:19

## 2021-05-14 RX ADMIN — FENTANYL CITRATE 25 MCG: 50 INJECTION INTRAMUSCULAR; INTRAVENOUS at 13:32

## 2021-05-14 RX ADMIN — OXYCODONE HYDROCHLORIDE AND ACETAMINOPHEN 1 TABLET: 5; 325 TABLET ORAL at 16:30

## 2021-05-14 RX ADMIN — ACETAMINOPHEN 1000 MG: 500 TABLET, FILM COATED ORAL at 12:19

## 2021-05-14 NOTE — ANESTHESIA PREPROCEDURE EVALUATION
Anesthesia Evaluation     Patient summary reviewed and Nursing notes reviewed   no history of anesthetic complications:  NPO Solid Status: > 8 hours  NPO Liquid Status: > 8 hours           Airway   Mallampati: II  TM distance: >3 FB  Neck ROM: full  No difficulty expected  Dental - normal exam   (+) partials    Pulmonary - negative pulmonary ROS and normal exam   Cardiovascular - normal exam  Exercise tolerance: good (4-7 METS)    ECG reviewed    (+) hypertension, past MI  >12 months, hyperlipidemia,       Neuro/Psych  (+) psychiatric history Anxiety,     GI/Hepatic/Renal/Endo    (+)  GERD well controlled,      Musculoskeletal     Abdominal  - normal exam   Substance History      OB/GYN negative ob/gyn ROS         Other   arthritis,                      Anesthesia Plan    ASA 2     MAC and general   total IV anesthesia(MAC progressing to GA if necessary for reduction)  intravenous induction     Anesthetic plan, all risks, benefits, and alternatives have been provided, discussed and informed consent has been obtained with: patient.  Use of blood products discussed with patient  Consented to blood products.

## 2021-05-14 NOTE — ANESTHESIA POSTPROCEDURE EVALUATION
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 (CMS/HCC)      (S/P reverse total shoulder arthroplasty, right [Z96.611])      (Instability of reverse total arthroplasty of right shoulder (CMS/HCC) [T84.028A, Z96.611])    Surgeons: Derrick Pelayo MD Provider: Jose Manuel Bashir CRNA    Anesthesia Type: MAC ASA Status: 2          Anesthesia Type: MAC    Vitals  Vitals Value Taken Time   /86 05/14/21 1530   Temp 98.7 °F (37.1 °C) 05/14/21 1530   Pulse 75 05/14/21 1530   Resp 14 05/14/21 1530   SpO2 97 % 05/14/21 1530           Post Anesthesia Care and Evaluation    Patient location during evaluation: bedside  Patient participation: complete - patient participated  Level of consciousness: awake and alert  Pain score: 0  Pain management: adequate  Airway patency: patent  Anesthetic complications: No anesthetic complications  PONV Status: none  Cardiovascular status: acceptable  Respiratory status: acceptable  Hydration status: acceptable

## 2021-05-26 ENCOUNTER — OFFICE VISIT (OUTPATIENT)
Dept: ORTHOPEDIC SURGERY | Facility: CLINIC | Age: 76
End: 2021-05-26

## 2021-05-26 VITALS — BODY MASS INDEX: 28.48 KG/M2 | HEIGHT: 63 IN | WEIGHT: 160.72 LBS

## 2021-05-26 DIAGNOSIS — Z96.611 INSTABILITY OF REVERSE TOTAL ARTHROPLASTY OF RIGHT SHOULDER (HCC): ICD-10-CM

## 2021-05-26 DIAGNOSIS — T84.028A INSTABILITY OF REVERSE TOTAL ARTHROPLASTY OF RIGHT SHOULDER (HCC): ICD-10-CM

## 2021-05-26 DIAGNOSIS — Z96.611 S/P REVERSE TOTAL SHOULDER ARTHROPLASTY, RIGHT: Primary | ICD-10-CM

## 2021-05-26 PROCEDURE — 99024 POSTOP FOLLOW-UP VISIT: CPT | Performed by: ORTHOPAEDIC SURGERY

## 2021-05-26 PROCEDURE — 73030 X-RAY EXAM OF SHOULDER: CPT | Performed by: ORTHOPAEDIC SURGERY

## 2021-05-26 NOTE — PROGRESS NOTES
CC: F/u s/p closed reduction right reverse shoulder arthroplasty DOS 5/14/2021  S/p right reverse TSA 3/22/21      Interval History: Patient returns to clinic stating pain is doing fairly well, has been using sling as instructed, denies any numbness or tingling over right arm. No fevers, chills, or sweats, and no drainage from incisions noted. She has not started into physical therapy.    Exam:   Right shoulder- incisions healing well   Tolerates FF- 90,   ER- 20 with notable apprehension   Positive sensation all distributions right hand and proximal lateral aspect arm, positive deltoid firing   Cap refill < 3 seconds, radial pulse 2+   Positive deltoid firing   Flex/extend fingers/thumb/wrist with 4+/5 strength, positive thumbs up, okay sign, cross finger adduction and abduction against resistance     Right Shoulder X-Ray  Indication: Status post shoulder arthroplasty  AP, scapular Y, and axillary lateral views    Findings:  Shoulder arthroplasty components appear to be stable in position, well-seated, overall acceptable alignment.  No evidence of osteolysis, loosening, periprosthetic fracture-mild evidence of reactive bone formation in the inferior axillary space.    Compared to prior postoperative x-rays from office    Impression: s/p closed reduction right reverse shoulder arthroplasty     Plan:  1. Begin PT 2 times per week at [facility], [place], operated by [facility] for work on ROM and progressing into strengthening per protocol  2. Continue sling  3. F/u in 3 weeks to evaluate motion and progress with PT  4. All questions answered      No orders of the defined types were placed in this encounter.      Orders Placed This Encounter   Procedures   • XR Shoulder 2+ View Right     Order Specific Question:   Reason for Exam:     Answer:   PAIN     Order Specific Question:   Release to patient     Answer:   Immediate   • Ambulatory Referral to Physical Therapy Evaluate and treat, Ortho     Referral Priority:   Routine     Referral Type:   Physical  Therapy     Referral Reason:   Specialty Services Required     Requested Specialty:   Physical Therapy       SCRIBE ATTESTATION:  I, Balbir Connelly, attest that all medical record entries for this patient were documented by me acting as a medical scribe for Derrick Pelayo MD.    PROVIDER ATTESTATION:  IDerrick MD, personally performed the services described in this documentation. All medical record entries made by the scribe were at my direction and in my presence. I have reviewed the chart and discharge instructions and agree that the record reflects my personal performance and is accurate and complete.  Derrick Pelayo MD.    Electronically signed: Derrick Pelayo MD 5/26/2021 14:05 EDT

## 2021-06-02 ENCOUNTER — HOSPITAL ENCOUNTER (OUTPATIENT)
Dept: PHYSICAL THERAPY | Facility: HOSPITAL | Age: 76
Setting detail: THERAPIES SERIES
Discharge: HOME | End: 2021-06-02

## 2021-06-02 DIAGNOSIS — Z96.611 S/P REVERSE TOTAL SHOULDER ARTHROPLASTY, RIGHT: Primary | ICD-10-CM

## 2021-06-02 PROCEDURE — 97162 PT EVAL MOD COMPLEX 30 MIN: CPT | Performed by: PHYSICAL THERAPIST

## 2021-06-02 NOTE — THERAPY EVALUATION
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)        Past Medical History:   Diagnosis Date   • Arthritis of back     NECK & LOWER BACK   • COVID-19 vaccine series completed    • Fracture, humerus    • Generalized anxiety disorder with panic attacks    • GERD (gastroesophageal reflux disease)    • Heart attack (CMS/HCC)     YEARS AGO   • Hyperlipidemia    • Hypertension    • Lumbosacral disc disease    • MRSA (methicillin resistant staph aureus) culture positive 2018    POSITIVE NASAL SWAB PRIOR TO HIP SURGERY   • Osteoporosis    • Right shoulder pain     SCHEDULED FOR TOTAL SHOULDER   • Unable to read or write     UNABLE TO READ        Past Surgical History:   Procedure Laterality Date   • APPENDECTOMY     • BREAST LUMPECTOMY Left     BENIGN   • BREAST SURGERY  2020   • CATARACT EXTRACTION, BILATERAL     • CHOLECYSTECTOMY OPEN     • EPIDURAL BLOCK     • HEMORRHOIDECTOMY      X4   • HIP SURGERY Right     Replacement   • HYSTERECTOMY     • SHOULDER MANIPULATION Right 2021    Procedure: REVERSE TOTAL SHOULDER MANIPULATION;  Surgeon: Derrick Pelayo MD;  Location:  [facility];  Service: Orthopedics;  Laterality: Right;   • TOTAL SHOULDER ARTHROPLASTY W/ DISTAL CLAVICLE EXCISION Right 3/22/2021    Procedure: TOTAL SHOULDER REVERSE ARTHROPLASTY;  Surgeon: Derrick Pelayo MD;  Location:  [facility];  Service: Orthopedics;  Laterality: Right;  TOTAL SHOULDER REVERSE ARTHROPLASTY   • TUMOR REMOVAL  2017    RIGHT ARM        Visit Dx:     ICD-10-CM ICD-9-CM   1. S/P reverse  total shoulder arthroplasty, right  Z96.611 V43.61         Patient History     Row Name 06/02/21 1100             History    Chief Complaint  Pain;Difficulty with daily activities  -SP      Type of Pain  Shoulder pain;Upper Extremity / Arm  -SP      Date Current Problem(s) Began  03/22/21  -SP      Brief Description of Current Complaint  Patient reports that she fractured her right shoulder approximately 3 years ago.  Patient reports that she continued to have pain and immobility and is now s/p right total shoulder performed 3/22/21.   Patient reports that surgery went well.  Patient started PT and was found to have increased right shoulder pain, edema and ecchymosis.  Patient was found to have right TSA dislocation of indeterminate date on 5-11-21 during follow up with Dr. Pelayo.  Patient underwent closed reduction of right reverse shoulder arthroplasty on 5-14-21.   Patient reports that she has had increased pain in right shoulder over the last couple of days.  She reports that she did not have any specific incident, injury or fall.  She states she has not reached behind herself and has consistently been wearing her sling as ordered by Dr. Pelayo.  Patient states that a couple of days ago, she tried to sleep in her bed and her bed is elevated and she has to crawl up on bed by weight bearing on UEs.  She has since been sleeping in a recliner.  -SP      Previous treatment for THIS PROBLEM  Medication;Surgery  -SP      Surgery Date:  03/22/21 closed reduction (R) TSA 5-14-21  -SP      Patient/Caregiver Goals  Relieve pain;Improve mobility  -SP      Patient/Caregiver Goals Comment  be able to use arm  -SP      Patient's Rating of General Health  Good  -SP      Hand Dominance  right-handed  -SP      Occupation/sports/leisure activities  disabled; depends on caregiver for a ride to therapy.  enjoys walking  -SP      How has patient tried to help current problem?  ice, tylenol  -SP      What clinical tests have you had  for this problem?  X-ray  -SP         Pain     Pain at Present  9  -SP      Pain at Worst  10  -SP      Pain Frequency  Constant/continuous  -SP      Pain Description  Aching  -SP      What Performance Factors Make the Current Problem(s) WORSE?  patient reports increased pain over the last couple of days even at rest.  Patient reports that she has not been able to sleep in her bed.   -SP      What Performance Factors Make the Current Problem(s) BETTER?  tylenol, ice  -SP      Tolerance Time- Standing  unlimited   -SP      Tolerance Time- Sitting  unlimited   -SP      Tolerance Time- Walking  unlimited  -SP      Is your sleep disturbed?  Yes  -SP      What position do you sleep in?  Other (comment) currently sleeping in recliner  -SP      Difficulties at work?  disabled  -SP      Difficulties with ADL's?  uses shower chair, able to dress and don/doff sling with modification and increased time required.   -SP         Fall Risk Assessment    Any falls in the past year:  No  -SP         Daily Activities    Primary Language  English  -SP      Are you able to read  No  -SP      How does patient learn best?  Listening;Reading;Demonstration;Pictures/Video  -SP      Teaching needs identified  Home Exercise Program;Management of Condition  -SP      Patient is concerned about/has problems with  Repetitive movements of the hand, arm, shoulder;Reaching over head  -SP      Does patient have problems with the following?  None  -SP      Barriers to learning  Cognitive;Other (comment) 6th grade education level per caregiver   -SP      Pt Participated in POC and Goals  Yes  -SP         Safety    Are you being hurt, hit, or frightened by anyone at home or in your life?  No  -SP      Are you being neglected by a caregiver  No  -SP        User Key  (r) = Recorded By, (t) = Taken By, (c) = Cosigned By    Initials Name Provider Type    Chantell Coley, PT Physical Therapist          PT Ortho     Row Name 06/02/21 1100        Posture/Observations    Rounded Shoulders  Left:;Moderate  -SP    Observations  Edema;Incision healing;Muscle atrophy  -SP    Posture/Observations Comments  Patient with significant edema right mid and upper arm; incision healing well with no signs or symptoms of infection.  No ecchymosis noted  -SP       Shoulder Girdle Palpation    Subacromial Space  Right:;Tender;Guarded/taut;Swollen  -SP    AC Joint  Right:;Tender  -SP    Long Head of Biceps  Right:;Tender;Swollen  -SP    Short Head of Biceps  Bilateral:;Tender;Swollen  -SP    Deltoid  Right:;Tender;Swollen  -SP    Pect Minor  Right:;Tender;Guarded/taut  -SP    Upper Trap  Right:;Tender  -SP       General ROM    GENERAL ROM COMMENTS  cervical AROM WFL  -SP       Right Upper Ext    Rt Shoulder Abduction PROM  72 degrees  -SP    Rt Shoulder Flexion PROM  90 degrees  -SP    Rt Shoulder External Rotation PROM  5 degrees  -SP    Rt Shoulder Internal Rotation PROM  50 degrees  -SP    Rt Elbow Extension/Flexion AROM  12 to 140 degrees  -SP    Rt Upper Extremity Comments   patient complains of pain at end ranges of all motion  -SP       MMT (Manual Muscle Testing)    General MMT Comments  strength not assessed  -SP       Sensation    Sensation WNL?  WFL  -SP       Upper Extremity Flexibility    Upper Trapezius  Moderately limited;Bilateral:  -SP    Pect Minor  Bilateral:;Moderately limited  -SP    Biceps  Right:;Moderately limited  -SP       Transfers    Bed-Chair [place] (Transfers)  independent  -SP    Chair-Bed [place] (Transfers)  independent  -SP    Sit-Stand [place] (Transfers)  independent  -SP    Stand-Sit [place] (Transfers)  independent  -SP       Gait/Stairs (Locomotion)    [place] Level (Gait)  independent  -SP      User Key  (r) = Recorded By, (t) = Taken By, (c) = Cosigned By    Initials Name Provider Type    Chantell Coley, PT Physical Therapist                      Therapy Education  Given: HEP  Program: New  How  Provided: Verbal  Provided to: Patient  Level of Understanding: Verbalized, Demonstrated     PT OP Goals     Row Name 06/02/21 1100          PT Short Term Goals    STG Date to Achieve  06/17/21  -SP     STG 1  Patient demonstrates right shoulder flexion PROM to >90 degrees  -SP     STG 2  Patient with decreased edema right upper arm by 1 cm   -SP     STG 3  Patient able to verbalize post-operative shoulder precautions  -SP     STG 4  Patient able to perform light waist level ADLs without difficulty or increased pain  -SP        Long Term Goals    LTG Date to Achieve  07/02/21  -SP     LTG 1  Patient demonstrates PROM right shoulder flexion/scaption to >120 degrees  -SP     LTG 2  Patient demonstrates right shoulder AROM flexion to >100 degrees  -SP     LTG 3  Patient reports improved ability to complete light home and community ADLs with pain level 1-2/10  -SP     LTG 4  Patient independent with HEP  -SP        Time Calculation    PT Goal Re-Cert Due Date  07/02/21  -SP       User Key  (r) = Recorded By, (t) = Taken By, (c) = Cosigned By    Initials Name Provider Type    Chantell Coley, PT Physical Therapist          PT Assessment/Plan     Row Name 06/02/21 1714          PT Assessment    Functional Limitations  Limitation in home management;Limitations in community activities;Performance in self-care ADL;Performance in leisure activities;Limitations in functional capacity and performance  -SP     Assessment Comments  Patient is s/p reduction of dislocation of right TSA on 5-14-21. (TSA 3-22-21).  Patient presents with pain,  decreased right shoulder ROM, weakness, difficulty sleeping and inability use right UE to assist in ADLs and self care.  -SP     Please refer to paper survey for additional self-reported information  Yes  -SP     Rehab Potential  Good  -SP     Patient/caregiver participated in establishment of treatment plan and goals  Yes  -SP     Patient would benefit from skilled therapy  intervention  Yes  -SP        PT Plan    PT Frequency  2x/week  -SP     Predicted Duration of Therapy Intervention (PT)  6-8 weeks  -SP     Planned CPT's?  PT EVAL MOD COMPLELITY: 47724;PT THER PROC EA 15 MIN: 01443;PT MANUAL THERAPY EA 15 MIN: 11689;PT HOT OR COLD PACK TREAT MCARE;PT ELECTRICAL STIM UNATTEND:   -SP       User Key  (r) = Recorded By, (t) = Taken By, (c) = Cosigned By    Initials Name Provider Type    Chantell Coley, PT Physical Therapist          Modalities     Row Name 06/02/21 1100             Ice    Ice Applied  Yes  -SP      Location  (R) shoulder-  pt supine  -SP      Ice S/P Rx  Yes  -SP         ELECTRICAL STIMULATION    Attended/Unattended  Unattended  -SP      Stimulation Type  IFC  -SP      Location/Electrode Placement/Other  right shoulder with ice with pt supine  -SP        User Key  (r) = Recorded By, (t) = Taken By, (c) = Cosigned By    Initials Name Provider Type    Chantell Coley, PT Physical Therapist        OP Exercises     Row Name 06/02/21 1100             Exercise 1    Exercise Name 1  AROM right wrist flex/ext, forearm pronation/supination; elbow flex/ext; hand pumps  -SP      Cueing 1  Verbal;Demo  -SP      Reps 1  10 x each  -SP        User Key  (r) = Recorded By, (t) = Taken By, (c) = Cosigned By    Initials Name Provider Type    Chantell Coley, PT Physical Therapist           Manual Rx (last 36 hours)      Manual Treatments     Row Name 06/02/21 1100             Manual Rx 1    Manual Rx 1 Location  right shoulder  -SP      Manual Rx 1 Type  PROM into all planes 10 x 10 sec  -SP      Manual Rx 1 Duration  10 min  -SP        User Key  (r) = Recorded By, (t) = Taken By, (c) = Cosigned By    Initials Name Provider Type    Chantell Coley, PT Physical Therapist                      Outcome Measure Options: Quick DASH  Quick DASH  Open a tight or new jar.: Unable  Do heavy household chores (e.g., wash walls, wash floors):  Moderate Difficulty  Carry a shopping bag or briefcase: Moderate Difficulty  Wash your back: Severe Difficulty  Use a knife to cut food: Severe Difficulty  Recreational activities in which you take some force or impact through your arm, should or hand (e.g. golf, hammering, tennis, etc.): Unable  During the past week, to what extent has your arm, shoulder, or hand problem interfered with your normal social activites with family, friends, neighbors or groups?: Moderately  During the past week, were you limited in your work or other regular daily activities as a result of your arm, shoulder or hand problem?: Moderately Limited  Arm, Shoulder, or hand pain: Moderate  Tingling (pins and needles) in your arm, shoulder, or hand: None  During the past week, how much difficulty have you had sleeping because of the pain in your arm, shoulder or hand?: Moderate Difficiculty  Number of Questions Answered: 11  Quick DASH Score: 59.09         Time Calculation:     Start Time: 1100  Stop Time: 1200  Time Calculation (min): 60 min  Untimed Charges  PT Eval/Re-eval Minutes: 60  Total Minutes  Untimed Charges Total Minutes: 60   Total Minutes: 60     Therapy Charges for Today     Code Description Service Date Service Provider Modifiers Qty    37138827660 HC PT EVAL MOD COMPLEXITY 3 6/2/2021 Chantell Denton, PT GP 1          PT G-Codes  Outcome Measure Options: Quick DASH  Quick DASH Score: 59.09         Chantell Denton, PT  6/2/2021

## 2021-06-04 ENCOUNTER — HOSPITAL ENCOUNTER (OUTPATIENT)
Dept: PHYSICAL THERAPY | Facility: HOSPITAL | Age: 76
Setting detail: THERAPIES SERIES
Discharge: HOME | End: 2021-06-04

## 2021-06-04 DIAGNOSIS — Z96.611 S/P REVERSE TOTAL SHOULDER ARTHROPLASTY, RIGHT: Primary | ICD-10-CM

## 2021-06-04 PROCEDURE — 97140 MANUAL THERAPY 1/> REGIONS: CPT

## 2021-06-04 NOTE — THERAPY TREATMENT NOTE
Outpatient Physical Therapy Ortho Treatment Note  EDWARD Ramirez     Patient Name: Britta Armijo  : 1945  MRN: [id number]  Today's Date: 2021      Visit Date: 2021    Visit Dx:    ICD-10-CM ICD-9-CM   1. 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)        Past Medical History:   Diagnosis Date   • Arthritis of back     NECK & LOWER BACK   • COVID-19 vaccine series completed    • Fracture, humerus    • Generalized anxiety disorder with panic attacks    • GERD (gastroesophageal reflux disease)    • Heart attack (CMS/HCC)     YEARS AGO   • Hyperlipidemia    • Hypertension    • Lumbosacral disc disease    • MRSA (methicillin resistant staph aureus) culture positive 2018    POSITIVE NASAL SWAB PRIOR TO HIP SURGERY   • Osteoporosis    • Right shoulder pain     SCHEDULED FOR TOTAL SHOULDER   • Unable to read or write     UNABLE TO READ        Past Surgical History:   Procedure Laterality Date   • APPENDECTOMY     • BREAST LUMPECTOMY Left     BENIGN   • BREAST SURGERY  2020   • CATARACT EXTRACTION, BILATERAL     • CHOLECYSTECTOMY OPEN     • EPIDURAL BLOCK     • HEMORRHOIDECTOMY      X4   • HIP SURGERY Right     Replacement   • HYSTERECTOMY     • SHOULDER MANIPULATION Right 2021    Procedure: REVERSE TOTAL SHOULDER MANIPULATION;  Surgeon: Derrick Pelayo MD;  Location: [facility] OR;  Service: Orthopedics;  Laterality: Right;   • TOTAL SHOULDER ARTHROPLASTY W/ DISTAL CLAVICLE EXCISION Right 3/22/2021    Procedure: TOTAL SHOULDER REVERSE ARTHROPLASTY;  Surgeon: Derrick Pelayo MD;  Location: [facility] OR;  Service: Orthopedics;  Laterality: Right;  TOTAL SHOULDER REVERSE  ARTHROPLASTY   • TUMOR REMOVAL  2017    RIGHT ARM                        PT Assessment/Plan     Row Name 06/04/21 0850          PT Assessment    Assessment Comments  Tightness and some gaurding noted with passive flexion and scaption,.  -KM        PT Plan    PT Plan Comments  Continue per POC  -KM       User Key  (r) = Recorded By, (t) = Taken By, (c) = Cosigned By    Initials Name Provider Type    Kailey Barrientos PTA Physical Therapy Assistant          Modalities     Row Name 06/04/21 0850             Moist Heat    MH Prior to Rx  No pt declined MHP  -KM         Ice    Location  (R) shoulder-  pt supine  -KM      Ice S/P Rx  Yes  -KM         ELECTRICAL STIMULATION    Attended/Unattended  Unattended  -KM      Stimulation Type  IFC  -KM      Location/Electrode Placement/Other  right shoulder with ice with pt supine  -KM         Functional Testing    Outcome Measure Options  Quick DASH  -KM        User Key  (r) = Recorded By, (t) = Taken By, (c) = Cosigned By    Initials Name Provider Type    Kailey Barrientos PTA Physical Therapy Assistant        OP Exercises     Row Name 06/04/21 0850             Subjective Comments    Subjective Comments  Pt states her shoulder is doing well and has not used it at all at home.   -KM        User Key  (r) = Recorded By, (t) = Taken By, (c) = Cosigned By    Initials Name Provider Type    Kailey Barrientos PTA Physical Therapy Assistant                      Manual Rx (last 36 hours)      Manual Treatments     Row Name 06/04/21 0850             Manual Rx 1    Manual Rx 1 Location  right shoulder  -KM      Manual Rx 1 Type  PROM into all planes 10 x 10 sec  -KM      Manual Rx 1 Duration  10 min  -KM        User Key  (r) = Recorded By, (t) = Taken By, (c) = Cosigned By    Initials Name Provider Type    Kailey Barrientos PTA Physical Therapy Assistant                   Outcome Measure Options: Quick DASH         Time Calculation:   Start Time: 0850  Stop Time: 0920  Time Calculation  (min): 30 min  Therapy Charges for Today     Code Description Service Date Service Provider Modifiers Qty    45602662799 HC PT MANUAL THERAPY EA 15 MIN 6/4/2021 Kailey Mckenzie, PTA GP 1          PT G-Codes  Outcome Measure Options: Quick DASH         Kailey Mckenzie, AKOSUA  6/4/2021

## 2021-06-07 ENCOUNTER — HOSPITAL ENCOUNTER (OUTPATIENT)
Dept: PHYSICAL THERAPY | Facility: HOSPITAL | Age: 76
Setting detail: THERAPIES SERIES
Discharge: HOME | End: 2021-06-07

## 2021-06-07 ENCOUNTER — OFFICE VISIT (OUTPATIENT)
Dept: ORTHOPEDIC SURGERY | Facility: CLINIC | Age: 76
End: 2021-06-07

## 2021-06-07 VITALS — HEIGHT: 63 IN | WEIGHT: 160 LBS | BODY MASS INDEX: 28.35 KG/M2

## 2021-06-07 DIAGNOSIS — Z96.611 S/P REVERSE TOTAL SHOULDER ARTHROPLASTY, RIGHT: Primary | ICD-10-CM

## 2021-06-07 DIAGNOSIS — Z96.611 STATUS POST REVERSE ARTHROPLASTY OF RIGHT SHOULDER: ICD-10-CM

## 2021-06-07 PROCEDURE — 99024 POSTOP FOLLOW-UP VISIT: CPT | Performed by: NURSE PRACTITIONER

## 2021-06-07 PROCEDURE — 73030 X-RAY EXAM OF SHOULDER: CPT | Performed by: NURSE PRACTITIONER

## 2021-06-07 NOTE — PROGRESS NOTES
Discussed plan of care with patient. We will continue with physical therapy for range of motion strengthening. Contacted physical therapy no evidence of shoulder dislocation. Contacted Dr. Duarte. She does have follow-up with him next week discussed to continue with shoulder range of motion. Patient verbalized understanding of all information agrees with plan of care. Denies other concerns

## 2021-06-07 NOTE — THERAPY TREATMENT NOTE
Outpatient Physical Therapy Ortho Treatment Note  EDWARD Ramirez     Patient Name: Britta Armijo  : 1945  MRN: [id number]  Today's Date: 2021      Visit Date: 2021    Visit Dx:    ICD-10-CM ICD-9-CM   1. 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)        Past Medical History:   Diagnosis Date   • Arthritis of back     NECK & LOWER BACK   • COVID-19 vaccine series completed    • Fracture, humerus    • Generalized anxiety disorder with panic attacks    • GERD (gastroesophageal reflux disease)    • Heart attack (CMS/HCC)     YEARS AGO   • Hyperlipidemia    • Hypertension    • Lumbosacral disc disease    • MRSA (methicillin resistant staph aureus) culture positive 2018    POSITIVE NASAL SWAB PRIOR TO HIP SURGERY   • Osteoporosis    • Right shoulder pain     SCHEDULED FOR TOTAL SHOULDER   • Unable to read or write     UNABLE TO READ        Past Surgical History:   Procedure Laterality Date   • APPENDECTOMY     • BREAST LUMPECTOMY Left     BENIGN   • BREAST SURGERY  2020   • CATARACT EXTRACTION, BILATERAL     • CHOLECYSTECTOMY OPEN     • EPIDURAL BLOCK     • HEMORRHOIDECTOMY      X4   • HIP SURGERY Right     Replacement   • HYSTERECTOMY     • SHOULDER MANIPULATION Right 2021    Procedure: REVERSE TOTAL SHOULDER MANIPULATION;  Surgeon: Derrick Pelayo MD;  Location: [facility] OR;  Service: Orthopedics;  Laterality: Right;   • TOTAL SHOULDER ARTHROPLASTY W/ DISTAL CLAVICLE EXCISION Right 3/22/2021    Procedure: TOTAL SHOULDER REVERSE ARTHROPLASTY;  Surgeon: Derrick Pelayo MD;  Location: [facility] OR;  Service: Orthopedics;  Laterality: Right;  TOTAL SHOULDER REVERSE  ARTHROPLASTY   • TUMOR REMOVAL  2017    RIGHT ARM                        PT Assessment/Plan     Row Name 06/07/21 1220          PT Assessment    Assessment Comments  Pt presents with increased edema anterior shoulder. Unable to complete passive stretching due to increased pain and extremely limited range. Chantell Hood notified during appoinement and requested pt to report to the office for an x-ray.   -KM        PT Plan    PT Plan Comments  Continue POC per APRN and results of x-ray  -KM       User Key  (r) = Recorded By, (t) = Taken By, (c) = Cosigned By    Initials Name Provider Type    Kailey Barrientos PTA Physical Therapy Assistant            OP Exercises     Row Name 06/07/21 1220             Subjective Comments    Subjective Comments  Pt states she has experience pain  all weekend with unknown cause; states she continues to stay in her sling  -KM        User Key  (r) = Recorded By, (t) = Taken By, (c) = Cosigned By    Initials Name Provider Type    Kailey Barrientos PTA Physical Therapy Assistant                                          Time Calculation:   Start Time: 1220  Stop Time: 1250  Time Calculation (min): 30 min                Kailey Mckenzie PTA  6/7/2021

## 2021-06-07 NOTE — PROGRESS NOTES
CC: F/u s/p closed reduction right reverse shoulder arthroplasty DOS 5/14/2021  S/p right reverse TSA 3/22/21    Interval history: Britta Armijo Presents to clinic today for evaluation of her right shoulder.  Reports Sunday began experiencing increased pain at the anterior lateral aspect of the shoulder denies known injury presented to physical therapy today noted increased pain during therapy.  Patient denies any injury denies any fall and again did began experiencing pain 1 day ago.  Denies any presence of numbness or tingling at the right upper extremity has continued with use of sling.    Exam:  Right shoulder examined  Maximal tenderness anterior lateral aspect of the shoulder increased pain with forward flexion reported forward flexion 80 degrees with apprehension noted  External rotation:  Positive sensation light touch on distributions the right upper extremity  2+ distal radial pulse  Deltoid firing    Imaging:  Right Shoulder X-Ray  Indication: pain  AP, scapular Y view, and axillary lateral views    Findings:  Shoulder arthroplasty with anatomical alignment, no evidence of dislocation, no evidence of loosening  prior studies were available for comparison.     Assessment: s/p closed reduction right reverse shoulder arthroplasty DOS 5/14/2021  S/p right reverse TSA 3/22/21    Plan:  1.  Discussed plan of care with patient.  We will continue with physical therapy for range of motion strengthening.  Contacted physical therapy no evidence of shoulder dislocation.  Contacted Dr. Duarte.  She does have follow-up with him next week discussed to continue with shoulder range of motion.  Patient verbalized understanding of all information agrees with plan of care.  Denies other concerns.

## 2021-06-15 ENCOUNTER — HOSPITAL ENCOUNTER (OUTPATIENT)
Dept: PHYSICAL THERAPY | Facility: HOSPITAL | Age: 76
Setting detail: THERAPIES SERIES
Discharge: HOME | End: 2021-06-15

## 2021-06-15 DIAGNOSIS — Z96.611 S/P REVERSE TOTAL SHOULDER ARTHROPLASTY, RIGHT: Primary | ICD-10-CM

## 2021-06-15 PROCEDURE — 97140 MANUAL THERAPY 1/> REGIONS: CPT

## 2021-06-15 NOTE — THERAPY TREATMENT NOTE
Outpatient Physical Therapy Ortho Treatment Note  EDWARD Ramirez     Patient Name: Britta Armijo  : 1945  MRN: [id number]  Today's Date: 6/15/2021      Visit Date: 06/15/2021    Visit Dx:    ICD-10-CM ICD-9-CM   1. 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)        Past Medical History:   Diagnosis Date   • Arthritis of back     NECK & LOWER BACK   • COVID-19 vaccine series completed    • Fracture, humerus    • Generalized anxiety disorder with panic attacks    • GERD (gastroesophageal reflux disease)    • Heart attack (CMS/HCC)     YEARS AGO   • Hyperlipidemia    • Hypertension    • Lumbosacral disc disease    • MRSA (methicillin resistant staph aureus) culture positive 2018    POSITIVE NASAL SWAB PRIOR TO HIP SURGERY   • Osteoporosis    • Right shoulder pain     SCHEDULED FOR TOTAL SHOULDER   • Unable to read or write     UNABLE TO READ        Past Surgical History:   Procedure Laterality Date   • APPENDECTOMY     • BREAST LUMPECTOMY Left     BENIGN   • BREAST SURGERY  2020   • CATARACT EXTRACTION, BILATERAL     • CHOLECYSTECTOMY OPEN     • EPIDURAL BLOCK     • HEMORRHOIDECTOMY      X4   • HIP SURGERY Right     Replacement   • HYSTERECTOMY     • SHOULDER MANIPULATION Right 2021    Procedure: REVERSE TOTAL SHOULDER MANIPULATION;  Surgeon: Derrick Pelayo MD;  Location: [facility] OR;  Service: Orthopedics;  Laterality: Right;   • TOTAL SHOULDER ARTHROPLASTY W/ DISTAL CLAVICLE EXCISION Right 3/22/2021    Procedure: TOTAL SHOULDER REVERSE ARTHROPLASTY;  Surgeon: Derrick Pelayo MD;  Location: [facility] OR;  Service: Orthopedics;  Laterality: Right;  TOTAL SHOULDER REVERSE  ARTHROPLASTY   • TUMOR REMOVAL  2017    RIGHT ARM                        PT Assessment/Plan     Row Name 06/15/21 1130          PT Assessment    Assessment Comments  Continue to push passive motion as tolerated. Pt unable to complete supine can flexion due to pain and instability. Pt continues to have increased edema and point tenderness anterior aspect of shoulder  -KM        PT Plan    PT Plan Comments  Continue POC per MD.  -KM       User Key  (r) = Recorded By, (t) = Taken By, (c) = Cosigned By    Initials Name Provider Type    Kailey Barrientos PTA Physical Therapy Assistant          Modalities     Row Name 06/15/21 1130             Moist Heat    MH Applied  Yes  -KM      Location  (R) shoulder- sitting  -KM      PT Moist Heat Minutes  10  -KM      MH Prior to Rx  No pt declined MHP  -KM         Ice    Location  (R) shoulder-  pt supine  -KM      Ice S/P Rx  Yes  -KM         ELECTRICAL STIMULATION    Attended/Unattended  Unattended  -KM      Stimulation Type  IFC  -KM      Location/Electrode Placement/Other  right shoulder with ice with pt supine  -KM         Functional Testing    Outcome Measure Options  Quick DASH  -KM        User Key  (r) = Recorded By, (t) = Taken By, (c) = Cosigned By    Initials Name Provider Type    Kailey Barrientos PTA Physical Therapy Assistant        OP Exercises     Row Name 06/15/21 1130             Subjective Comments    Subjective Comments  Pt states she has pain in the front of her shoulder. States she only takes the sling off when she showers.   -KM         Exercise 1    Exercise Name 1  Swiss Ball Rolls Flexion  -KM      Reps 1  10x  -KM        User Key  (r) = Recorded By, (t) = Taken By, (c) = Cosigned By    Initials Name Provider Type    Kailey Barrientos PTA Physical Therapy Assistant                      Manual Rx (last 36 hours)      Manual Treatments     Row Name 06/15/21 1130             Manual Rx 1    Manual Rx 1 Location  right shoulder  -KM      Manual Rx 1 Type   PROM into all planes 10 x 10 sec  -KM      Manual Rx 1 Duration  10 min  -KM        User Key  (r) = Recorded By, (t) = Taken By, (c) = Cosigned By    Initials Name Provider Type    Kailey Barrientos PTA Physical Therapy Assistant                   Outcome Measure Options: Quick DASH         Time Calculation:   Start Time: 1130  Stop Time: 1210  Time Calculation (min): 40 min  Untimed Charges  PT Moist Heat Minutes: 10  Total Minutes  Untimed Charges Total Minutes: 10   Total Minutes: 10  Therapy Charges for Today     Code Description Service Date Service Provider Modifiers Qty    22077688967 HC PT MANUAL THERAPY EA 15 MIN 6/15/2021 Kailey Mckenize PTA GP 1          PT G-Codes  Outcome Measure Options: Quick DASH         Kailey Mckenzie PTA  6/15/2021

## 2021-06-16 ENCOUNTER — OFFICE VISIT (OUTPATIENT)
Dept: ORTHOPEDIC SURGERY | Facility: CLINIC | Age: 76
End: 2021-06-16

## 2021-06-16 VITALS — BODY MASS INDEX: 28.35 KG/M2 | HEIGHT: 63 IN | WEIGHT: 160 LBS

## 2021-06-16 DIAGNOSIS — M25.511 CHRONIC RIGHT SHOULDER PAIN: ICD-10-CM

## 2021-06-16 DIAGNOSIS — Z96.611 STATUS POST REVERSE ARTHROPLASTY OF RIGHT SHOULDER: ICD-10-CM

## 2021-06-16 DIAGNOSIS — G89.29 CHRONIC RIGHT SHOULDER PAIN: ICD-10-CM

## 2021-06-16 DIAGNOSIS — Z96.611 S/P REVERSE TOTAL SHOULDER ARTHROPLASTY, RIGHT: Primary | ICD-10-CM

## 2021-06-16 PROCEDURE — 99024 POSTOP FOLLOW-UP VISIT: CPT | Performed by: ORTHOPAEDIC SURGERY

## 2021-06-16 PROCEDURE — 73030 X-RAY EXAM OF SHOULDER: CPT | Performed by: ORTHOPAEDIC SURGERY

## 2021-06-16 RX ORDER — CYCLOBENZAPRINE HCL 5 MG
5 TABLET ORAL 3 TIMES DAILY PRN
Qty: 45 TABLET | Refills: 0 | Status: ON HOLD | OUTPATIENT
Start: 2021-06-16 | End: 2021-08-25

## 2021-06-16 RX ORDER — CIPROFLOXACIN 250 MG/1
TABLET, FILM COATED ORAL
Status: ON HOLD | COMMUNITY
Start: 2021-06-11 | End: 2021-08-25

## 2021-06-16 RX ORDER — PREDNISONE 10 MG/1
TABLET ORAL
Qty: 39 TABLET | Refills: 0 | Status: ON HOLD | OUTPATIENT
Start: 2021-06-16 | End: 2021-08-25

## 2021-06-16 NOTE — PROGRESS NOTES
CC: F/u s/p closed reduction right reverse shoulder arthroplasty DOS 2021  S/p right reverse TSA 3/22/21      Interval History: Patient returns to clinic stating pain is doing fairly well, has been using sling as instructed, denies any numbness or tingling over right arm. She has some residual pain while out of the sling. No fevers, chills, or sweats, and no drainage from incisions noted. She has started into physical therapy.    Exam:   Right shoulder- incisions healing well   Tolerates passive FF- 70,   ER- 30 with notable apprehension   Positive sensation all distributions right hand and proximal lateral aspect arm   Cap refill < 3 seconds, radial pulse 2+   Positive deltoid firing   Flex/extend fingers/thumb/wrist with 4+/5 strength, positive thumbs up, okay sign, cross finger adduction and abduction against resistance     Radiographic Review:  Right Shoulder X-Ray  Indication: Status post shoulder arthroplasty  AP, scapular Y, and axillary lateral views    Findings:  Shoulder arthroplasty components appear to be stable in position, well-seated, overall acceptable alignment.  No evidence of osteolysis, loosening, periprosthetic fracture, or reactive bone formation.    Compared to prior postoperative x-rays from office    Impression: s/p closed reduction right reverse shoulder arthroplasty     Plan:  1. Prescribe prednisone taper and flexeril.  2. Discontinue sling.  3. F/u in 2 weeks to evaluate motion and progress with PT. PT and patient should avoid internal rotation. We will consider CT scan if symptoms persist.  4. All questions answered      New Medications Ordered This Visit   Medications   • predniSONE (DELTASONE) 10 MG tablet     Si mg po daily x 3 days, then 40 mg po daily x 3 days, then 20 mg po daily x 3 days, then 10 mg po daily x 3 days     Dispense:  39 tablet     Refill:  0   • cyclobenzaprine (FLEXERIL) 5 MG tablet     Sig: Take 1 tablet by mouth 3 (Three) Times a Day As Needed for  Muscle Spasms.     Dispense:  45 tablet     Refill:  0       Orders Placed This Encounter   Procedures   • XR Shoulder 2+ View Right     Order Specific Question:   Reason for Exam:     Answer:   RIGHT SHOULDER PAIN     Order Specific Question:   Release to patient     Answer:   Immediate       SCRIBE ATTESTATION:  I, Balbir Connelly, attest that all medical record entries for this patient were documented by me acting as a medical scribe for Derrick Pelayo MD.    PROVIDER ATTESTATION:  IDerrick MD, personally performed the services described in this documentation. All medical record entries made by the scribe were at my direction and in my presence. I have reviewed the chart and discharge instructions and agree that the record reflects my personal performance and is accurate and complete.  Derrick Pelayo MD.    Electronically signed: Derrick Pelayo MD 6/21/2021 17:44 EDT

## 2021-06-17 ENCOUNTER — HOSPITAL ENCOUNTER (OUTPATIENT)
Dept: PHYSICAL THERAPY | Facility: HOSPITAL | Age: 76
Setting detail: THERAPIES SERIES
Discharge: HOME | End: 2021-06-17

## 2021-06-17 DIAGNOSIS — Z96.611 S/P REVERSE TOTAL SHOULDER ARTHROPLASTY, RIGHT: Primary | ICD-10-CM

## 2021-06-17 PROCEDURE — 97110 THERAPEUTIC EXERCISES: CPT

## 2021-06-17 PROCEDURE — 97140 MANUAL THERAPY 1/> REGIONS: CPT

## 2021-06-17 NOTE — THERAPY TREATMENT NOTE
Outpatient Physical Therapy Ortho Treatment Note  EDWARD Ramirez     Patient Name: Britta Armijo  : 1945  MRN: [id number]  Today's Date: 2021      Visit Date: 2021    Visit Dx:    ICD-10-CM ICD-9-CM   1. 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)        Past Medical History:   Diagnosis Date   • Arthritis of back     NECK & LOWER BACK   • COVID-19 vaccine series completed    • Fracture, humerus    • Generalized anxiety disorder with panic attacks    • GERD (gastroesophageal reflux disease)    • Heart attack (CMS/HCC)     YEARS AGO   • Hyperlipidemia    • Hypertension    • Lumbosacral disc disease    • MRSA (methicillin resistant staph aureus) culture positive 2018    POSITIVE NASAL SWAB PRIOR TO HIP SURGERY   • Osteoporosis    • Right shoulder pain     SCHEDULED FOR TOTAL SHOULDER   • Unable to read or write     UNABLE TO READ        Past Surgical History:   Procedure Laterality Date   • APPENDECTOMY     • BREAST LUMPECTOMY Left     BENIGN   • BREAST SURGERY  2020   • CATARACT EXTRACTION, BILATERAL     • CHOLECYSTECTOMY OPEN     • EPIDURAL BLOCK     • HEMORRHOIDECTOMY      X4   • HIP SURGERY Right     Replacement   • HYSTERECTOMY     • SHOULDER MANIPULATION Right 2021    Procedure: REVERSE TOTAL SHOULDER MANIPULATION;  Surgeon: Derrick Pelayo MD;  Location: [facility] OR;  Service: Orthopedics;  Laterality: Right;   • TOTAL SHOULDER ARTHROPLASTY W/ DISTAL CLAVICLE EXCISION Right 3/22/2021    Procedure: TOTAL SHOULDER REVERSE ARTHROPLASTY;  Surgeon: Derrick Pelayo MD;  Location: [facility] OR;  Service: Orthopedics;  Laterality: Right;  TOTAL SHOULDER REVERSE  ARTHROPLASTY   • TUMOR REMOVAL  2017    RIGHT ARM                        PT Assessment/Plan     Row Name 06/17/21 1155          PT Assessment    Assessment Comments  Pt with improved tolerance and range with passive motion; IR not completed per MD. Incorporated additional AAROM. Instructed to pt to complete supine clasp hand, ball rolls and table slides at home  -KM        PT Plan    PT Plan Comments  Continue to progress ROM as tolerated  -KM       User Key  (r) = Recorded By, (t) = Taken By, (c) = Cosigned By    Initials Name Provider Type    Kailey Barrientos PTA Physical Therapy Assistant          Modalities     Row Name 06/17/21 1155             Moist Heat    Location  (R) shoulder- sitting  -KM      PT Moist Heat Minutes  10  -KM      MH Prior to Rx  No pt declined MHP  -KM         Ice    Location  (R) shoulder-  pt supine  -KM      Ice S/P Rx  Yes  -KM         ELECTRICAL STIMULATION    Attended/Unattended  Unattended  -KM      Stimulation Type  IFC  -KM      Location/Electrode Placement/Other  right shoulder with ice with pt supine  -KM         Functional Testing    Outcome Measure Options  Quick DASH  -KM        User Key  (r) = Recorded By, (t) = Taken By, (c) = Cosigned By    Initials Name Provider Type    Kailey Barrientos PTA Physical Therapy Assistant        OP Exercises     Row Name 06/17/21 1155             Subjective Comments    Subjective Comments  Pt states her f/ appointment went well; she can discontinue use of the sling and was prescribed a muscle relaxer which she feels has helped improved her motion.   -KM         Exercise 1    Exercise Name 1  Supine clasp hand flexion  -KM      Reps 1  --  -KM         Exercise 2    Exercise Name 2  Swiss Ball Rolls  -KM         Exercise 3    Exercise Name 3  Pulleys: Flexion  -KM        User Key  (r) = Recorded By, (t) = Taken By, (c) = Cosigned By    Initials Name Provider Type    Kailey Barrientos PTA Physical Therapy Assistant                       Manual Rx (last 36 hours)      Manual Treatments     Row Name 06/17/21 1155             Manual Rx 1    Manual Rx 1 Location  right shoulder  -KM      Manual Rx 1 Type  PROM 10 x 10 sec- no internal rotation   -KM      Manual Rx 1 Duration  10 min  -KM        User Key  (r) = Recorded By, (t) = Taken By, (c) = Cosigned By    Initials Name Provider Type    Kailey Barrientos PTA Physical Therapy Assistant                   Outcome Measure Options: Quick DASH         Time Calculation:   Start Time: 1155  Stop Time: 1255  Time Calculation (min): 60 min  Untimed Charges  PT Moist Heat Minutes: 10  Total Minutes  Untimed Charges Total Minutes: 10   Total Minutes: 10  Therapy Charges for Today     Code Description Service Date Service Provider Modifiers Qty    77242629291 HC PT MANUAL THERAPY EA 15 MIN 6/17/2021 Kailey Mckenzie PTA GP 1    95672813248 HC PT THER PROC EA 15 MIN 6/17/2021 Kailey Mckenzie PTA GP 1          PT G-Codes  Outcome Measure Options: Quick DASH         Kailey Mckenzie PTA  6/17/2021

## 2021-06-22 ENCOUNTER — APPOINTMENT (OUTPATIENT)
Dept: PHYSICAL THERAPY | Facility: HOSPITAL | Age: 76
End: 2021-06-22

## 2021-06-24 ENCOUNTER — HOSPITAL ENCOUNTER (OUTPATIENT)
Dept: PHYSICAL THERAPY | Facility: HOSPITAL | Age: 76
Setting detail: THERAPIES SERIES
Discharge: HOME | End: 2021-06-24

## 2021-06-24 DIAGNOSIS — Z96.611 S/P REVERSE TOTAL SHOULDER ARTHROPLASTY, RIGHT: Primary | ICD-10-CM

## 2021-06-24 PROCEDURE — 97110 THERAPEUTIC EXERCISES: CPT

## 2021-06-24 PROCEDURE — 97140 MANUAL THERAPY 1/> REGIONS: CPT

## 2021-06-24 NOTE — THERAPY TREATMENT NOTE
Outpatient Physical Therapy Ortho Treatment Note  EDWARD Ramirez     Patient Name: Britta Armijo  : 1945  MRN: [id number]  Today's Date: 2021      Visit Date: 2021    Visit Dx:    ICD-10-CM ICD-9-CM   1. 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)        Past Medical History:   Diagnosis Date   • Arthritis of back     NECK & LOWER BACK   • COVID-19 vaccine series completed    • Fracture, humerus    • Generalized anxiety disorder with panic attacks    • GERD (gastroesophageal reflux disease)    • Heart attack (CMS/HCC)     YEARS AGO   • Hyperlipidemia    • Hypertension    • Lumbosacral disc disease    • MRSA (methicillin resistant staph aureus) culture positive 2018    POSITIVE NASAL SWAB PRIOR TO HIP SURGERY   • Osteoporosis    • Right shoulder pain     SCHEDULED FOR TOTAL SHOULDER   • Unable to read or write     UNABLE TO READ        Past Surgical History:   Procedure Laterality Date   • APPENDECTOMY     • BREAST LUMPECTOMY Left     BENIGN   • BREAST SURGERY  2020   • CATARACT EXTRACTION, BILATERAL     • CHOLECYSTECTOMY OPEN     • EPIDURAL BLOCK     • HEMORRHOIDECTOMY      X4   • HIP SURGERY Right     Replacement   • HYSTERECTOMY     • SHOULDER MANIPULATION Right 2021    Procedure: REVERSE TOTAL SHOULDER MANIPULATION;  Surgeon: Derrick Pelayo MD;  Location: [facility] OR;  Service: Orthopedics;  Laterality: Right;   • TOTAL SHOULDER ARTHROPLASTY W/ DISTAL CLAVICLE EXCISION Right 3/22/2021    Procedure: TOTAL SHOULDER REVERSE ARTHROPLASTY;  Surgeon: Derrick Pelayo MD;  Location: [facility] OR;  Service: Orthopedics;  Laterality: Right;  TOTAL SHOULDER REVERSE  ARTHROPLASTY   • TUMOR REMOVAL  2017    RIGHT ARM                        PT Assessment/Plan     Row Name 06/24/21 9295          PT Assessment    Assessment Comments  Continue to push passive motion; improved ability to complete supine flexion with cane.   -KM        PT Plan    PT Plan Comments  Continue per POC  -KM       User Key  (r) = Recorded By, (t) = Taken By, (c) = Cosigned By    Initials Name Provider Type    Kailey Barrientos PTA Physical Therapy Assistant          Modalities     Row Name 06/24/21 1131             Subjective Comments    Subjective Comments  Pt states she feels the steroid and muscle relaxer has help and she continues to move her shoulder  -KM         Moist Heat    Location  (R) shoulder- sitting  -KM      PT Moist Heat Minutes  10  -KM      MH Prior to Rx  No pt declined MHP  -KM         Ice    Location  (R) shoulder-  pt supine  -KM      Ice S/P Rx  Yes  -KM         ELECTRICAL STIMULATION    Attended/Unattended  Unattended  -KM      Stimulation Type  IFC  -KM      Location/Electrode Placement/Other  right shoulder with ice with pt supine  -KM         Functional Testing    Outcome Measure Options  Quick DASH  -KM        User Key  (r) = Recorded By, (t) = Taken By, (c) = Cosigned By    Initials Name Provider Type    Kailey Barrientos PTA Physical Therapy Assistant        OP Exercises     Row Name 06/24/21 5085             Subjective Comments    Subjective Comments  Pt states she feels the steroid and muscle relaxer has help and she continues to move her shoulder  -KM         Exercise 1    Exercise Name 1  Supine clasp hand flexion  -KM      Reps 1  15  -KM         Exercise 2    Exercise Name 2  Supine Cane Flexion  -KM      Reps 2  15  -KM         Exercise 3    Exercise Name 3  Pulleys: Flexion  -KM      Time 3  3 min  -KM        User Key  (r) = Recorded By, (t) = Taken By, (c) = Cosigned By    Initials Name Provider Type    Kailey Barrientos PTA Physical Therapy Assistant                       Manual Rx (last 36 hours)      Manual Treatments     Row Name 06/24/21 1135             Manual Rx 1    Manual Rx 1 Location  right shoulder  -KM      Manual Rx 1 Type  PROM 10 x 10 sec- no internal rotation   -KM      Manual Rx 1 Duration  10 min  -KM        User Key  (r) = Recorded By, (t) = Taken By, (c) = Cosigned By    Initials Name Provider Type    Kailey Barrientos PTA Physical Therapy Assistant                   Outcome Measure Options: Quick DASH         Time Calculation:   Start Time: 1135  Stop Time: 1233  Time Calculation (min): 58 min  Untimed Charges  PT Moist Heat Minutes: 10  Total Minutes  Untimed Charges Total Minutes: 10   Total Minutes: 10  Therapy Charges for Today     Code Description Service Date Service Provider Modifiers Qty    38462022141 HC PT MANUAL THERAPY EA 15 MIN 6/24/2021 Kailey Mckenzie PTA GP 1    55894004360 HC PT THER PROC EA 15 MIN 6/24/2021 Kailey Mckenzie PTA GP 1          PT G-Codes  Outcome Measure Options: Quick DASH         Kailey Mckenzie PTA  6/24/2021

## 2021-06-29 ENCOUNTER — HOSPITAL ENCOUNTER (OUTPATIENT)
Dept: PHYSICAL THERAPY | Facility: HOSPITAL | Age: 76
Setting detail: THERAPIES SERIES
Discharge: HOME | End: 2021-06-29

## 2021-06-29 DIAGNOSIS — Z96.611 S/P REVERSE TOTAL SHOULDER ARTHROPLASTY, RIGHT: Primary | ICD-10-CM

## 2021-06-29 PROCEDURE — 97140 MANUAL THERAPY 1/> REGIONS: CPT

## 2021-06-29 PROCEDURE — 97110 THERAPEUTIC EXERCISES: CPT

## 2021-06-29 NOTE — THERAPY TREATMENT NOTE
Outpatient Physical Therapy Ortho Treatment Note  EDWARD Ramirez     Patient Name: Britta Armijo  : 1945  MRN: [id number]  Today's Date: 2021      Visit Date: 2021    Visit Dx:    ICD-10-CM ICD-9-CM   1. 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)        Past Medical History:   Diagnosis Date   • Arthritis of back     NECK & LOWER BACK   • COVID-19 vaccine series completed    • Fracture, humerus    • Generalized anxiety disorder with panic attacks    • GERD (gastroesophageal reflux disease)    • Heart attack (CMS/HCC)     YEARS AGO   • Hyperlipidemia    • Hypertension    • Lumbosacral disc disease    • MRSA (methicillin resistant staph aureus) culture positive 2018    POSITIVE NASAL SWAB PRIOR TO HIP SURGERY   • Osteoporosis    • Right shoulder pain     SCHEDULED FOR TOTAL SHOULDER   • Unable to read or write     UNABLE TO READ        Past Surgical History:   Procedure Laterality Date   • APPENDECTOMY     • BREAST LUMPECTOMY Left     BENIGN   • BREAST SURGERY  2020   • CATARACT EXTRACTION, BILATERAL     • CHOLECYSTECTOMY OPEN     • EPIDURAL BLOCK     • HEMORRHOIDECTOMY      X4   • HIP SURGERY Right     Replacement   • HYSTERECTOMY     • SHOULDER MANIPULATION Right 2021    Procedure: REVERSE TOTAL SHOULDER MANIPULATION;  Surgeon: Derrick Pelayo MD;  Location: [facility] OR;  Service: Orthopedics;  Laterality: Right;   • TOTAL SHOULDER ARTHROPLASTY W/ DISTAL CLAVICLE EXCISION Right 3/22/2021    Procedure: TOTAL SHOULDER REVERSE ARTHROPLASTY;  Surgeon: Derrick Pelayo MD;  Location: [facility] OR;  Service: Orthopedics;  Laterality: Right;  TOTAL SHOULDER REVERSE  ARTHROPLASTY   • TUMOR REMOVAL  2017    RIGHT ARM        PT Ortho     Row Name 06/29/21 1215       Right Upper Ext    Rt Shoulder Abduction PROM  152  -KM    Rt Shoulder Flexion PROM  122  -KM    Rt Shoulder External Rotation PROM  60  -KM      User Key  (r) = Recorded By, (t) = Taken By, (c) = Cosigned By    Initials Name Provider Type    Kailey Barrientos PTA Physical Therapy Assistant                      PT Assessment/Plan     Row Name 06/29/21 1215          PT Assessment    Assessment Comments  Pt measures increased passive motion with much improved tolerance compared to previous session. Continue to focus on PROM and AAROM  -KM        PT Plan    PT Plan Comments  Continue POC per MD  -KM       User Key  (r) = Recorded By, (t) = Taken By, (c) = Cosigned By    Initials Name Provider Type    Kailey Barrientos PTA Physical Therapy Assistant          Modalities     Row Name 06/29/21 1215             Moist Heat    Location  (R) shoulder- supine  -KM      PT Moist Heat Minutes  10  -KM      MH Prior to Rx  Yes pt declined MHP  -KM         Ice    Location  (R) shoulder-  pt supine  -KM      Ice S/P Rx  Yes  -KM         ELECTRICAL STIMULATION    Attended/Unattended  Unattended  -KM      Stimulation Type  IFC  -KM      Location/Electrode Placement/Other  right shoulder with ice with pt supine  -KM         Functional Testing    Outcome Measure Options  Quick DASH  -KM        User Key  (r) = Recorded By, (t) = Taken By, (c) = Cosigned By    Initials Name Provider Type    Kailey Barrientos PTA Physical Therapy Assistant        OP Exercises     Row Name 06/29/21 1215             Subjective Comments    Subjective Comments  Pt states her shoulder is doing well and continues to complete the prescribed exercises at home. Pt to see MD tomorrow.   -KM         Exercise 1    Exercise Name 1  Supine clasp hand flexion  -KM      Reps 1  15  -KM         Exercise 2    Exercise Name 2  Supine Cane Flexion  -KM      Reps 2  15  -KM          Exercise 3    Exercise Name 3  Pulleys: Flexion  -KM      Time 3  3 min  -KM        User Key  (r) = Recorded By, (t) = Taken By, (c) = Cosigned By    Initials Name Provider Type    Kailey Barrientos PTA Physical Therapy Assistant                                Outcome Measure Options: Quick DASH         Time Calculation:   Start Time: 1215  Stop Time: 1304  Time Calculation (min): 49 min  Untimed Charges  PT Moist Heat Minutes: 10  Total Minutes  Untimed Charges Total Minutes: 10   Total Minutes: 10  Therapy Charges for Today     Code Description Service Date Service Provider Modifiers Qty    87315134854 HC PT MANUAL THERAPY EA 15 MIN 6/29/2021 Kailey Mckenzie PTA GP 1    15096137447 HC PT THER PROC EA 15 MIN 6/29/2021 Kailey Mckenzie PTA GP 1          PT G-Codes  Outcome Measure Options: Quick DASH         Kailey Mckenzie PTA  6/29/2021

## 2021-06-30 ENCOUNTER — OFFICE VISIT (OUTPATIENT)
Dept: ORTHOPEDIC SURGERY | Facility: CLINIC | Age: 76
End: 2021-06-30

## 2021-06-30 VITALS — BODY MASS INDEX: 28.35 KG/M2 | WEIGHT: 160 LBS | HEIGHT: 63 IN

## 2021-06-30 DIAGNOSIS — Z96.611 STATUS POST REVERSE ARTHROPLASTY OF RIGHT SHOULDER: ICD-10-CM

## 2021-06-30 DIAGNOSIS — Z96.611 INSTABILITY OF REVERSE TOTAL ARTHROPLASTY OF RIGHT SHOULDER (HCC): Primary | ICD-10-CM

## 2021-06-30 DIAGNOSIS — T84.028A INSTABILITY OF REVERSE TOTAL ARTHROPLASTY OF RIGHT SHOULDER (HCC): Primary | ICD-10-CM

## 2021-06-30 PROCEDURE — 99024 POSTOP FOLLOW-UP VISIT: CPT | Performed by: ORTHOPAEDIC SURGERY

## 2021-06-30 RX ORDER — MELOXICAM 15 MG/1
15 TABLET ORAL DAILY
Qty: 30 TABLET | Refills: 0 | Status: SHIPPED | OUTPATIENT
Start: 2021-06-30

## 2021-07-01 ENCOUNTER — TRANSCRIBE ORDERS (OUTPATIENT)
Dept: ADMINISTRATIVE | Facility: HOSPITAL | Age: 76
End: 2021-07-01

## 2021-07-01 DIAGNOSIS — R31.0 GROSS HEMATURIA: Primary | ICD-10-CM

## 2021-07-02 ENCOUNTER — HOSPITAL ENCOUNTER (OUTPATIENT)
Dept: PHYSICAL THERAPY | Facility: HOSPITAL | Age: 76
Setting detail: THERAPIES SERIES
Discharge: HOME | End: 2021-07-02

## 2021-07-02 DIAGNOSIS — Z96.611 S/P REVERSE TOTAL SHOULDER ARTHROPLASTY, RIGHT: Primary | ICD-10-CM

## 2021-07-02 PROCEDURE — 97110 THERAPEUTIC EXERCISES: CPT

## 2021-07-02 PROCEDURE — 97140 MANUAL THERAPY 1/> REGIONS: CPT

## 2021-07-02 NOTE — THERAPY TREATMENT NOTE
Outpatient Physical Therapy Ortho Treatment Note  EDWARD Ramirez     Patient Name: Britta Armijo  : 1945  MRN: [id number]  Today's Date: 2021      Visit Date: 2021    Visit Dx:    ICD-10-CM ICD-9-CM   1. 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)        Past Medical History:   Diagnosis Date   • Arthritis of back     NECK & LOWER BACK   • COVID-19 vaccine series completed    • Fracture, humerus    • Generalized anxiety disorder with panic attacks    • GERD (gastroesophageal reflux disease)    • Heart attack (CMS/HCC)     YEARS AGO   • Hyperlipidemia    • Hypertension    • Lumbosacral disc disease    • MRSA (methicillin resistant staph aureus) culture positive 2018    POSITIVE NASAL SWAB PRIOR TO HIP SURGERY   • Osteoporosis    • Right shoulder pain     SCHEDULED FOR TOTAL SHOULDER   • Unable to read or write     UNABLE TO READ        Past Surgical History:   Procedure Laterality Date   • APPENDECTOMY     • BREAST LUMPECTOMY Left     BENIGN   • BREAST SURGERY  2020   • CATARACT EXTRACTION, BILATERAL     • CHOLECYSTECTOMY OPEN     • EPIDURAL BLOCK     • HEMORRHOIDECTOMY      X4   • HIP SURGERY Right     Replacement   • HYSTERECTOMY     • SHOULDER MANIPULATION Right 2021    Procedure: REVERSE TOTAL SHOULDER MANIPULATION;  Surgeon: Derrick Pelayo MD;  Location: [facility] OR;  Service: Orthopedics;  Laterality: Right;   • TOTAL SHOULDER ARTHROPLASTY W/ DISTAL CLAVICLE EXCISION Right 3/22/2021    Procedure: TOTAL SHOULDER REVERSE ARTHROPLASTY;  Surgeon: Derrick Pelayo MD;  Location: [facility] OR;  Service: Orthopedics;  Laterality: Right;  TOTAL SHOULDER REVERSE  ARTHROPLASTY   • TUMOR REMOVAL  2017    RIGHT ARM                        PT Assessment/Plan     Row Name 07/02/21 1215          PT Assessment    Assessment Comments  Pt is progressing well with passive motion; improved ability to complete cane stretches in supine. Initiated strengthening with good tolerance.   -KM        PT Plan    PT Plan Comments  Continue to progress as tolerated  -KM       User Key  (r) = Recorded By, (t) = Taken By, (c) = Cosigned By    Initials Name Provider Type    Kailey Barrientos PTA Physical Therapy Assistant          Modalities     Row Name 07/02/21 1215             Subjective Comments    Subjective Comments  Pt states her f/u appointment went well and to continue progressing ROM  -KM         Moist Heat    Location  (R) shoulder- supine  -KM      PT Moist Heat Minutes  10  -KM      MH Prior to Rx  Yes pt declined MHP  -KM         Ice    Location  (R) shoulder-  pt supine  -KM      Ice S/P Rx  Yes  -KM         ELECTRICAL STIMULATION    Attended/Unattended  Unattended  -KM      Stimulation Type  IFC  -KM      Location/Electrode Placement/Other  right shoulder with ice with pt supine  -KM         Functional Testing    Outcome Measure Options  Quick DASH  -KM        User Key  (r) = Recorded By, (t) = Taken By, (c) = Cosigned By    Initials Name Provider Type    Kailey Barrientos PTA Physical Therapy Assistant        OP Exercises     Row Name 07/02/21 1215             Subjective Comments    Subjective Comments  Pt states her f/u appointment went well and to continue progressing ROM  -KM         Exercise 1    Exercise Name 1  Supine clasp hand flexion  -KM      Reps 1  15  -KM         Exercise 2    Exercise Name 2  Supine Cane Flexion  -KM      Reps 2  15  -KM         Exercise 3    Exercise Name 3  Pulleys: Flexion & ABd  -KM      Time 3  3 min  -KM         Exercise 4    Exercise Name 4  Bicep Curl  -KM      Reps 4  20  -KM      Time 4  1#  -KM         Exercise 5    Exercise Name 5  TB: Rows  & Ext  -KM      Reps 5  20  -KM      Time 5  Red  -KM        User Key  (r) = Recorded By, (t) = Taken By, (c) = Cosigned By    Initials Name Provider Type    Kailey Barrientos PTA Physical Therapy Assistant                                Outcome Measure Options: Quick DASH         Time Calculation:   Start Time: 1215  Stop Time: 1320  Time Calculation (min): 65 min  Untimed Charges  PT Moist Heat Minutes: 10  Total Minutes  Untimed Charges Total Minutes: 10   Total Minutes: 10  Therapy Charges for Today     Code Description Service Date Service Provider Modifiers Qty    60260941753 HC PT MANUAL THERAPY EA 15 MIN 7/2/2021 Kailey Mckenzie PTA GP 1    08287068217 HC PT THER PROC EA 15 MIN 7/2/2021 Kailey Mckenzie PTA GP 1          PT G-Codes  Outcome Measure Options: Quick DASH         Kailey Mckenzie PTA  7/2/2021

## 2021-07-06 ENCOUNTER — HOSPITAL ENCOUNTER (OUTPATIENT)
Dept: PHYSICAL THERAPY | Facility: HOSPITAL | Age: 76
Setting detail: THERAPIES SERIES
Discharge: HOME | End: 2021-07-06

## 2021-07-06 PROCEDURE — 97110 THERAPEUTIC EXERCISES: CPT

## 2021-07-06 PROCEDURE — 97140 MANUAL THERAPY 1/> REGIONS: CPT

## 2021-07-06 NOTE — THERAPY TREATMENT NOTE
Outpatient Physical Therapy Ortho Treatment Note  EDWARD Ramirez     Patient Name: Britta Armijo  : 1945  MRN: [id number]  Today's Date: 2021      Visit Date: 2021    Visit Dx:  No diagnosis found.    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 (CMS/HCC)        Past Medical History:   Diagnosis Date   • Arthritis of back     NECK & LOWER BACK   • COVID-19 vaccine series completed    • Fracture, humerus    • Generalized anxiety disorder with panic attacks    • GERD (gastroesophageal reflux disease)    • Heart attack (CMS/HCC)     YEARS AGO   • Hyperlipidemia    • Hypertension    • Lumbosacral disc disease    • MRSA (methicillin resistant staph aureus) culture positive 2018    POSITIVE NASAL SWAB PRIOR TO HIP SURGERY   • Osteoporosis    • Right shoulder pain     SCHEDULED FOR TOTAL SHOULDER   • Unable to read or write     UNABLE TO READ        Past Surgical History:   Procedure Laterality Date   • APPENDECTOMY     • BREAST LUMPECTOMY Left     BENIGN   • BREAST SURGERY  2020   • CATARACT EXTRACTION, BILATERAL     • CHOLECYSTECTOMY OPEN     • EPIDURAL BLOCK     • HEMORRHOIDECTOMY      X4   • HIP SURGERY Right     Replacement   • HYSTERECTOMY     • SHOULDER MANIPULATION Right 2021    Procedure: REVERSE TOTAL SHOULDER MANIPULATION;  Surgeon: Derrick Pelayo MD;  Location: [facility] OR;  Service: Orthopedics;  Laterality: Right;   • TOTAL SHOULDER ARTHROPLASTY W/ DISTAL CLAVICLE EXCISION Right 3/22/2021    Procedure: TOTAL SHOULDER REVERSE ARTHROPLASTY;  Surgeon: Derrick Pelayo MD;  Location:  [facility] OR;  Service: Orthopedics;  Laterality: Right;  TOTAL SHOULDER REVERSE ARTHROPLASTY   • TUMOR REMOVAL  2017    RIGHT ARM                         PT Assessment/Plan     Row Name 07/06/21 1230          PT Assessment    Assessment Comments  Continued to push passive motion; Pt with limited ability and required assist to complete AROM in supine. Instructed to pt to complete HEP 1x/day due to soreness  -KM        PT Plan    PT Plan Comments  Continue per POC  -KM       User Key  (r) = Recorded By, (t) = Taken By, (c) = Cosigned By    Initials Name Provider Type    Kailey Barrientos PTA Physical Therapy Assistant          Modalities     Row Name 07/06/21 1230             Moist Heat    Location  (R) shoulder- supine  -KM      PT Moist Heat Minutes  10  -KM      MH Prior to Rx  Yes pt declined MHP  -KM         Ice    Location  (R) shoulder-  pt supine  -KM      Ice S/P Rx  Yes  -KM         ELECTRICAL STIMULATION    Attended/Unattended  Unattended  -KM      Stimulation Type  IFC  -KM      Location/Electrode Placement/Other  right shoulder with ice with pt supine  -KM         Functional Testing    Outcome Measure Options  Quick DASH  -KM        User Key  (r) = Recorded By, (t) = Taken By, (c) = Cosigned By    Initials Name Provider Type    Kailey Barrientos PTA Physical Therapy Assistant        OP Exercises     Row Name 07/06/21 1230             Subjective Comments    Subjective Comments  Pt states her shoulder is really sore and has been compliant with HEP 2x/day  -KM         Exercise 1    Exercise Name 1  Supine clasp hand flexion  -KM      Reps 1  15  -KM         Exercise 2    Exercise Name 2  Supine Cane Flexion & Scaption  -KM      Reps 2  15  -KM         Exercise 3    Exercise Name 3  Pulleys: Flexion & ABd  -KM      Time 3  3 min  -KM         Exercise 4    Exercise Name 4  Bicep Curl  -KM      Reps 4  20  -KM      Time 4  1#  -KM         Exercise 5    Exercise Name 5  TB: Rows & Ext  -KM      Reps 5  20  -KM      Time 5  Red  -KM         Exercise 6    Exercise Name 6  Supine AROM Flexion  -KM      Reps 6  15  -KM      Time 6  Assist to  keep elbow extended  -KM         Exercise 7    Exercise Name 7  S/L ER  -KM      Reps 7  15  -KM         Exercise 8    Exercise Name 8  Wall slides: Flexion  -KM      Reps 8  5x  -KM        User Key  (r) = Recorded By, (t) = Taken By, (c) = Cosigned By    Initials Name Provider Type    Kailey Barrientos PTA Physical Therapy Assistant                      Manual Rx (last 36 hours)      Manual Treatments     Row Name 07/06/21 1230             Manual Rx 1    Manual Rx 1 Location  right shoulder  -KM      Manual Rx 1 Type  PROM 10 x 10 sec- no internal rotation   -KM      Manual Rx 1 Duration  10 min  -KM        User Key  (r) = Recorded By, (t) = Taken By, (c) = Cosigned By    Initials Name Provider Type    Kailey Barrientos PTA Physical Therapy Assistant                   Outcome Measure Options: Quick DASH         Time Calculation:   Start Time: 1230  Stop Time: 1342  Time Calculation (min): 72 min  Untimed Charges  PT Moist Heat Minutes: 10  Total Minutes  Untimed Charges Total Minutes: 10   Total Minutes: 10  Therapy Charges for Today     Code Description Service Date Service Provider Modifiers Qty    35475774623 HC PT MANUAL THERAPY EA 15 MIN 7/6/2021 Kailey Mckenzie PTA GP 1    53011687649 HC PT THER PROC EA 15 MIN 7/6/2021 Kailey Mckenzie PTA GP 1          PT G-Codes  Outcome Measure Options: Quick DASH         Kailey Mckenzie PTA  7/6/2021

## 2021-07-08 ENCOUNTER — APPOINTMENT (OUTPATIENT)
Dept: PHYSICAL THERAPY | Facility: HOSPITAL | Age: 76
End: 2021-07-08

## 2021-07-09 ENCOUNTER — HOSPITAL ENCOUNTER (OUTPATIENT)
Dept: CT IMAGING | Facility: HOSPITAL | Age: 76
Discharge: HOME | End: 2021-07-09
Admitting: UROLOGY

## 2021-07-09 DIAGNOSIS — R31.0 GROSS HEMATURIA: ICD-10-CM

## 2021-07-09 PROCEDURE — 0 IOPAMIDOL PER 1 ML: Performed by: UROLOGY

## 2021-07-09 PROCEDURE — 74178 CT ABD&PLV WO CNTR FLWD CNTR: CPT

## 2021-07-09 RX ADMIN — IOPAMIDOL 100 ML: 755 INJECTION, SOLUTION INTRAVENOUS at 11:29

## 2021-07-13 ENCOUNTER — HOSPITAL ENCOUNTER (OUTPATIENT)
Dept: PHYSICAL THERAPY | Facility: HOSPITAL | Age: 76
Setting detail: THERAPIES SERIES
Discharge: HOME | End: 2021-07-13

## 2021-07-13 DIAGNOSIS — Z96.611 S/P REVERSE TOTAL SHOULDER ARTHROPLASTY, RIGHT: Primary | ICD-10-CM

## 2021-07-13 PROCEDURE — 97140 MANUAL THERAPY 1/> REGIONS: CPT | Performed by: PHYSICAL THERAPIST

## 2021-07-13 PROCEDURE — 97110 THERAPEUTIC EXERCISES: CPT | Performed by: PHYSICAL THERAPIST

## 2021-07-13 NOTE — THERAPY TREATMENT NOTE
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 (CMS/HCC)        Past Medical History:   Diagnosis Date   • Arthritis of back     NECK & LOWER BACK   • COVID-19 vaccine series completed    • Fracture, humerus    • Generalized anxiety disorder with panic attacks    • GERD (gastroesophageal reflux disease)    • Heart attack (CMS/HCC)     YEARS AGO   • Hyperlipidemia    • Hypertension    • Lumbosacral disc disease    • MRSA (methicillin resistant staph aureus) culture positive 2018    POSITIVE NASAL SWAB PRIOR TO HIP SURGERY   • Osteoporosis    • Right shoulder pain     SCHEDULED FOR TOTAL SHOULDER   • Unable to read or write     UNABLE TO READ        Past Surgical History:   Procedure Laterality Date   • APPENDECTOMY     • BREAST LUMPECTOMY Left     BENIGN   • BREAST SURGERY  2020   • CATARACT EXTRACTION, BILATERAL     • CHOLECYSTECTOMY OPEN     • EPIDURAL BLOCK     • HEMORRHOIDECTOMY      X4   • HIP SURGERY Right     Replacement   • HYSTERECTOMY     • SHOULDER MANIPULATION Right 2021    Procedure: REVERSE TOTAL SHOULDER MANIPULATION;  Surgeon: Derrick Pelayo MD;  Location:  [facility] OR;  Service: Orthopedics;  Laterality: Right;   • TOTAL SHOULDER ARTHROPLASTY W/ DISTAL CLAVICLE EXCISION Right 3/22/2021    Procedure: TOTAL SHOULDER REVERSE ARTHROPLASTY;  Surgeon: Derrick Pelayo MD;  Location:  [facility] OR;  Service: Orthopedics;  Laterality: Right;  TOTAL SHOULDER REVERSE ARTHROPLASTY   • TUMOR REMOVAL  2017    RIGHT ARM        Visit Dx:     ICD-10-CM ICD-9-CM   1. S/P reverse  "total shoulder arthroplasty, right  Z96.611 V43.61             PT Ortho     Row Name 07/13/21 1500       Subjective Comments    Subjective Comments  Patient reports that her shoulder is improving and she has been doing her exercises regularly at home.   However, patient reports that today she is having increased pain at anterior aspect of right shoulder and anterior aspect of arm.  Patient states that she is unsure of anything that she could have done that would have increased her pain.  Patient states \"I may have slept on it wrong\"  Patient states that she is able to dress upper and lower body, perform self care and light ADLs independently but with increased time required.  Patient states that she is able to reach into cabinets with assist of left (nonsurgical) arm.    -SP       Posture/Observations    Rounded Shoulders  Left:;Moderate  -SP    Observations  Edema;Incision healing;Muscle atrophy  -SP    Posture/Observations Comments  Patient with mild edema right mid and upper arm; incision healing well with no signs or symptoms of infection.  No ecchymosis noted  -SP       Shoulder Girdle Palpation    Subacromial Space  Right:;Tender;Guarded/taut;Swollen  -SP    AC Joint  Right:;Tender  -SP    Long Head of Biceps  Right:;Tender;Swollen  -SP    Short Head of Biceps  Bilateral:;Tender;Swollen  -SP    Deltoid  Right:;Tender;Swollen  -SP    Pect Minor  Right:;Tender;Guarded/taut  -SP    Upper Trap  Right:;Tender  -SP       Right Upper Ext    Rt Shoulder Abduction AROM  35 degrees  -SP    Rt Shoulder Abduction PROM  138 degrees  -SP    Rt Shoulder Flexion AROM  20 degrees  -SP    Rt Shoulder Flexion PROM  120 degrees   -SP    Rt Shoulder External Rotation PROM  60 degrees  -SP    Rt Elbow Extension/Flexion AROM  8 to 140 degrees  -SP       MMT Right Upper Ext    Rt Shoulder Flexion MMT, Gross Movement  (2/5) poor  -SP    Rt Shoulder Extension MMT, Gross Movement  (3+/5) fair plus  -SP    Rt Shoulder ABduction MMT, " Gross Movement  (2/5) poor  -SP    Rt Shoulder Internal Rotation MMT, Gross Movement  (3/5) fair  -SP    Rt Shoulder External Rotation MMT, Gross Movement  (2+/5) poor plus  -SP    Rt Scapular ADduction MMT, Gross Movement  (2/5) poor  -SP    Rt Elbow Flexion MMT, Gross Movement:  (3+/5) fair plus  -SP    Rt Elbow Extension MMT, Gross Movement:  (3+/5) fair plus  -SP       Sensation    Sensation WNL?  WFL  -SP       Upper Extremity Flexibility    Upper Trapezius  Moderately limited;Bilateral:  -SP    Pect Minor  Bilateral:;Moderately limited  -SP    Biceps  Right:;Moderately limited  -SP       Transfers    Bed-[place] (Transfers)  independent  -SP    Chair-[place] (Transfers)  independent  -SP    Sit-[place] (Transfers)  independent  -SP    Stand-[place] (Transfers)  independent  -SP       Gait/Stairs (Locomotion)    [place] (Gait)  independent  -SP      User Key  (r) = Recorded By, (t) = Taken By, (c) = Cosigned By    Initials Name Provider Type    Chantell Coley, PT Physical Therapist                      Therapy Education  Given: HEP  Program: Reinforced  How Provided: Verbal  Provided to: Patient  Level of Understanding: Verbalized, Demonstrated     PT OP Goals     Row Name 07/13/21 1500          PT Short Term Goals    STG Date to Achieve  07/28/21  -SP     STG 1  Patient demonstrates right shoulder flexion PROM to >90 degrees  -SP     STG 1 Progress  Met  -SP     STG 2  Patient with decreased edema right upper arm by 1 cm   -SP     STG 2 Progress  Met  -SP     STG 3  Patient able to verbalize post-operative shoulder precautions  -SP     STG 3 Progress  Partially Met  -SP     STG 4  Patient able to perform light waist level ADLs without difficulty or increased pain  -SP     STG 4 Progress  Met  -SP        Long Term Goals    LTG Date to Achieve  07/02/21  -SP     LTG 1  Patient demonstrates PROM right shoulder flexion/scaption to >120 degrees  -SP      LTG 1 Progress  Partially Met  -SP     LTG 2  Patient demonstrates right shoulder AROM flexion to >100 degrees  -SP     LTG 2 Progress  Ongoing  -SP     LTG 3  Patient reports improved ability to complete light home and community ADLs with pain level 1-2/10  -SP     LTG 3 Progress  Progressing;Ongoing  -SP     LTG 4  Patient independent with HEP  -SP     LTG 4 Progress  Ongoing  -SP        Time Calculation    PT Goal Re-Cert Due Date  08/12/21  -SP       User Key  (r) = Recorded By, (t) = Taken By, (c) = Cosigned By    Initials Name Provider Type    Chantell Coley, PT Physical Therapist          PT Assessment/Plan     Row Name 07/13/21 1627          PT Assessment    Assessment Comments  Patient demonstrates progressing right shoulder ROM and slowly progressing strength.  Patient reports overall improvement in symptoms, but more recent increase in right anterior shoulder pain unrelated to any specific activity.  Patient continues to have significant right shoulder ROM and strength deficits.   Patient is making steady progress toward goals.  -SP        PT Plan    PT Frequency  1x/week;2x/week  -SP     Predicted Duration of Therapy Intervention (PT)  4 weeks  -SP     PT Plan Comments  Recommend continue 1-2x/week for 4 weeks to progress right shoulder ROM, strengthening and functional training  -SP       User Key  (r) = Recorded By, (t) = Taken By, (c) = Cosigned By    Initials Name Provider Type    Chantell Coley, PT Physical Therapist          Modalities     Row Name 07/13/21 1500             Moist Heat    MH Applied  Yes  -SP      Location  (R) shoulder- supine  -SP      PT Moist Heat Minutes  10  -SP      MH Prior to Rx  Yes  -SP         Ice    Ice Applied  Yes  -SP      Location  (R) shoulder-  pt supine  -SP      Ice S/P Rx  Yes  -SP         ELECTRICAL STIMULATION    Attended/Unattended  Unattended  -SP      Stimulation Type  IFC  -SP      Location/Electrode Placement/Other  right  "shoulder with ice with pt supine  -SP         Functional Testing    Outcome Measure Options  Quick DASH  -SP        User Key  (r) = Recorded By, (t) = Taken By, (c) = Cosigned By    Initials Name Provider Type    Chantell Coley, PT Physical Therapist        OP Exercises     Row Name 07/13/21 1500 07/13/21 1100          Subjective Comments    Subjective Comments  Patient reports that her shoulder is improving and she has been doing her exercises regularly at home.   However, patient reports that today she is having increased pain at anterior aspect of right shoulder and anterior aspect of arm.  Patient states that she is unsure of anything that she could have done that would have increased her pain.  Patient states \"I may have slept on it wrong\"  Patient states that she is able to dress upper and lower body, perform self care and light ADLs independently but with increased time required.  Patient states that she is able to reach into cabinets with assist of left (nonsurgical) arm.    -SP  --        Exercise 1    Exercise Name 1  --  Supine clasp hand flexion  -SP     Reps 1  --  15  -SP        Exercise 2    Exercise Name 2  --  Supine Cane Flexion & Scaption  -SP     Reps 2  --  15  -SP        Exercise 3    Exercise Name 3  --  Pulleys: Flexion & ABd  -SP     Time 3  --  3 min  -SP        Exercise 4    Exercise Name 4  --  Bicep Curl  -SP     Reps 4  --  20  -SP     Time 4  --  1#  -SP        Exercise 5    Exercise Name 5  --  TB: Rows & Ext  -SP     Reps 5  --  20  -SP     Time 5  --  Red  -SP        Exercise 6    Exercise Name 6  --  Supine AROM Flexion/ place/hold  -SP     Reps 6  --  15  -SP     Time 6  --  Assist to keep elbow extended  -SP        Exercise 7    Exercise Name 7  --  S/L ER  -SP     Reps 7  --  15  -SP        Exercise 8    Exercise Name 8  --  Wall slides: Flexion  -SP     Reps 8  --  5x  -SP        Exercise 9    Exercise Name 9  --  sidelying shoulder abduction  -SP     Reps 9  --  " 15  -SP       User Key  (r) = Recorded By, (t) = Taken By, (c) = Cosigned By    Initials Name Provider Type    Chantell Coley, PT Physical Therapist           Manual Rx (last 36 hours)      Manual Treatments     Row Name 07/13/21 1500             Manual Rx 1    Manual Rx 1 Location  right shoulder  -SP      Manual Rx 1 Type  PROM 10 x 10 sec- no internal rotation   -SP      Manual Rx 1 Duration  10 min  -SP        User Key  (r) = Recorded By, (t) = Taken By, (c) = Cosigned By    Initials Name Provider Type    Chantell Coley, PT Physical Therapist                      Outcome Measure Options: Quick DASH         Time Calculation:     Start Time: 1140  Stop Time: 1245  Time Calculation (min): 65 min  Untimed Charges  PT Moist Heat Minutes: 10  Total Minutes  Untimed Charges Total Minutes: 10   Total Minutes: 10     Therapy Charges for Today     Code Description Service Date Service Provider Modifiers Qty    27827663986 HC PT MANUAL THERAPY EA 15 MIN 7/13/2021 Chantell Denton, PT GP 1    21241038892 HC PT THER PROC EA 15 MIN 7/13/2021 Chantell Denton, PT GP 1          PT G-Codes  Outcome Measure Options: Quick DASH         Chantell Denton, PT  7/13/2021

## 2021-07-15 ENCOUNTER — HOSPITAL ENCOUNTER (OUTPATIENT)
Dept: PHYSICAL THERAPY | Facility: HOSPITAL | Age: 76
Setting detail: THERAPIES SERIES
Discharge: HOME | End: 2021-07-15

## 2021-07-15 DIAGNOSIS — Z96.611 S/P REVERSE TOTAL SHOULDER ARTHROPLASTY, RIGHT: Primary | ICD-10-CM

## 2021-07-15 PROCEDURE — 97140 MANUAL THERAPY 1/> REGIONS: CPT | Performed by: PHYSICAL THERAPIST

## 2021-07-15 PROCEDURE — 97110 THERAPEUTIC EXERCISES: CPT | Performed by: PHYSICAL THERAPIST

## 2021-07-15 NOTE — THERAPY TREATMENT NOTE
Outpatient Physical Therapy Ortho Treatment Note  EDWARD Ramirez     Patient Name: Britta Armijo  : 1945  MRN: [id number]  Today's Date: 7/15/2021      Visit Date: 07/15/2021    Visit Dx:    ICD-10-CM ICD-9-CM   1. 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)        Past Medical History:   Diagnosis Date   • Arthritis of back     NECK & LOWER BACK   • COVID-19 vaccine series completed    • Fracture, humerus    • Generalized anxiety disorder with panic attacks    • GERD (gastroesophageal reflux disease)    • Heart attack (CMS/HCC)     YEARS AGO   • Hyperlipidemia    • Hypertension    • Lumbosacral disc disease    • MRSA (methicillin resistant staph aureus) culture positive 2018    POSITIVE NASAL SWAB PRIOR TO HIP SURGERY   • Osteoporosis    • Right shoulder pain     SCHEDULED FOR TOTAL SHOULDER   • Unable to read or write     UNABLE TO READ        Past Surgical History:   Procedure Laterality Date   • APPENDECTOMY     • BREAST LUMPECTOMY Left     BENIGN   • BREAST SURGERY  2020   • CATARACT EXTRACTION, BILATERAL     • CHOLECYSTECTOMY OPEN     • EPIDURAL BLOCK     • HEMORRHOIDECTOMY      X4   • HIP SURGERY Right     Replacement   • HYSTERECTOMY     • SHOULDER MANIPULATION Right 2021    Procedure: REVERSE TOTAL SHOULDER MANIPULATION;  Surgeon: Derrick Pelayo MD;  Location: [facility] OR;  Service: Orthopedics;  Laterality: Right;   • TOTAL SHOULDER ARTHROPLASTY W/ DISTAL CLAVICLE EXCISION Right 3/22/2021    Procedure: TOTAL SHOULDER REVERSE ARTHROPLASTY;  Surgeon: Derrick Pelayo MD;  Location: [facility] OR;  Service: Orthopedics;  Laterality: Right;  TOTAL SHOULDER REVERSE  ARTHROPLASTY   • TUMOR REMOVAL  2017    RIGHT ARM        PT Ortho     Row Name 07/15/21 1057       Subjective Comments    Subjective Comments  Patient reports that the last two days she has had increased pain in anterior aspect of shoulder.  Patient does not relate any activity that could have increased her pain.  Patient reports that she has been doing exercises at home.   -SP      User Key  (r) = Recorded By, (t) = Taken By, (c) = Cosigned By    Initials Name Provider Type    Chantell Coley, PT Physical Therapist                      PT Assessment/Plan     Row Name 07/15/21 1637          PT Assessment    Assessment Comments  Patient wtih complaints of increased pain but improved tolerance for ther-ex today with progression.   Patient better able to hold shoulder at 90 degrees flexion in supine without assist  -SP        PT Plan    PT Plan Comments  Continue to progress right shoulder ROM and strengthening  -SP       User Key  (r) = Recorded By, (t) = Taken By, (c) = Cosigned By    Initials Name Provider Type    Chantell Coley, PT Physical Therapist          Modalities     Row Name 07/15/21 1057             Moist Heat    MH Applied  Yes  -SP      Location  (R) shoulder- supine  -SP      PT Moist Heat Minutes  10  -SP      MH Prior to Rx  Yes  -SP         Ice    Patient denies application of Ice  Yes  -SP      Patient reports will apply ice at home to involved area  Yes  -SP         ELECTRICAL STIMULATION    Attended/Unattended  Unattended  -SP      Stimulation Type  IFC  -SP      Location/Electrode Placement/Other  right shoulder with ice with pt supine  -SP         Functional Testing    Outcome Measure Options  Quick DASH  -SP        User Key  (r) = Recorded By, (t) = Taken By, (c) = Cosigned By    Initials Name Provider Type    Chantell Coley, PT Physical Therapist        OP Exercises     Row Name 07/15/21 1639 07/15/21 1057          Subjective Comments    Subjective  Comments  --  Patient reports that the last two days she has had increased pain in anterior aspect of shoulder.  Patient does not relate any activity that could have increased her pain.  Patient reports that she has been doing exercises at home.   -SP        Total Minutes    22250 - PT Therapeutic Exercise Minutes  15  -SP  --     11773 - PT Manual Therapy Minutes  10  -SP  --        Exercise 1    Exercise Name 1  --  Supine clasp hand flexion  -SP     Reps 1  --  15  -SP        Exercise 2    Exercise Name 2  --  Supine Cane Flexion & Scaption  -SP     Reps 2  --  15  -SP        Exercise 3    Exercise Name 3  --  Pulleys: Flexion & ABd  -SP     Time 3  --  3 min  -SP        Exercise 4    Exercise Name 4  --  Bicep Curl  -SP     Reps 4  --  20  -SP     Time 4  --  1#  -SP        Exercise 5    Exercise Name 5  --  TB: Rows & Ext  -SP     Reps 5  --  25  -SP     Time 5  --  Red  -SP        Exercise 6    Exercise Name 6  --  Supine AROM Flexion/ place/hold  -SP     Reps 6  --  10  -SP     Time 6  --  Assist to keep elbow extended  -SP        Exercise 7    Exercise Name 7  --  S/L ER  -SP     Reps 7  --  15  -SP        Exercise 8    Exercise Name 8  --  Wall slides: Flexion  -SP     Reps 8  --  5x  -SP        Exercise 9    Exercise Name 9  --  sidelying shoulder abduction  -SP     Reps 9  --  15  -SP        Exercise 10    Exercise Name 10  --  tband shoulder flexion  -SP     Cueing 10  --  Verbal;Demo  -SP     Reps 10  --  15  -SP     Time 10  --  yellow tband  -SP       User Key  (r) = Recorded By, (t) = Taken By, (c) = Cosigned By    Initials Name Provider Type    Chantell Coley, PT Physical Therapist                      Manual Rx (last 36 hours)      Manual Treatments     Row Name 07/15/21 1639 07/15/21 1047          Total Minutes    91835 - PT Manual Therapy Minutes  10  -SP  --        Manual Rx 1    Manual Rx 1 Location  --  right shoulder  -SP     Manual Rx 1 Type  --  PROM 10 x 10 sec- no internal  rotation   -SP     Manual Rx 1 Duration  --  10 min  -SP       User Key  (r) = Recorded By, (t) = Taken By, (c) = Cosigned By    Initials Name Provider Type    Chantell Coley, PT Physical Therapist              Therapy Education  Given: HEP  Program: Progressed  How Provided: Verbal, Written  Provided to: Patient  Level of Understanding: Verbalized, Demonstrated    Outcome Measure Options: Quick DASH         Time Calculation:   Start Time: 1047  Stop Time: 1200  Time Calculation (min): 73 min  Timed Charges  91140 - PT Therapeutic Exercise Minutes: 15  99834 - PT Manual Therapy Minutes: 10  Untimed Charges  PT Moist Heat Minutes: 10  Total Minutes  Timed Charges Total Minutes: 25  Untimed Charges Total Minutes: 10   Total Minutes: 25  Therapy Charges for Today     Code Description Service Date Service Provider Modifiers Qty    86721788442 HC PT THER PROC EA 15 MIN 7/15/2021 Chantell Denton, PT GP 1    85374106160 HC PT MANUAL THERAPY EA 15 MIN 7/15/2021 Chantell Denton, PT GP 1          PT G-Codes  Outcome Measure Options: Quick DASH         Chantell Denton, PT  7/15/2021

## 2021-07-20 ENCOUNTER — HOSPITAL ENCOUNTER (OUTPATIENT)
Dept: PHYSICAL THERAPY | Facility: HOSPITAL | Age: 76
Setting detail: THERAPIES SERIES
Discharge: HOME | End: 2021-07-20

## 2021-07-20 DIAGNOSIS — Z96.611 S/P REVERSE TOTAL SHOULDER ARTHROPLASTY, RIGHT: Primary | ICD-10-CM

## 2021-07-20 PROCEDURE — 97140 MANUAL THERAPY 1/> REGIONS: CPT | Performed by: PHYSICAL THERAPIST

## 2021-07-20 PROCEDURE — 97110 THERAPEUTIC EXERCISES: CPT | Performed by: PHYSICAL THERAPIST

## 2021-07-20 NOTE — THERAPY TREATMENT NOTE
Outpatient Physical Therapy Ortho Treatment Note  EDWARD Ramirez     Patient Name: Britta Armijo  : 1945  MRN: [id number]  Today's Date: 2021      Visit Date: 2021    Visit Dx:    ICD-10-CM ICD-9-CM   1. 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)        Past Medical History:   Diagnosis Date   • Arthritis of back     NECK & LOWER BACK   • COVID-19 vaccine series completed    • Fracture, humerus    • Generalized anxiety disorder with panic attacks    • GERD (gastroesophageal reflux disease)    • Heart attack (CMS/HCC)     YEARS AGO   • Hyperlipidemia    • Hypertension    • Lumbosacral disc disease    • MRSA (methicillin resistant staph aureus) culture positive 2018    POSITIVE NASAL SWAB PRIOR TO HIP SURGERY   • Osteoporosis    • Right shoulder pain     SCHEDULED FOR TOTAL SHOULDER   • Unable to read or write     UNABLE TO READ        Past Surgical History:   Procedure Laterality Date   • APPENDECTOMY     • BREAST LUMPECTOMY Left     BENIGN   • BREAST SURGERY  2020   • CATARACT EXTRACTION, BILATERAL     • CHOLECYSTECTOMY OPEN     • EPIDURAL BLOCK     • HEMORRHOIDECTOMY      X4   • HIP SURGERY Right     Replacement   • HYSTERECTOMY     • SHOULDER MANIPULATION Right 2021    Procedure: REVERSE TOTAL SHOULDER MANIPULATION;  Surgeon: Derrick Pelayo MD;  Location: [facility] OR;  Service: Orthopedics;  Laterality: Right;   • TOTAL SHOULDER ARTHROPLASTY W/ DISTAL CLAVICLE EXCISION Right 3/22/2021    Procedure: TOTAL SHOULDER REVERSE ARTHROPLASTY;  Surgeon: Derrick Pelayo MD;  Location: [facility] OR;  Service: Orthopedics;  Laterality: Right;  TOTAL SHOULDER REVERSE  ARTHROPLASTY   • TUMOR REMOVAL  2017    RIGHT ARM        PT Ortho     Row Name 07/20/21 0953       Subjective Comments    Subjective Comments  Patient reports that she continues to have soreness in anterior shoulder area  -SP      User Key  (r) = Recorded By, (t) = Taken By, (c) = Cosigned By    Initials Name Provider Type    Chantell Coley, PT Physical Therapist                      PT Assessment/Plan     Row Name 07/20/21 1357 07/20/21 1356       PT Assessment    Assessment Comments  --  Patient with improved tolerance for PROM and tolerates progression of strengthening exercises well  -SP       PT Plan    PT Plan Comments  Continue to progress as tolerable  -SP  --      User Key  (r) = Recorded By, (t) = Taken By, (c) = Cosigned By    Initials Name Provider Type    Chantell Coley, PT Physical Therapist          Modalities     Row Name 07/20/21 0953             Moist Heat    MH Applied  Yes  -SP      Location  (R) shoulder- supine  -SP      PT Moist Heat Minutes  10  -SP      MH Prior to Rx  Yes  -SP         Ice    Ice Applied  Yes  -SP      Location  (R) shoulder-  pt supine  -SP      PT Ice Rx Minutes  15  -SP      Ice S/P Rx  Yes  -SP      Patient denies application of Ice  --  -SP      Patient reports will apply ice at home to involved area  --  -SP         ELECTRICAL STIMULATION    Attended/Unattended  --  -SP      Stimulation Type  --  -SP      Location/Electrode Placement/Other  --  -SP         Functional Testing    Outcome Measure Options  Quick DASH  -SP        User Key  (r) = Recorded By, (t) = Taken By, (c) = Cosigned By    Initials Name Provider Type    Chantell Coley, PT Physical Therapist        OP Exercises     Row Name 07/20/21 1357 07/20/21 0953          Subjective Comments    Subjective Comments  --  Patient reports that she continues to have soreness in anterior shoulder area  -SP        Total Minutes    51974 - PT Therapeutic Exercise Minutes  15  -SP  --      92608 - PT Manual Therapy Minutes  15  -SP  --        Exercise 1    Exercise Name 1  --  Supine clasp hand flexion  -SP     Reps 1  --  15  -SP        Exercise 2    Exercise Name 2  --  Supine Cane Flexion & Scaption  -SP     Reps 2  --  15  -SP        Exercise 3    Exercise Name 3  --  Pulleys: Flexion & ABd  -SP     Time 3  --  3 min  -SP        Exercise 4    Exercise Name 4  --  Bicep Curl  -SP     Reps 4  --  25  -SP     Time 4  --  1#  -SP        Exercise 5    Exercise Name 5  --  TB: Rows & Ext  -SP     Reps 5  --  25  -SP     Time 5  --  green  -SP        Exercise 6    Exercise Name 6  --  Supine AROM Flexion/ place/hold  -SP     Reps 6  --  10  -SP     Time 6  --  Assist to keep elbow extended  -SP        Exercise 7    Exercise Name 7  --  S/L ER  -SP     Reps 7  --  15  -SP        Exercise 8    Exercise Name 8  --  Wall slides: Flexion  -SP     Reps 8  --  5x  -SP        Exercise 9    Exercise Name 9  --  sidelying shoulder abduction  -SP     Reps 9  --  15  -SP        Exercise 10    Exercise Name 10  --  tband shoulder flexion  -SP     Cueing 10  --  Verbal;Demo  -SP     Reps 10  --  15  -SP     Time 10  --  yellow tband  -SP       User Key  (r) = Recorded By, (t) = Taken By, (c) = Cosigned By    Initials Name Provider Type    Chantell Coley, TABITHA Physical Therapist                      Manual Rx (last 36 hours)      Manual Treatments     Row Name 07/20/21 1357 07/20/21 0953          Total Minutes    40410 - PT Manual Therapy Minutes  15  -SP  --        Manual Rx 1    Manual Rx 1 Location  --  right shoulder  -SP     Manual Rx 1 Type  --  PROM 10 x 10 sec- no internal rotation   -SP     Manual Rx 1 Duration  --  10 min  -SP       User Key  (r) = Recorded By, (t) = Taken By, (c) = Cosigned By    Initials Name Provider Type    Chantell Coley PT Physical Therapist              Therapy Education  Given: HEP, Symptoms/condition management  Program: Reinforced  How Provided:  Verbal  Provided to: Patient  Level of Understanding: Verbalized, Demonstrated    Outcome Measure Options: Quick DASH         Time Calculation:   Start Time: 0953  Stop Time: 1100  Time Calculation (min): 67 min  Timed Charges  55717 - PT Therapeutic Exercise Minutes: 15  48289 - PT Manual Therapy Minutes: 15  Untimed Charges  PT Moist Heat Minutes: 10  PT Ice Rx Minutes: 15  Total Minutes  Timed Charges Total Minutes: 30  Untimed Charges Total Minutes: 25   Total Minutes: 30  Therapy Charges for Today     Code Description Service Date Service Provider Modifiers Qty    75434220679 HC PT THER PROC EA 15 MIN 7/20/2021 Chantell Denton, PT GP 1    [id number] HC PT MANUAL THERAPY EA 15 MIN 7/20/2021 Chantell Denton, PT GP 1          PT G-Codes  Outcome Measure Options: Michael Denton, PT  7/20/2021

## 2021-07-22 ENCOUNTER — HOSPITAL ENCOUNTER (OUTPATIENT)
Dept: PHYSICAL THERAPY | Facility: HOSPITAL | Age: 76
Setting detail: THERAPIES SERIES
Discharge: HOME | End: 2021-07-22

## 2021-07-22 DIAGNOSIS — Z96.611 S/P REVERSE TOTAL SHOULDER ARTHROPLASTY, RIGHT: Primary | ICD-10-CM

## 2021-07-22 PROCEDURE — 97110 THERAPEUTIC EXERCISES: CPT | Performed by: PHYSICAL THERAPIST

## 2021-07-22 PROCEDURE — 97140 MANUAL THERAPY 1/> REGIONS: CPT | Performed by: PHYSICAL THERAPIST

## 2021-07-27 ENCOUNTER — HOSPITAL ENCOUNTER (OUTPATIENT)
Dept: PHYSICAL THERAPY | Facility: HOSPITAL | Age: 76
Setting detail: THERAPIES SERIES
Discharge: HOME | End: 2021-07-27

## 2021-07-27 DIAGNOSIS — Z96.611 S/P REVERSE TOTAL SHOULDER ARTHROPLASTY, RIGHT: Primary | ICD-10-CM

## 2021-07-27 PROCEDURE — 97110 THERAPEUTIC EXERCISES: CPT

## 2021-07-27 PROCEDURE — 97140 MANUAL THERAPY 1/> REGIONS: CPT

## 2021-07-27 NOTE — THERAPY TREATMENT NOTE
Outpatient Physical Therapy Ortho Treatment Note  EDWARD Ramirez     Patient Name: Britta Armijo  : 1945  MRN: [id number]  Today's Date: 2021      Visit Date: 2021    Visit Dx:    ICD-10-CM ICD-9-CM   1. 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)        Past Medical History:   Diagnosis Date   • Arthritis of back     NECK & LOWER BACK   • COVID-19 vaccine series completed    • Fracture, humerus    • Generalized anxiety disorder with panic attacks    • GERD (gastroesophageal reflux disease)    • Heart attack (CMS/HCC)     YEARS AGO   • Hyperlipidemia    • Hypertension    • Lumbosacral disc disease    • MRSA (methicillin resistant staph aureus) culture positive 2018    POSITIVE NASAL SWAB PRIOR TO HIP SURGERY   • Osteoporosis    • Right shoulder pain     SCHEDULED FOR TOTAL SHOULDER   • Unable to read or write     UNABLE TO READ        Past Surgical History:   Procedure Laterality Date   • APPENDECTOMY     • BREAST LUMPECTOMY Left     BENIGN   • BREAST SURGERY  2020   • CATARACT EXTRACTION, BILATERAL     • CHOLECYSTECTOMY OPEN     • EPIDURAL BLOCK     • HEMORRHOIDECTOMY      X4   • HIP SURGERY Right     Replacement   • HYSTERECTOMY     • SHOULDER MANIPULATION Right 2021    Procedure: REVERSE TOTAL SHOULDER MANIPULATION;  Surgeon: Derrick Pelayo MD;  Location: [facility] OR;  Service: Orthopedics;  Laterality: Right;   • TOTAL SHOULDER ARTHROPLASTY W/ DISTAL CLAVICLE EXCISION Right 3/22/2021    Procedure: TOTAL SHOULDER REVERSE ARTHROPLASTY;  Surgeon: Derrick Pelayo MD;  Location: [facility] OR;  Service: Orthopedics;  Laterality: Right;  TOTAL SHOULDER REVERSE  ARTHROPLASTY   • TUMOR REMOVAL  2017    RIGHT ARM        PT Ortho     Row Name 07/27/21 0900       Subjective Comments    Subjective Comments  pt states her arm is really sore from doing her HEP; states she decreased to daily from 2x/day as of yesteday  -       Subjective Pain    Able to rate subjective pain?  yes  -    Pre-Treatment Pain Level  5  -    Subjective Pain Comment  pain  past couple of days was 8/10  -      User Key  (r) = Recorded By, (t) = Taken By, (c) = Cosigned By    Initials Name Provider Type     Gatito Christie PTA Physical Therapy Assistant                      PT Assessment/Plan     Row Name 07/27/21 1048          PT Assessment    Assessment Comments  pt reporting increased pain and soreness past couple of days that she attributes to her HEP - decreased to daily yesterday; pt tolerated PROM well but continues to have pain and deficits with AROM  -        PT Plan    PT Plan Comments  Cont per POC, progressing as tolerated  -       User Key  (r) = Recorded By, (t) = Taken By, (c) = Cosigned By    Initials Name Provider Type     Gatito Christie PTA Physical Therapy Assistant          Modalities     Row Name 07/27/21 0900             Moist Heat    Location  (R) shoulder- supine w/ bolster under knees and towel roll under elbow  -      PT Moist Heat Minutes  10  -MH      MH Prior to Rx  Yes  -         Ice    Location  (R) shoulder- supine w/ bolster under knees and towel roll under elbow  -      PT Ice Rx Minutes  10  -MH      Ice S/P Rx  Yes  -        User Key  (r) = Recorded By, (t) = Taken By, (c) = Cosigned By    Initials Name Provider Type     Gatito Christie PTA Physical Therapy Assistant        OP Exercises     Row Name 07/27/21 1054 07/27/21 0900          Subjective Comments    Subjective Comments  --  pt states her arm is really sore from doing her HEP; states she decreased to daily from 2x/day as of yesteday  -        Subjective Pain    Able to rate  subjective pain?  --  yes  -     Pre-Treatment Pain Level  --  5  -     Subjective Pain Comment  --  pain yesterday past couple of days was 8/10  -        Total Minutes    39090 - PT Therapeutic Exercise Minutes  15  -MH  --     86117 - PT Manual Therapy Minutes  15  -MH  --        Exercise 1    Exercise Name 1  --  Supine clasp hand flexion  -     Cueing 1  --  Verbal  -     Reps 1  --  20  -MH        Exercise 2    Exercise Name 2  --  Supine Cane Flexion  -     Cueing 2  --  Verbal  -     Reps 2  --  20  -MH        Exercise 3    Exercise Name 3  --  Pulleys: Flexion  -     Cueing 3  --  Verbal;Tactile  -     Time 3  --  3 min  -        Exercise 4    Exercise Name 4  --  Bicep Curl  -     Cueing 4  --  Verbal  -     Reps 4  --  20  -     Time 4  --  1#  -        Exercise 5    Exercise Name 5  --  TB: Rows & Ext  -     Cueing 5  --  Verbal;Tactile;Demo  -     Reps 5  --  30  -     Time 5  --  green  -        Exercise 6    Exercise Name 6  --  Supine AROM Flexion/ place/hold  -     Cueing 6  --  Verbal;Tactile  -     Reps 6  --  10  -MH     Time 6  --  Assist to keep elbow extended  -        Exercise 7    Exercise Name 7  --  S/L ER  -     Cueing 7  --  Tactile;Verbal;Demo  -     Reps 7  --  15  -MH        Exercise 8    Exercise Name 8  --  Wall slides: Flexion  -     Cueing 8  --  Verbal  -     Reps 8  --  5x  -        Exercise 9    Exercise Name 9  --  sidelying shoulder abduction  -     Additional Comments  --  Attempted but too painful and exercise held today  -        Exercise 10    Exercise Name 10  --  tband shoulder flexion  -     Cueing 10  --  Verbal;Demo  -     Reps 10  --  30  -MH     Time 10  --  yellow tband  -       User Key  (r) = Recorded By, (t) = Taken By, (c) = Cosigned By    Initials Name Provider Type    Gatito Fairbanks, PTA Physical Therapy Assistant                      Manual Rx (last 36 hours)      Manual Treatments     Row  Name 07/27/21 1054 07/27/21 0900          Total Minutes    16172 - PT Manual Therapy Minutes  15  -MH  --        Manual Rx 1    Manual Rx 1 Location  --  right shoulder  -     Manual Rx 1 Type  --  PROM: flex, abd and ER   -     Manual Rx 1 Grade  --  NO INTERNAL ROTATION  -     Manual Rx 1 Duration  --  10 reps  -       User Key  (r) = Recorded By, (t) = Taken By, (c) = Cosigned By    Initials Name Provider Type     Gatito Christie PTA Physical Therapy Assistant              Therapy Education  Given: HEP, Symptoms/condition management  Program: Reinforced  How Provided: Verbal, Demonstration  Provided to: Patient  Level of Understanding: Teach back education performed, Verbalized, Demonstrated              Time Calculation:   Start Time: 0915  Stop Time: 1016  Time Calculation (min): 61 min  Timed Charges  35008 - PT Therapeutic Exercise Minutes: 15  48221 - PT Manual Therapy Minutes: 15  Untimed Charges  PT Moist Heat Minutes: 10  PT Ice Rx Minutes: 10  Total Minutes  Timed Charges Total Minutes: 30  Untimed Charges Total Minutes: 20   Total Minutes: 50  Therapy Charges for Today     Code Description Service Date Service Provider Modifiers Qty    84906007745 HC PT THER PROC EA 15 MIN 7/27/2021 Gatito Christie PTA GP 1    15509357298 HC PT MANUAL THERAPY EA 15 MIN 7/27/2021 Gatito Christie PTA GP 1                    Gatito Christie PTA  7/27/2021

## 2021-07-30 ENCOUNTER — APPOINTMENT (OUTPATIENT)
Dept: PHYSICAL THERAPY | Facility: HOSPITAL | Age: 76
End: 2021-07-30

## 2021-08-02 ENCOUNTER — TRANSCRIBE ORDERS (OUTPATIENT)
Dept: ADMINISTRATIVE | Facility: HOSPITAL | Age: 76
End: 2021-08-02

## 2021-08-02 DIAGNOSIS — R06.02 SHORTNESS OF BREATH ON EXERTION: Primary | ICD-10-CM

## 2021-08-03 ENCOUNTER — TRANSCRIBE ORDERS (OUTPATIENT)
Dept: ADMINISTRATIVE | Facility: HOSPITAL | Age: 76
End: 2021-08-03

## 2021-08-03 ENCOUNTER — APPOINTMENT (OUTPATIENT)
Dept: PHYSICAL THERAPY | Facility: HOSPITAL | Age: 76
End: 2021-08-03

## 2021-08-03 DIAGNOSIS — U07.1 COVID-19: Primary | ICD-10-CM

## 2021-08-04 ENCOUNTER — HOSPITAL ENCOUNTER (OUTPATIENT)
Dept: PHYSICAL THERAPY | Facility: HOSPITAL | Age: 76
Setting detail: THERAPIES SERIES
Discharge: HOME | End: 2021-08-04

## 2021-08-04 ENCOUNTER — TRANSCRIBE ORDERS (OUTPATIENT)
Dept: ADMINISTRATIVE | Facility: HOSPITAL | Age: 76
End: 2021-08-04

## 2021-08-04 DIAGNOSIS — Z78.0 MENOPAUSE: ICD-10-CM

## 2021-08-04 DIAGNOSIS — Z96.611 S/P REVERSE TOTAL SHOULDER ARTHROPLASTY, RIGHT: Primary | ICD-10-CM

## 2021-08-04 DIAGNOSIS — Z12.31 VISIT FOR SCREENING MAMMOGRAM: Primary | ICD-10-CM

## 2021-08-04 PROCEDURE — 97140 MANUAL THERAPY 1/> REGIONS: CPT

## 2021-08-04 PROCEDURE — 97110 THERAPEUTIC EXERCISES: CPT

## 2021-08-04 NOTE — THERAPY TREATMENT NOTE
Outpatient Physical Therapy Ortho Treatment Note  EDWARD Ramirez     Patient Name: Britta Armijo  : 1945  MRN: [id number]  Today's Date: 2021      Visit Date: 2021    Visit Dx:    ICD-10-CM ICD-9-CM   1. 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)        Past Medical History:   Diagnosis Date   • Arthritis of back     NECK & LOWER BACK   • COVID-19 vaccine series completed    • Fracture, humerus    • Generalized anxiety disorder with panic attacks    • GERD (gastroesophageal reflux disease)    • Heart attack (CMS/HCC)     YEARS AGO   • Hyperlipidemia    • Hypertension    • Lumbosacral disc disease    • MRSA (methicillin resistant staph aureus) culture positive 2018    POSITIVE NASAL SWAB PRIOR TO HIP SURGERY   • Osteoporosis    • Right shoulder pain     SCHEDULED FOR TOTAL SHOULDER   • Unable to read or write     UNABLE TO READ        Past Surgical History:   Procedure Laterality Date   • APPENDECTOMY     • BREAST LUMPECTOMY Left     BENIGN   • BREAST SURGERY  2020   • CATARACT EXTRACTION, BILATERAL     • CHOLECYSTECTOMY OPEN     • EPIDURAL BLOCK     • HEMORRHOIDECTOMY      X4   • HIP SURGERY Right     Replacement   • HYSTERECTOMY     • SHOULDER MANIPULATION Right 2021    Procedure: REVERSE TOTAL SHOULDER MANIPULATION;  Surgeon: Derrick Pelayo MD;  Location: [facility] OR;  Service: Orthopedics;  Laterality: Right;   • TOTAL SHOULDER ARTHROPLASTY W/ DISTAL CLAVICLE EXCISION Right 3/22/2021    Procedure: TOTAL SHOULDER REVERSE ARTHROPLASTY;  Surgeon: Derrick Pelayo MD;  Location: [facility] OR;  Service: Orthopedics;  Laterality: Right;  TOTAL SHOULDER REVERSE  "ARTHROPLASTY   • TUMOR REMOVAL  2017    RIGHT ARM                        PT Assessment/Plan     Row Name 08/04/21 0940          PT Assessment    Assessment Comments  Pt has slowly improved with passive motion; very limited active motion in all planes completed in supine and standing.   -KM        PT Plan    PT Plan Comments  Continue to progress as tolerated.  -KM       User Key  (r) = Recorded By, (t) = Taken By, (c) = Cosigned By    Initials Name Provider Type    Kailey aBrrientos PTA Physical Therapy Assistant          Modalities     Row Name 08/04/21 0940             Moist Heat    Location  (R) shoulder- supine w/ bolster under knees and towel roll under elbow  -KM      PT Moist Heat Minutes  10  -KM      MH Prior to Rx  Yes  -KM         Ice    Location  (R) shoulder- supine w/ bolster under knees and towel roll under elbow  -KM      PT Ice Rx Minutes  10  -KM      Ice S/P Rx  Yes  -KM        User Key  (r) = Recorded By, (t) = Taken By, (c) = Cosigned By    Initials Name Provider Type    Kailey Barrientos PTA Physical Therapy Assistant        OP Exercises     Row Name 08/04/21 0940             Subjective Comments    Subjective Comments  Pt states her shoulder felt like \"it had a fever\" last week.   -KM         Exercise 1    Exercise Name 1  Supine clasp hand flexion  -KM      Cueing 1  Verbal  -KM      Reps 1  20  -KM         Exercise 2    Exercise Name 2  Supine Cane Flexion  -KM      Cueing 2  Verbal  -KM      Reps 2  20  -KM         Exercise 3    Exercise Name 3  Pulleys: Flexion  -KM      Cueing 3  Verbal;Tactile  -KM      Time 3  3 min  -KM         Exercise 4    Exercise Name 4  Bicep Curl  -KM      Cueing 4  Verbal  -KM      Reps 4  20  -KM      Time 4  1#  -KM         Exercise 5    Exercise Name 5  TB: Rows & Ext  -KM      Cueing 5  Verbal;Tactile;Demo  -KM      Reps 5  30  -KM      Time 5  green  -KM         Exercise 6    Exercise Name 6  Supine AROM Flexion/ place/hold  -KM      Cueing 6  " Verbal;Tactile  -KM      Reps 6  10  -KM      Time 6  Assist to keep elbow extended  -KM         Exercise 7    Exercise Name 7  S/L ER  -KM      Cueing 7  Tactile;Verbal;Demo  -KM      Reps 7  --  -KM         Exercise 8    Exercise Name 8  Wall slides: Flexion  -KM      Cueing 8  Verbal  -KM      Reps 8  5x  -KM         Exercise 9    Exercise Name 9  sidelying shoulder abduction  -KM         Exercise 10    Exercise Name 10  tband shoulder flexion  -KM      Cueing 10  Verbal;Demo  -KM      Reps 10  30  -KM      Time 10  yellow tband  -KM         Exercise 11    Exercise Name 11  Isometrics 3- way  -KM      Reps 11  15 each  -KM      Time 11  flexion/Abd/add  -KM        User Key  (r) = Recorded By, (t) = Taken By, (c) = Cosigned By    Initials Name Provider Type    Kailey Barrientos PTA Physical Therapy Assistant                      Manual Rx (last 36 hours)      Manual Treatments     Row Name 08/04/21 0940             Manual Rx 1    Manual Rx 1 Location  right shoulder  -KM      Manual Rx 1 Type  PROM: flex, abd and ER   -KM      Manual Rx 1 Grade  NO INTERNAL ROTATION  -KM      Manual Rx 1 Duration  10 reps  -KM        User Key  (r) = Recorded By, (t) = Taken By, (c) = Cosigned By    Initials Name Provider Type    Kailey Barrientos PTA Physical Therapy Assistant                             Time Calculation:   Start Time: 0940  Stop Time: 1030  Time Calculation (min): 50 min  Untimed Charges  PT Moist Heat Minutes: 10  PT Ice Rx Minutes: 10  Total Minutes  Untimed Charges Total Minutes: 20   Total Minutes: 20  Therapy Charges for Today     Code Description Service Date Service Provider Modifiers Qty    21798296231 HC PT MANUAL THERAPY EA 15 MIN 8/4/2021 Kailey Mckenzie PTA GP 1    25725686810 HC PT THER PROC EA 15 MIN 8/4/2021 Kailey Mckenzie PTA GP 1                    Kailey Mckenzie PTA  8/4/2021

## 2021-08-06 ENCOUNTER — HOSPITAL ENCOUNTER (OUTPATIENT)
Dept: PHYSICAL THERAPY | Facility: HOSPITAL | Age: 76
Setting detail: THERAPIES SERIES
Discharge: HOME | End: 2021-08-06

## 2021-08-06 DIAGNOSIS — Z96.611 S/P REVERSE TOTAL SHOULDER ARTHROPLASTY, RIGHT: Primary | ICD-10-CM

## 2021-08-06 PROCEDURE — 97140 MANUAL THERAPY 1/> REGIONS: CPT

## 2021-08-06 PROCEDURE — 97110 THERAPEUTIC EXERCISES: CPT

## 2021-08-06 NOTE — THERAPY TREATMENT NOTE
Outpatient Physical Therapy Ortho Treatment Note  EDWARD Ramirez     Patient Name: Britta Armijo  : 1945  MRN: [id number]  Today's Date: 2021      Visit Date: 2021    Visit Dx:    ICD-10-CM ICD-9-CM   1. 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)        Past Medical History:   Diagnosis Date   • Arthritis of back     NECK & LOWER BACK   • COVID-19 vaccine series completed    • Fracture, humerus    • Generalized anxiety disorder with panic attacks    • GERD (gastroesophageal reflux disease)    • Heart attack (CMS/HCC)     YEARS AGO   • Hyperlipidemia    • Hypertension    • Lumbosacral disc disease    • MRSA (methicillin resistant staph aureus) culture positive 2018    POSITIVE NASAL SWAB PRIOR TO HIP SURGERY   • Osteoporosis    • Right shoulder pain     SCHEDULED FOR TOTAL SHOULDER   • Unable to read or write     UNABLE TO READ        Past Surgical History:   Procedure Laterality Date   • APPENDECTOMY     • BREAST LUMPECTOMY Left     BENIGN   • BREAST SURGERY  2020   • CATARACT EXTRACTION, BILATERAL     • CHOLECYSTECTOMY OPEN     • EPIDURAL BLOCK     • HEMORRHOIDECTOMY      X4   • HIP SURGERY Right     Replacement   • HYSTERECTOMY     • SHOULDER MANIPULATION Right 2021    Procedure: REVERSE TOTAL SHOULDER MANIPULATION;  Surgeon: Derrick Pealyo MD;  Location: [facility] OR;  Service: Orthopedics;  Laterality: Right;   • TOTAL SHOULDER ARTHROPLASTY W/ DISTAL CLAVICLE EXCISION Right 3/22/2021    Procedure: TOTAL SHOULDER REVERSE ARTHROPLASTY;  Surgeon: Derrick Pelayo MD;  Location: [facility] OR;  Service: Orthopedics;  Laterality: Right;  TOTAL SHOULDER REVERSE  ARTHROPLASTY   • TUMOR REMOVAL  2017    RIGHT ARM                        PT Assessment/Plan     Row Name 08/06/21 1030          PT Assessment    Assessment Comments  Continue to attempt progression or ROM and strength with limited tolerance.  -KM        PT Plan    PT Plan Comments  Continue per POC   -KM       User Key  (r) = Recorded By, (t) = Taken By, (c) = Cosigned By    Initials Name Provider Type    Kailey Barrientos PTA Physical Therapy Assistant          Modalities     Row Name 08/06/21 1030             Moist Heat    Location  (R) shoulder- supine w/ bolster under knees and towel roll under elbow  -KM      PT Moist Heat Minutes  10  -KM      MH Prior to Rx  Yes  -KM         Ice    Location  (R) shoulder- supine w/ bolster under knees and towel roll under elbow  -KM      PT Ice Rx Minutes  10  -KM      Ice S/P Rx  Yes  -KM        User Key  (r) = Recorded By, (t) = Taken By, (c) = Cosigned By    Initials Name Provider Type    Kailey Barrientos PTA Physical Therapy Assistant        OP Exercises     Row Name 08/06/21 1030             Subjective Comments    Subjective Comments  Pt states she has some pain anterior shoulder. She has an MD appointment next week.   -KM         Exercise 1    Exercise Name 1  Supine clasp hand flexion  -KM      Cueing 1  Verbal  -KM      Reps 1  20  -KM         Exercise 2    Exercise Name 2  Supine Cane Flexion  -KM      Cueing 2  Verbal  -KM      Reps 2  20  -KM         Exercise 3    Exercise Name 3  Pulleys: Flexion  -KM      Cueing 3  Verbal;Tactile  -KM      Time 3  3 min  -KM         Exercise 4    Exercise Name 4  Bicep Curl  -KM      Cueing 4  Verbal  -KM      Reps 4  20  -KM      Time 4  1#  -KM         Exercise 5    Exercise Name 5  TB: Rows & Ext  -KM      Cueing 5  Verbal;Tactile;Demo  -KM      Reps 5  25  -KM      Time 5  blue  -KM         Exercise 6    Exercise Name 6  Sidelying Flexion  -KM      Cueing 6  Verbal;Tactile  -KM      Reps 6  15  -KM      Time 6  Assist to  keep elbow extended  -KM         Exercise 7    Exercise Name 7  S/L ER  -KM      Cueing 7  Tactile;Verbal;Demo  -KM      Reps 7  15  -KM         Exercise 8    Exercise Name 8  Wall slides: Flexion  -KM      Cueing 8  Verbal  -KM      Reps 8  5x  -KM         Exercise 9    Exercise Name 9  sidelying shoulder abduction  -KM         Exercise 10    Exercise Name 10  tband shoulder flexion  -KM      Cueing 10  Verbal;Demo  -KM      Reps 10  30  -KM      Time 10  yellow tband  -KM         Exercise 11    Exercise Name 11  Isometrics 3- way  -KM        User Key  (r) = Recorded By, (t) = Taken By, (c) = Cosigned By    Initials Name Provider Type    Kailey Barrientos PTA Physical Therapy Assistant                      Manual Rx (last 36 hours)      Manual Treatments     Row Name 08/06/21 1030             Manual Rx 1    Manual Rx 1 Location  right shoulder  -KM      Manual Rx 1 Type  PROM: flex, abd and ER   -KM      Manual Rx 1 Grade  NO INTERNAL ROTATION  -KM      Manual Rx 1 Duration  10 reps  -KM        User Key  (r) = Recorded By, (t) = Taken By, (c) = Cosigned By    Initials Name Provider Type    Kailey Barrientos PTA Physical Therapy Assistant                             Time Calculation:   Start Time: 1030  Stop Time: 1115  Time Calculation (min): 45 min  Untimed Charges  PT Moist Heat Minutes: 10  PT Ice Rx Minutes: 10  Total Minutes  Untimed Charges Total Minutes: 20   Total Minutes: 20  Therapy Charges for Today     Code Description Service Date Service Provider Modifiers Qty    70175582800 HC PT MANUAL THERAPY EA 15 MIN 8/6/2021 Kailey Mckenzie PTA GP 1    24573015682 HC PT THER PROC EA 15 MIN 8/6/2021 Kailey Mckenzie PTA GP 1                    Kailey Mckenzie PTA  8/6/2021

## 2021-08-11 ENCOUNTER — APPOINTMENT (OUTPATIENT)
Dept: PHYSICAL THERAPY | Facility: HOSPITAL | Age: 76
End: 2021-08-11

## 2021-08-18 ENCOUNTER — PRE-ADMISSION TESTING (OUTPATIENT)
Dept: PREADMISSION TESTING | Facility: HOSPITAL | Age: 76
End: 2021-08-18

## 2021-08-18 LAB
ANION GAP SERPL CALCULATED.3IONS-SCNC: 8.4 MMOL/L (ref 5–15)
BUN SERPL-MCNC: 18 MG/DL (ref 8–23)
BUN/CREAT SERPL: 12.6 (ref 7–25)
CALCIUM SPEC-SCNC: 9.7 MG/DL (ref 8.6–10.5)
CHLORIDE SERPL-SCNC: 96 MMOL/L (ref 98–107)
CO2 SERPL-SCNC: 30.6 MMOL/L (ref 22–29)
CREAT SERPL-MCNC: 1.43 MG/DL (ref 0.57–1)
DEPRECATED RDW RBC AUTO: 47.6 FL (ref 37–54)
ERYTHROCYTE [DISTWIDTH] IN BLOOD BY AUTOMATED COUNT: 14.1 % (ref 12.3–15.4)
GFR SERPL CREATININE-BSD FRML MDRD: 36 ML/MIN/1.73
GLUCOSE SERPL-MCNC: 161 MG/DL (ref 65–99)
HCT VFR BLD AUTO: 36.5 % (ref 34–46.6)
HGB BLD-MCNC: 11.5 G/DL (ref 12–15.9)
MCH RBC QN AUTO: 29.5 PG (ref 26.6–33)
MCHC RBC AUTO-ENTMCNC: 31.5 G/DL (ref 31.5–35.7)
MCV RBC AUTO: 93.6 FL (ref 79–97)
PLATELET # BLD AUTO: 403 10*3/MM3 (ref 140–450)
PMV BLD AUTO: 9.7 FL (ref 6–12)
POTASSIUM SERPL-SCNC: 3.8 MMOL/L (ref 3.5–5.2)
RBC # BLD AUTO: 3.9 10*6/MM3 (ref 3.77–5.28)
SODIUM SERPL-SCNC: 135 MMOL/L (ref 136–145)
WBC # BLD AUTO: 8.9 10*3/MM3 (ref 3.4–10.8)

## 2021-08-18 PROCEDURE — 85027 COMPLETE CBC AUTOMATED: CPT | Performed by: UROLOGY

## 2021-08-18 PROCEDURE — 80048 BASIC METABOLIC PNL TOTAL CA: CPT | Performed by: UROLOGY

## 2021-08-18 PROCEDURE — 36415 COLL VENOUS BLD VENIPUNCTURE: CPT

## 2021-08-18 NOTE — PAT
Patient here for PAT visit, labs complete, EKG in chart. Pre-op instructions reviewed with patient. Scheduled for covid test on 08/23, patient is aware.

## 2021-08-18 NOTE — DISCHARGE INSTRUCTIONS
TO STOP CLEARS/GATORADE @ 4 AM THE DAY OF SURGERY    TO HOLD THE FOLLOWING MEDICATIONS FOR 1 WEEK PRIOR TO SURGERY: MELOXICAM, ASPIRIN, VITAMIN D    BRING ALBUTEROL INHALER TO THE HOSPITAL    PRE-ADMISSION TESTING INSTRUCTIONS FOR ADULTS    Take these medications the morning of surgery with a small sip of water: ESCITALOPRAM      No aspirin, advil, aleve, ibuprofen, naproxen, diet pills, decongestants, or herbal/vitamins for a week prior to surgery.    General Instructions:    • Do not eat solid food after midnight the night before surgery.  No gum, mints, or hard candy after midnight the night before surgery.  • You may drink clear liquids the day of surgery up until 2 hours before your arrival time.  • Clear liquids are liquids you can see through. Nothing RED in color.    Plain water    Sports drinks  Sodas     Gelatin (Jell-O)  Fruit juices without pulp such as white grape juice and apple juice  Popsicles that contain no fruit or yogurt  Tea or coffee (no cream or milk added)    • It is beneficial for you to have a clear drink that contains carbohydrates just before you leave your house and before your fasting time begins.  We suggest a 20 ounce bottle of Gatorade or Powerade for non-diabetic patients or a 20 ounce bottle of G2 or Powerade Zero for diabetic patients.     • Patients who avoid smoking, chewing tobacco and alcohol for 4 weeks prior to surgery have a reduced risk of post-operative complications.  If at all possible, quit smoking as many days before surgery as you can.    • Do not smoke, use chewing tobacco or drink alcohol the day of surgery    • Bring your C-PAP/ BI-PAP machine if you use one.  • Wear clean comfortable clothes and socks.  • Do not wear contact lenses, lotion, deodorant, or make-up.  Bring a case for your glasses if applicable. You may brush your teeth the morning of surgery.  • You may wear dentures/partials, do not put adhesive/glue on them.  • Bring crutches or walker if  applicable.  • Leave all other jewelry and valuables at home.      Preventing a Surgical Site Infection:    • Shower the night before and on the morning of surgery using the chlorhexidine soap you were given.  Use a clean washcloth with the soap.  Place clean sheets on your bed after showering the night before surgery. Do not use the CHG soap on your hair, face, or private areas. Wash your body gently for five (5) minutes. Do not scrub your skin.  Dry with a clean towel and dress in clean clothing.    • Do not shave the surgical area for 10 days-2 weeks prior to surgery  because the razor can irritate skin and make it easier to develop an infection.  • Make sure you, your family, and all healthcare providers clean their hands with soap and water or an alcohol based hand  before caring for you or your wound.      Day of surgery:    Your surgeon’s office will advise you of your arrival time for the day of surgery.    Upon arrival, a Pre-op nurse and Anesthesia provider will review your health history, obtain vital signs, and answer questions you may have.  The only belongings needed at this time will be your home medications and if applicable your C-PAP/BI-PAP machine.  If you are staying overnight your family can leave the rest of your belongings in the car and bring them to your room later.  A Pre-op nurse will start an IV and you may receive medication in preparation for surgery, including something to help you relax.  Your family will be able to see you in the Pre-op area.  While you are in surgery your family should notify the waiting room  if they leave the waiting room area and provide a contact phone number.    IF you have any questions, you can call the Pre-Admission Department at [phone] or your surgeon's office.  Notify your surgeon if  you become sick, have a fever, productive cough, or cannot be here the day of surgery    Please be aware that surgery does come with discomfort.   We want to make every effort to control your discomfort so please discuss any uncontrolled symptoms with your nurse.   Your doctor will most likely have prescribed pain medications.      If you are going home after surgery, you will receive individualized written care instructions before being discharged.  A responsible adult (over the age of 18) must drive you to and from the hospital on the day of your surgery and stay with you for 24 hours after anesthesia.    If you are staying overnight following surgery, you will be transported to your hospital room following the recovery period.  [facility] has all private rooms.    You may receive a survey regarding the care you received. Your feedback is very important and will be used to collect the necessary data to help us to continue to provide excellent care.     Deductibles and co-payments are collected on the day of service. Please be prepared to pay the required co-pay, deductible or deposit on the day of service as defined by your plan.

## 2021-08-23 ENCOUNTER — LAB (OUTPATIENT)
Dept: LAB | Facility: HOSPITAL | Age: 76
End: 2021-08-23

## 2021-08-23 DIAGNOSIS — U07.1 COVID-19: ICD-10-CM

## 2021-08-23 LAB — SARS-COV-2 RNA PNL SPEC NAA+PROBE: NOT DETECTED

## 2021-08-23 PROCEDURE — C9803 HOPD COVID-19 SPEC COLLECT: HCPCS

## 2021-08-23 PROCEDURE — 87635 SARS-COV-2 COVID-19 AMP PRB: CPT | Performed by: OBSTETRICS & GYNECOLOGY

## 2021-08-24 ENCOUNTER — TRANSCRIBE ORDERS (OUTPATIENT)
Dept: ADMINISTRATIVE | Facility: HOSPITAL | Age: 76
End: 2021-08-24

## 2021-08-24 ENCOUNTER — ANESTHESIA EVENT (OUTPATIENT)
Dept: PERIOP | Facility: HOSPITAL | Age: 76
End: 2021-08-24

## 2021-08-24 DIAGNOSIS — Z01.818 OTHER SPECIFIED PRE-OPERATIVE EXAMINATION: Primary | ICD-10-CM

## 2021-08-25 ENCOUNTER — HOSPITAL ENCOUNTER (OUTPATIENT)
Facility: HOSPITAL | Age: 76
Setting detail: HOSPITAL OUTPATIENT SURGERY
Discharge: HOME | End: 2021-08-25
Attending: UROLOGY | Admitting: UROLOGY

## 2021-08-25 ENCOUNTER — ANESTHESIA (OUTPATIENT)
Dept: PERIOP | Facility: HOSPITAL | Age: 76
End: 2021-08-25

## 2021-08-25 VITALS
BODY MASS INDEX: 28.46 KG/M2 | OXYGEN SATURATION: 97 % | TEMPERATURE: 97.3 F | WEIGHT: 160.6 LBS | RESPIRATION RATE: 22 BRPM | SYSTOLIC BLOOD PRESSURE: 154 MMHG | DIASTOLIC BLOOD PRESSURE: 74 MMHG | HEART RATE: 70 BPM

## 2021-08-25 DIAGNOSIS — R31.0 GROSS HEMATURIA: Primary | ICD-10-CM

## 2021-08-25 LAB
GLUCOSE BLDC GLUCOMTR-MCNC: 126 MG/DL (ref 70–130)
POTASSIUM SERPL-SCNC: 3.7 MMOL/L (ref 3.5–5.2)

## 2021-08-25 PROCEDURE — 84132 ASSAY OF SERUM POTASSIUM: CPT | Performed by: NURSE ANESTHETIST, CERTIFIED REGISTERED

## 2021-08-25 PROCEDURE — 25010000002 ONDANSETRON PER 1 MG: Performed by: NURSE ANESTHETIST, CERTIFIED REGISTERED

## 2021-08-25 PROCEDURE — 25010000002 DEXAMETHASONE PER 1 MG: Performed by: NURSE ANESTHETIST, CERTIFIED REGISTERED

## 2021-08-25 PROCEDURE — 25010000003 CEFAZOLIN SODIUM-DEXTROSE 2-3 GM-%(50ML) RECONSTITUTED SOLUTION: Performed by: UROLOGY

## 2021-08-25 PROCEDURE — 88305 TISSUE EXAM BY PATHOLOGIST: CPT | Performed by: UROLOGY

## 2021-08-25 PROCEDURE — 25010000002 PROPOFOL 10 MG/ML EMULSION: Performed by: NURSE ANESTHETIST, CERTIFIED REGISTERED

## 2021-08-25 PROCEDURE — 25010000002 FENTANYL CITRATE (PF) 50 MCG/ML SOLUTION: Performed by: NURSE ANESTHETIST, CERTIFIED REGISTERED

## 2021-08-25 PROCEDURE — 82962 GLUCOSE BLOOD TEST: CPT

## 2021-08-25 PROCEDURE — 25010000002 MIDAZOLAM PER 1MG: Performed by: NURSE ANESTHETIST, CERTIFIED REGISTERED

## 2021-08-25 RX ORDER — HYDROCODONE BITARTRATE AND ACETAMINOPHEN 5; 325 MG/1; MG/1
1 TABLET ORAL EVERY 4 HOURS PRN
Qty: 20 TABLET | Refills: 0 | Status: SHIPPED | OUTPATIENT
Start: 2021-08-25 | End: 2021-09-23

## 2021-08-25 RX ORDER — CEPHALEXIN 500 MG/1
500 CAPSULE ORAL 3 TIMES DAILY
Qty: 21 CAPSULE | Refills: 0 | Status: SHIPPED | OUTPATIENT
Start: 2021-08-25 | End: 2021-09-01

## 2021-08-25 RX ORDER — SODIUM CHLORIDE 0.9 % (FLUSH) 0.9 %
10 SYRINGE (ML) INJECTION AS NEEDED
Status: DISCONTINUED | OUTPATIENT
Start: 2021-08-25 | End: 2021-08-25 | Stop reason: HOSPADM

## 2021-08-25 RX ORDER — MIDAZOLAM HYDROCHLORIDE 2 MG/2ML
0.5 INJECTION, SOLUTION INTRAMUSCULAR; INTRAVENOUS
Status: DISCONTINUED | OUTPATIENT
Start: 2021-08-25 | End: 2021-08-25 | Stop reason: HOSPADM

## 2021-08-25 RX ORDER — DEXAMETHASONE SODIUM PHOSPHATE 4 MG/ML
4 INJECTION, SOLUTION INTRA-ARTICULAR; INTRALESIONAL; INTRAMUSCULAR; INTRAVENOUS; SOFT TISSUE ONCE AS NEEDED
Status: COMPLETED | OUTPATIENT
Start: 2021-08-25 | End: 2021-08-25

## 2021-08-25 RX ORDER — CEFAZOLIN SODIUM 2 G/50ML
2 SOLUTION INTRAVENOUS ONCE
Status: COMPLETED | OUTPATIENT
Start: 2021-08-25 | End: 2021-08-25

## 2021-08-25 RX ORDER — SODIUM CHLORIDE 0.9 % (FLUSH) 0.9 %
10 SYRINGE (ML) INJECTION EVERY 12 HOURS SCHEDULED
Status: DISCONTINUED | OUTPATIENT
Start: 2021-08-25 | End: 2021-08-25 | Stop reason: HOSPADM

## 2021-08-25 RX ORDER — SODIUM CHLORIDE, SODIUM LACTATE, POTASSIUM CHLORIDE, CALCIUM CHLORIDE 600; 310; 30; 20 MG/100ML; MG/100ML; MG/100ML; MG/100ML
9 INJECTION, SOLUTION INTRAVENOUS CONTINUOUS PRN
Status: DISCONTINUED | OUTPATIENT
Start: 2021-08-25 | End: 2021-08-25 | Stop reason: HOSPADM

## 2021-08-25 RX ORDER — HYDROCODONE BITARTRATE AND ACETAMINOPHEN 5; 325 MG/1; MG/1
1 TABLET ORAL ONCE AS NEEDED
Status: COMPLETED | OUTPATIENT
Start: 2021-08-25 | End: 2021-08-25

## 2021-08-25 RX ORDER — ONDANSETRON 2 MG/ML
4 INJECTION INTRAMUSCULAR; INTRAVENOUS ONCE AS NEEDED
Status: DISCONTINUED | OUTPATIENT
Start: 2021-08-25 | End: 2021-08-25 | Stop reason: HOSPADM

## 2021-08-25 RX ORDER — KETAMINE HYDROCHLORIDE 10 MG/ML
INJECTION INTRAMUSCULAR; INTRAVENOUS AS NEEDED
Status: DISCONTINUED | OUTPATIENT
Start: 2021-08-25 | End: 2021-08-25 | Stop reason: SURG

## 2021-08-25 RX ORDER — MAGNESIUM HYDROXIDE 1200 MG/15ML
LIQUID ORAL AS NEEDED
Status: DISCONTINUED | OUTPATIENT
Start: 2021-08-25 | End: 2021-08-25 | Stop reason: HOSPADM

## 2021-08-25 RX ORDER — LIDOCAINE HYDROCHLORIDE 20 MG/ML
INJECTION, SOLUTION INFILTRATION; PERINEURAL AS NEEDED
Status: DISCONTINUED | OUTPATIENT
Start: 2021-08-25 | End: 2021-08-25 | Stop reason: SURG

## 2021-08-25 RX ORDER — SODIUM CHLORIDE, SODIUM LACTATE, POTASSIUM CHLORIDE, CALCIUM CHLORIDE 600; 310; 30; 20 MG/100ML; MG/100ML; MG/100ML; MG/100ML
100 INJECTION, SOLUTION INTRAVENOUS CONTINUOUS
Status: DISCONTINUED | OUTPATIENT
Start: 2021-08-25 | End: 2021-08-25 | Stop reason: HOSPADM

## 2021-08-25 RX ORDER — PROPOFOL 10 MG/ML
VIAL (ML) INTRAVENOUS AS NEEDED
Status: DISCONTINUED | OUTPATIENT
Start: 2021-08-25 | End: 2021-08-25 | Stop reason: SURG

## 2021-08-25 RX ORDER — FENTANYL CITRATE 50 UG/ML
25 INJECTION, SOLUTION INTRAMUSCULAR; INTRAVENOUS
Status: DISCONTINUED | OUTPATIENT
Start: 2021-08-25 | End: 2021-08-25 | Stop reason: HOSPADM

## 2021-08-25 RX ORDER — LIDOCAINE HYDROCHLORIDE 10 MG/ML
0.5 INJECTION, SOLUTION EPIDURAL; INFILTRATION; INTRACAUDAL; PERINEURAL ONCE AS NEEDED
Status: DISCONTINUED | OUTPATIENT
Start: 2021-08-25 | End: 2021-08-25 | Stop reason: HOSPADM

## 2021-08-25 RX ORDER — FENTANYL CITRATE 50 UG/ML
INJECTION, SOLUTION INTRAMUSCULAR; INTRAVENOUS AS NEEDED
Status: DISCONTINUED | OUTPATIENT
Start: 2021-08-25 | End: 2021-08-25 | Stop reason: SURG

## 2021-08-25 RX ORDER — SODIUM CHLORIDE 9 MG/ML
40 INJECTION, SOLUTION INTRAVENOUS AS NEEDED
Status: DISCONTINUED | OUTPATIENT
Start: 2021-08-25 | End: 2021-08-25 | Stop reason: HOSPADM

## 2021-08-25 RX ORDER — HYDROMORPHONE HYDROCHLORIDE 1 MG/ML
0.25 INJECTION, SOLUTION INTRAMUSCULAR; INTRAVENOUS; SUBCUTANEOUS
Status: DISCONTINUED | OUTPATIENT
Start: 2021-08-25 | End: 2021-08-25 | Stop reason: HOSPADM

## 2021-08-25 RX ORDER — HYDROCODONE BITARTRATE AND ACETAMINOPHEN 7.5; 325 MG/1; MG/1
1 TABLET ORAL EVERY 4 HOURS PRN
Status: DISCONTINUED | OUTPATIENT
Start: 2021-08-25 | End: 2021-08-25 | Stop reason: HOSPADM

## 2021-08-25 RX ORDER — ONDANSETRON 2 MG/ML
4 INJECTION INTRAMUSCULAR; INTRAVENOUS ONCE AS NEEDED
Status: COMPLETED | OUTPATIENT
Start: 2021-08-25 | End: 2021-08-25

## 2021-08-25 RX ORDER — EPHEDRINE SULFATE 50 MG/ML
INJECTION, SOLUTION INTRAVENOUS AS NEEDED
Status: DISCONTINUED | OUTPATIENT
Start: 2021-08-25 | End: 2021-08-25 | Stop reason: SURG

## 2021-08-25 RX ADMIN — EPHEDRINE SULFATE 5 MG: 50 INJECTION, SOLUTION INTRAVENOUS at 08:32

## 2021-08-25 RX ADMIN — CEFAZOLIN SODIUM 2 G: 2 SOLUTION INTRAVENOUS at 08:30

## 2021-08-25 RX ADMIN — EPHEDRINE SULFATE 5 MG: 50 INJECTION, SOLUTION INTRAVENOUS at 08:38

## 2021-08-25 RX ADMIN — MIDAZOLAM HYDROCHLORIDE 0.5 MG: 1 INJECTION, SOLUTION INTRAMUSCULAR; INTRAVENOUS at 08:12

## 2021-08-25 RX ADMIN — DEXAMETHASONE SODIUM PHOSPHATE 4 MG: 4 INJECTION, SOLUTION INTRAMUSCULAR; INTRAVENOUS at 07:12

## 2021-08-25 RX ADMIN — SODIUM CHLORIDE, POTASSIUM CHLORIDE, SODIUM LACTATE AND CALCIUM CHLORIDE 9 ML/HR: 600; 310; 30; 20 INJECTION, SOLUTION INTRAVENOUS at 06:35

## 2021-08-25 RX ADMIN — HYDROCODONE BITARTRATE AND ACETAMINOPHEN 1 TABLET: 5; 325 TABLET ORAL at 09:43

## 2021-08-25 RX ADMIN — LIDOCAINE HYDROCHLORIDE 60 MG: 20 INJECTION, SOLUTION INFILTRATION; PERINEURAL at 08:21

## 2021-08-25 RX ADMIN — PROPOFOL 150 MG: 10 INJECTION, EMULSION INTRAVENOUS at 08:22

## 2021-08-25 RX ADMIN — FENTANYL CITRATE 25 MCG: 50 INJECTION INTRAMUSCULAR; INTRAVENOUS at 08:31

## 2021-08-25 RX ADMIN — ONDANSETRON 4 MG: 2 INJECTION INTRAMUSCULAR; INTRAVENOUS at 07:12

## 2021-08-25 RX ADMIN — KETAMINE HYDROCHLORIDE 10 MG: 10 INJECTION, SOLUTION INTRAMUSCULAR; INTRAVENOUS at 08:22

## 2021-08-25 NOTE — ANESTHESIA PROCEDURE NOTES
Airway  Urgency: elective    Date/Time: 8/25/2021 8:24 AM  Airway not difficult    General Information and Staff    Patient location during procedure: OR  CRNA: Marina Sheridan CRNA    Indications and Patient Condition  Indications for airway management: airway protection    Preoxygenated: yes  Mask difficulty assessment: 0 - not attempted    Final Airway Details  Final airway type: supraglottic airway      Successful airway: unique  Size 4    Number of attempts at approach: 1  Assessment: lips, teeth, and gum same as pre-op

## 2021-08-25 NOTE — OP NOTE
CYSTOSCOPY BLADDER BIOPSY  Procedure Note    Britta Armijo  8/25/2021    Pre-op Diagnosis:   Gross Hematuria with Abnormal Cytology    Post-op Diagnosis:     Post-Op Diagnosis Codes:     * Gross hematuria [R31.0]     * Abnormal urine cytology [R82.89]    Procedure(s):  CYSTOSCOPY BLADDER BIOPSY    Surgeon(s):  Segundo Sprague MD    Anesthesia: General    Staff:   Circulator: Jose D Oropeza RN  Scrub Person: Felicia Palacios    Estimated Blood Loss: none    Specimens:                Order Name Source Comment Collection Info Order Time   POTASSIUM   Collected By: Berna Wise RN 8/24/2021 12:56 PM     Release to patient   Immediate        TISSUE PATHOLOGY EXAM Urinary Bladder POST. BLADDER WALL BIOPSY. Collected By: Segundo Sprague MD 8/25/2021  8:38 AM     Release to patient   Immediate              Drains: * No LDAs found *    Findings: Normal bladder urothelium.  Normal trigone.  Normal ureters.  Normal urethra.  Biopsies taken of the posterior wall lateral walls and dome.  No suspicious plasia or urothelial malignancy noted.    Complications: None apparent    Indications: 75-year-old female with gross hematuria had initial work-up which showed normal upper tracts but an abnormal urine cytology.  She now presents for cystoscopy under anesthesia.    Procedure: Patient was taken to the operative suite given general anesthesia.  Placed in lithotomy.  Prepped and draped in a sterile fashion.  Surgical timeout was performed.  The 23 Setswana cystoscope was inserted.  The bladder was unremarkable.  She had no urothelial findings of concern.  Normal bladder volume.  Normal trigone.  Normal orifices.  Normal urethra.  30 and 7 degree lenses were utilized.  Biopsies were taken of the posterior wall left and right lateral walls as well as the dome.  These areas were cauterized.  She was awoken taken recovery stable condition.      Segundo Sprague MD     Date: 8/25/2021  Time: 08:58 EDT

## 2021-08-25 NOTE — ANESTHESIA PREPROCEDURE EVALUATION
Anesthesia Evaluation     Patient summary reviewed and Nursing notes reviewed   no history of anesthetic complications:    NPO Liquid Status: > 2 hours           Airway   Mallampati: I  TM distance: >3 FB  Neck ROM: full  No difficulty expected  Dental    (+) edentulous    Pulmonary - negative pulmonary ROS and normal exam    breath sounds clear to auscultation  Cardiovascular - normal exam  Exercise tolerance: good (4-7 METS)    ECG reviewed  PT is on anticoagulation therapy  Rhythm: regular  Rate: normal    (+) hypertension well controlled less than 2 medications, past MI  >12 months, hyperlipidemia,     ROS comment: HEART RATE= 70  bpm  RR Interval= 852  ms  RI Interval= 185  ms  P Horizontal Axis= 41  deg  P Front Axis= 24  deg  QRSD Interval= 112  ms  QT Interval= 414  ms  QRS Axis= -14  deg  T Wave Axis= 55  deg  - NORMAL ECG -  Sinus rhythm  NO PRIOR TRACING AVAILABLE FOR COMPARISON  Electronically Signed By: Amaury Allen ([facility]) 09-Mar-2021 17:18:36  Date and Time of Study: 2021-03-09 14:02:31    81mg ASA - last dose 7 days ago    Neuro/Psych  (+) psychiatric history Anxiety,     (-) seizures, CVA, syncope  GI/Hepatic/Renal/Endo    (+)  GERD,      Musculoskeletal     Abdominal  - normal exam   Substance History - negative use     OB/GYN negative ob/gyn ROS         Other   arthritis,      ROS/Med Hx Other: K 3.7 this AM                Anesthesia Plan    ASA 2     general     intravenous induction     Anesthetic plan, all risks, benefits, and alternatives have been provided, discussed and informed consent has been obtained with: patient.  Use of blood products discussed with patient  Consented to blood products.

## 2021-08-25 NOTE — H&P
[facility] and Physical    Patient Care Team:  Glenn Pendleton MD as PCP - General (Family Medicine)    Chief complaint hematuria    Subjective     Patient is a 75 y.o. female presents with hematuria.  Evaluation the office showed normal upper tract studies on her CAT scan but she had an abnormal urine cytology.     Review of Systems   Pertinent items are noted in HPI    Past Medical History:   Diagnosis Date   • Arthritis of back     NECK & LOWER BACK   • COVID-19 vaccine series completed    • Fracture, humerus    • Frequent urinary tract infections     SCHEDULED FOR BLADDER BIOPSY   • Generalized anxiety disorder with panic attacks    • GERD (gastroesophageal reflux disease)    • Heart attack (CMS/HCC)     YEARS AGO   • Hyperlipidemia    • Hypertension    • Lumbosacral disc disease    • MRSA (methicillin resistant staph aureus) culture positive 2018    POSITIVE NASAL SWAB PRIOR TO HIP SURGERY   • Osteoporosis    • Right shoulder pain     SCHEDULED FOR TOTAL SHOULDER   • Unable to read or write     UNABLE TO READ     Past Surgical History:   Procedure Laterality Date   • APPENDECTOMY     • BREAST LUMPECTOMY Left     BENIGN   • BREAST SURGERY  07/03/2020   • CATARACT EXTRACTION, BILATERAL     • CHOLECYSTECTOMY OPEN     • EPIDURAL BLOCK     • HEMORRHOIDECTOMY      X4   • HIP SURGERY Right     Replacement   • HYSTERECTOMY  1991   • SHOULDER MANIPULATION Right 5/14/2021    Procedure: REVERSE TOTAL SHOULDER MANIPULATION;  Surgeon: Derrick Pelayo MD;  Location: [facility] OR;  Service: Orthopedics;  Laterality: Right;   • TOTAL SHOULDER ARTHROPLASTY W/ DISTAL CLAVICLE EXCISION Right 3/22/2021    Procedure: TOTAL SHOULDER REVERSE ARTHROPLASTY;  Surgeon: Derrick Pelayo MD;  Location:  [facility] OR;  Service: Orthopedics;  Laterality: Right;  TOTAL SHOULDER REVERSE ARTHROPLASTY   • TUMOR REMOVAL  2017    RIGHT ARM      Family History   Problem Relation Age of Onset   • Cancer Father          spine   • Diabetes Paternal Aunt    • Diabetes Paternal Uncle      Social History     Tobacco Use   • Smoking status: Never Smoker   • Smokeless tobacco: Never Used   Vaping Use   • Vaping Use: Never used   Substance Use Topics   • Alcohol use: Never   • Drug use: Never       Meds:  Medications Prior to Admission   Medication Sig Dispense Refill Last Dose   • albuterol sulfate  (90 Base) MCG/ACT inhaler Inhale 2 puffs Every 4 (Four) Hours As Needed for Wheezing.   8/24/2021 at Unknown time   • losartan-hydrochlorothiazide (HYZAAR) 100-12.5 MG per tablet Take 1 tablet by mouth Every Morning.   8/25/2021 at 0500   • acetaminophen (TYLENOL) 650 MG 8 hr tablet Take 650 mg by mouth Every 8 (Eight) Hours As Needed for Mild Pain .   8/23/2021   • aspirin 81 MG EC tablet Take 1 tablet by mouth Daily. 30 tablet 0 8/18/2021   • escitalopram (LEXAPRO) 20 MG tablet Take 20 mg by mouth Every Morning.   8/23/2021   • famotidine (PEPCID) 40 MG tablet Take 40 mg by mouth Every Night.   8/23/2021   • meloxicam (MOBIC) 15 MG tablet Take 1 tablet by mouth Daily. (Patient taking differently: Take 15 mg by mouth Every Night.) 30 tablet 0 8/18/2021   • nitroglycerin (Nitrostat) 0.4 MG SL tablet Place 0.4 mg under the tongue Every 5 (Five) Minutes As Needed.   Unknown at Unknown time   • simvastatin (ZOCOR) 10 MG tablet Take 10 mg by mouth Every Other Day. TAKES EVERY OTHER NIGHT   8/23/2021   • vitamin D3 125 MCG (5000 UT) capsule capsule Take 5,000 Units by mouth Every Night.   8/18/2021       Allergies:  Sulfa antibiotics    Debilities:  None    Objective     Vital Signs  Temp:  [98.9 °F (37.2 °C)] 98.9 °F (37.2 °C)  Heart Rate:  [74] 74  Resp:  [14] 14  BP: (170)/(71) 170/71  No intake or output data in the 24 hours ending 08/25/21 0814  Flowsheet Rows      First Filed Value   Admission Height  --   Admission Weight  72.8 kg (160 lb 9.6 oz) Documented at 08/25/2021 0615           Physical Exam:     General Appearance:     Alert, cooperative, NAD   HEENT:    No trauma, pupils reactive, hearing intact   Back:     No CVA tenderness   Lungs:     Respirations unlabored, no wheezing    Heart:    RRR, intact peripheral pulses   Abdomen:     Soft, NDNT, no masses, no guarding   :   Deferred   Extremities:   No edema, no deformity   Lymphatic:   No neck or groin LAD   Skin:   No bleeding, bruising or rashes   Neuro/Psych:   Orientation intact, mood/affect pleasant, no focal findings     Results Review:    I reviewed the patient's new clinical results.  Results for orders placed or performed during the hospital encounter of 08/25/21   Potassium    Specimen: Blood   Result Value Ref Range    Potassium 3.7 3.5 - 5.2 mmol/L   POC Glucose Once    Specimen: Blood   Result Value Ref Range    Glucose 126 70 - 130 mg/dL        Assessment:  Hematuria with abnormal urine cytology    Plan:    Cystoscopy with bladder biopsies    I discussed the patient's findings and my recommendations with patient.   Risks, complications, outcomes and alternatives discussed with the patient at the bedside and office.    Segundo Sprague MD  08/25/21  08:14 EDT

## 2021-08-25 NOTE — ANESTHESIA POSTPROCEDURE EVALUATION
Patient: Britta Armijo    Procedure Summary     Date: 08/25/21 Room / Location: [facility] OR 4 /  [facility] OR    Anesthesia Start: 0814 Anesthesia Stop: 0850    Procedure: CYSTOSCOPY BLADDER BIOPSY (N/A Bladder) Diagnosis:       Gross hematuria      Abnormal urine cytology      (Gross Hematuria with Abnormal Cytology)    Surgeons: Segundo Sprague MD Provider:     Anesthesia Type: general ASA Status: 2          Anesthesia Type: general    Vitals  Vitals Value Taken Time   /100 08/25/21 0915   Temp 98 °F (36.7 °C) 08/25/21 0852   Pulse 76 08/25/21 0916   Resp 17 08/25/21 0915   SpO2 94 % 08/25/21 0917   Vitals shown include unvalidated device data.        Post Anesthesia Care and Evaluation    Patient location during evaluation: PHASE II  Patient participation: complete - patient participated  Level of consciousness: awake and alert  Pain score: 0  Pain management: adequate  Airway patency: patent  Anesthetic complications: No anesthetic complications  PONV Status: none  Cardiovascular status: acceptable  Respiratory status: acceptable  Hydration status: acceptable

## 2021-08-26 LAB
CYTO UR: NORMAL
LAB AP CASE REPORT: NORMAL
LAB AP CLINICAL INFORMATION: NORMAL
PATH REPORT.FINAL DX SPEC: NORMAL
PATH REPORT.GROSS SPEC: NORMAL

## 2021-09-10 ENCOUNTER — APPOINTMENT (OUTPATIENT)
Dept: LAB | Facility: HOSPITAL | Age: 76
End: 2021-09-10

## 2021-09-13 ENCOUNTER — APPOINTMENT (OUTPATIENT)
Dept: BONE DENSITY | Facility: HOSPITAL | Age: 76
End: 2021-09-13

## 2021-09-13 ENCOUNTER — APPOINTMENT (OUTPATIENT)
Dept: PULMONOLOGY | Facility: HOSPITAL | Age: 76
End: 2021-09-13

## 2021-09-13 ENCOUNTER — APPOINTMENT (OUTPATIENT)
Dept: MAMMOGRAPHY | Facility: HOSPITAL | Age: 76
End: 2021-09-13

## 2021-09-23 ENCOUNTER — OFFICE VISIT (OUTPATIENT)
Dept: ORTHOPEDIC SURGERY | Facility: CLINIC | Age: 76
End: 2021-09-23

## 2021-09-23 VITALS — BODY MASS INDEX: 28.35 KG/M2 | HEIGHT: 63 IN | WEIGHT: 160 LBS

## 2021-09-23 DIAGNOSIS — Z96.611 STATUS POST REVERSE ARTHROPLASTY OF RIGHT SHOULDER: ICD-10-CM

## 2021-09-23 DIAGNOSIS — Z96.611 INSTABILITY OF REVERSE TOTAL ARTHROPLASTY OF RIGHT SHOULDER (HCC): Primary | ICD-10-CM

## 2021-09-23 DIAGNOSIS — T84.028A INSTABILITY OF REVERSE TOTAL ARTHROPLASTY OF RIGHT SHOULDER (HCC): Primary | ICD-10-CM

## 2021-09-23 PROCEDURE — 99212 OFFICE O/P EST SF 10 MIN: CPT | Performed by: ORTHOPAEDIC SURGERY

## 2021-09-23 PROCEDURE — 73030 X-RAY EXAM OF SHOULDER: CPT | Performed by: ORTHOPAEDIC SURGERY

## 2021-09-23 NOTE — PROGRESS NOTES
Subjective:     Patient ID: Britta Armijo is a 76 y.o. female.    Chief Complaint:  F/u s/p closed reduction right reverse shoulder arthroplasty DOS 5/14/2021  S/p right reverse TSA 3/22/21    History of Present Illness     Britta Armijo returns to clinic today for follow up of a right total reverse shoulder arthroplasty on 03/22/2021.    The patient notes that she is doing just fine. She is no longer attending physical therapy but she is continuing to complete her exercises at home daily.      Social History     Occupational History   • Not on file   Tobacco Use   • Smoking status: Never Smoker   • Smokeless tobacco: Never Used   Vaping Use   • Vaping Use: Never used   Substance and Sexual Activity   • Alcohol use: Never   • Drug use: Never   • Sexual activity: Defer      Past Medical History:   Diagnosis Date   • Arthritis of back     NECK & LOWER BACK   • COVID-19 vaccine series completed    • Fracture, humerus    • Frequent urinary tract infections     SCHEDULED FOR BLADDER BIOPSY   • Generalized anxiety disorder with panic attacks    • GERD (gastroesophageal reflux disease)    • Gross hematuria 8/25/2021   • Heart attack (CMS/HCC)     YEARS AGO   • Hyperlipidemia    • Hypertension    • Lumbosacral disc disease    • MRSA (methicillin resistant staph aureus) culture positive 2018    POSITIVE NASAL SWAB PRIOR TO HIP SURGERY   • Osteoporosis    • Right shoulder pain     SCHEDULED FOR TOTAL SHOULDER   • Unable to read or write     UNABLE TO READ     Past Surgical History:   Procedure Laterality Date   • APPENDECTOMY     • BREAST LUMPECTOMY Left     BENIGN   • BREAST SURGERY  07/03/2020   • CATARACT EXTRACTION, BILATERAL     • CHOLECYSTECTOMY OPEN     • CYSTOSCOPY BLADDER BIOPSY N/A 8/25/2021    Procedure: CYSTOSCOPY BLADDER BIOPSY;  Surgeon: Segundo Sprague MD;  Location: [facility];  Service: Urology;  Laterality: N/A;   • EPIDURAL BLOCK     • HEMORRHOIDECTOMY      X4   • HIP SURGERY Right     Replacement  "  • HYSTERECTOMY  1991   • SHOULDER MANIPULATION Right 5/14/2021    Procedure: REVERSE TOTAL SHOULDER MANIPULATION;  Surgeon: Derrick Pelayo MD;  Location:  [facility] OR;  Service: Orthopedics;  Laterality: Right;   • TOTAL SHOULDER ARTHROPLASTY W/ DISTAL CLAVICLE EXCISION Right 3/22/2021    Procedure: TOTAL SHOULDER REVERSE ARTHROPLASTY;  Surgeon: Derrick Pelayo MD;  Location:  [facility] OR;  Service: Orthopedics;  Laterality: Right;  TOTAL SHOULDER REVERSE ARTHROPLASTY   • TUMOR REMOVAL  2017    RIGHT ARM        Family History   Problem Relation Age of Onset   • Cancer Father         spine   • Diabetes Paternal Aunt    • Diabetes Paternal Uncle          Review of Systems        Objective:  Vitals:    09/23/21 0858   Weight: 72.6 kg (160 lb)   Height: 160 cm (62.99\")         09/23/21  0858   Weight: 72.6 kg (160 lb)     Body mass index is 28.35 kg/m².  General: No acute distress.  Resp: normal respiratory effort  Skin: no rashes or wounds; normal turgor  Psych: mood and affect appropriate; recent and remote memory intact          Ortho Exam       Right Shoulder-    Anterior incision is well healed. No effusion.    FF-   Active- 100 degrees  Passive- 130 degrees     ER-      Active- neutral  Passive - 35 degrees   Strength- 4-/5    Strength- 5/5  flexion/extension fingers     Brisk cap refill to all digits, palpable radial pulse     Positive deltoid firing  Positive sensation proximal lateral arm    Imaging:  Right Shoulder X-Ray  Indication: Status post shoulder arthroplasty  AP, scapular Y, and axillary lateral views    Findings:  Shoulder arthroplasty components appear to be stable in position, well-seated, overall acceptable alignment.  No evidence of osteolysis, loosening, periprosthetic fracture, or reactive bone formation.    Compared to prior postoperative x-rays from office    Assessment:        1. 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 Status post reverse arthroplasty " of right shoulder           Plan:          1. Discussed treatment options at length with patient at today's visit.   2. I encouraged the patient to continue with her at home exercise to build her strength.  3. Follow up: 3 months.         Britta Armijo was in agreement with plan and had all questions answered.     Orders:  Orders Placed This Encounter   Procedures   • XR Shoulder 2+ View Right       Medications:  No orders of the defined types were placed in this encounter.      Followup:  Return in about 3 months (around 12/23/2021).    Diagnoses and all orders for this visit:    1. 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 Status post reverse arthroplasty of right shoulder          Dictated utilizing Dragon dictation     Transcribed from ambient dictation for Derrick Pelayo MD by Jeremy Crawley.  09/23/21   13:06 EDT    I have personally performed the services described in this document as transcribed by the above individual, and it is both accurate and complete.  Derrick Pelayo MD  9/29/2021  15:21 EDT

## 2021-10-08 ENCOUNTER — LAB (OUTPATIENT)
Dept: LAB | Facility: HOSPITAL | Age: 76
End: 2021-10-08

## 2021-10-08 DIAGNOSIS — Z01.818 OTHER SPECIFIED PRE-OPERATIVE EXAMINATION: ICD-10-CM

## 2021-10-11 ENCOUNTER — APPOINTMENT (OUTPATIENT)
Dept: PULMONOLOGY | Facility: HOSPITAL | Age: 76
End: 2021-10-11

## 2021-10-11 ENCOUNTER — HOSPITAL ENCOUNTER (OUTPATIENT)
Dept: MAMMOGRAPHY | Facility: HOSPITAL | Age: 76
End: 2021-10-11

## 2021-10-11 ENCOUNTER — APPOINTMENT (OUTPATIENT)
Dept: BONE DENSITY | Facility: HOSPITAL | Age: 76
End: 2021-10-11

## 2021-10-28 ENCOUNTER — TRANSCRIBE ORDERS (OUTPATIENT)
Dept: ADMINISTRATIVE | Facility: HOSPITAL | Age: 76
End: 2021-10-28

## 2021-10-28 DIAGNOSIS — Z01.818 OTHER SPECIFIED PRE-OPERATIVE EXAMINATION: Primary | ICD-10-CM

## 2021-10-28 RX ORDER — ASPIRIN 81 MG/1
TABLET, COATED ORAL
Qty: 30 TABLET | Refills: 0 | OUTPATIENT
Start: 2021-10-28

## 2021-10-30 ENCOUNTER — LAB (OUTPATIENT)
Dept: LAB | Facility: HOSPITAL | Age: 76
End: 2021-10-30

## 2021-10-30 DIAGNOSIS — Z01.818 OTHER SPECIFIED PRE-OPERATIVE EXAMINATION: ICD-10-CM

## 2021-10-30 LAB — SARS-COV-2 RNA PNL SPEC NAA+PROBE: NOT DETECTED

## 2021-10-30 PROCEDURE — C9803 HOPD COVID-19 SPEC COLLECT: HCPCS

## 2021-10-30 PROCEDURE — 87635 SARS-COV-2 COVID-19 AMP PRB: CPT | Performed by: FAMILY MEDICINE

## 2021-11-01 ENCOUNTER — APPOINTMENT (OUTPATIENT)
Dept: BONE DENSITY | Facility: HOSPITAL | Age: 76
End: 2021-11-01

## 2021-11-01 ENCOUNTER — HOSPITAL ENCOUNTER (OUTPATIENT)
Dept: PULMONOLOGY | Facility: HOSPITAL | Age: 76
Discharge: HOME | End: 2021-11-01

## 2021-11-01 ENCOUNTER — HOSPITAL ENCOUNTER (OUTPATIENT)
Dept: MAMMOGRAPHY | Facility: HOSPITAL | Age: 76
Discharge: HOME | End: 2021-11-01

## 2021-11-01 DIAGNOSIS — R06.02 SHORTNESS OF BREATH ON EXERTION: ICD-10-CM

## 2021-11-01 DIAGNOSIS — Z12.31 VISIT FOR SCREENING MAMMOGRAM: ICD-10-CM

## 2021-11-01 DIAGNOSIS — Z78.0 MENOPAUSE: ICD-10-CM

## 2021-11-01 PROCEDURE — 77067 SCR MAMMO BI INCL CAD: CPT

## 2021-11-01 PROCEDURE — 77080 DXA BONE DENSITY AXIAL: CPT

## 2021-11-01 PROCEDURE — 94060 EVALUATION OF WHEEZING: CPT

## 2021-11-01 PROCEDURE — 77063 BREAST TOMOSYNTHESIS BI: CPT

## 2021-11-01 PROCEDURE — 63710000001 ALBUTEROL SULFATE HFA 108 (90 BASE) MCG/ACT AEROSOL SOLUTION 8 G INHALER: Performed by: FAMILY MEDICINE

## 2021-11-01 PROCEDURE — A9270 NON-COVERED ITEM OR SERVICE: HCPCS | Performed by: FAMILY MEDICINE

## 2021-11-01 RX ORDER — ALBUTEROL SULFATE 90 UG/1
4 AEROSOL, METERED RESPIRATORY (INHALATION) ONCE
Status: COMPLETED | OUTPATIENT
Start: 2021-11-01 | End: 2021-11-01

## 2021-11-01 RX ADMIN — ALBUTEROL SULFATE 4 PUFF: 90 AEROSOL, METERED RESPIRATORY (INHALATION) at 09:22

## 2021-12-23 ENCOUNTER — OFFICE VISIT (OUTPATIENT)
Dept: ORTHOPEDIC SURGERY | Facility: CLINIC | Age: 76
End: 2021-12-23

## 2021-12-23 VITALS — HEIGHT: 63 IN | WEIGHT: 160 LBS | BODY MASS INDEX: 28.35 KG/M2

## 2021-12-23 DIAGNOSIS — Z96.611 STATUS POST REVERSE ARTHROPLASTY OF RIGHT SHOULDER: Primary | ICD-10-CM

## 2021-12-23 DIAGNOSIS — T84.028A INSTABILITY OF REVERSE TOTAL ARTHROPLASTY OF RIGHT SHOULDER: ICD-10-CM

## 2021-12-23 DIAGNOSIS — Z96.611 INSTABILITY OF REVERSE TOTAL ARTHROPLASTY OF RIGHT SHOULDER: ICD-10-CM

## 2021-12-23 PROCEDURE — 73030 X-RAY EXAM OF SHOULDER: CPT | Performed by: ORTHOPAEDIC SURGERY

## 2021-12-23 PROCEDURE — 99213 OFFICE O/P EST LOW 20 MIN: CPT | Performed by: ORTHOPAEDIC SURGERY

## 2021-12-23 RX ORDER — CYCLOBENZAPRINE HCL 5 MG
5 TABLET ORAL 3 TIMES DAILY PRN
Qty: 45 TABLET | Refills: 0 | Status: SHIPPED | OUTPATIENT
Start: 2021-12-23

## 2021-12-23 RX ORDER — PREDNISONE 10 MG/1
TABLET ORAL
Qty: 39 TABLET | Refills: 0 | OUTPATIENT
Start: 2021-12-23 | End: 2022-08-05

## 2021-12-29 ENCOUNTER — APPOINTMENT (OUTPATIENT)
Dept: PHYSICAL THERAPY | Facility: HOSPITAL | Age: 76
End: 2021-12-29

## 2022-01-04 ENCOUNTER — HOSPITAL ENCOUNTER (OUTPATIENT)
Dept: PHYSICAL THERAPY | Facility: HOSPITAL | Age: 77
Setting detail: THERAPIES SERIES
Discharge: HOME | End: 2022-01-04

## 2022-01-04 DIAGNOSIS — T84.028A INSTABILITY OF REVERSE TOTAL ARTHROPLASTY OF LEFT SHOULDER: ICD-10-CM

## 2022-01-04 DIAGNOSIS — Z96.612 INSTABILITY OF REVERSE TOTAL ARTHROPLASTY OF LEFT SHOULDER: ICD-10-CM

## 2022-01-04 DIAGNOSIS — Z96.611 S/P REVERSE TOTAL SHOULDER ARTHROPLASTY, RIGHT: Primary | ICD-10-CM

## 2022-01-04 PROCEDURE — 97161 PT EVAL LOW COMPLEX 20 MIN: CPT | Performed by: PHYSICAL THERAPIST

## 2022-01-04 NOTE — THERAPY EVALUATION
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)   • Gross hematuria        Past Medical History:   Diagnosis Date   • Arthritis of back     NECK & LOWER BACK   • COVID-19 vaccine series completed    • Fracture, humerus    • Frequent urinary tract infections     SCHEDULED FOR BLADDER BIOPSY   • Generalized anxiety disorder with panic attacks    • GERD (gastroesophageal reflux disease)    • Gross hematuria 2021   • Heart attack (HCC)     YEARS AGO   • Hyperlipidemia    • Hypertension    • Lumbosacral disc disease    • MRSA (methicillin resistant staph aureus) culture positive 2018    POSITIVE NASAL SWAB PRIOR TO HIP SURGERY   • Osteoporosis    • Right shoulder pain     SCHEDULED FOR TOTAL SHOULDER   • Unable to read or write     UNABLE TO READ        Past Surgical History:   Procedure Laterality Date   • APPENDECTOMY     • BREAST BIOPSY Left     benign   • BREAST LUMPECTOMY Left     BENIGN   • BREAST SURGERY  2020   • CATARACT EXTRACTION, BILATERAL     • CHOLECYSTECTOMY OPEN     • CYSTOSCOPY BLADDER BIOPSY N/A 2021    Procedure: CYSTOSCOPY BLADDER BIOPSY;  Surgeon: Segundo Sprague MD;  Location: [facility];  Service: Urology;  Laterality: N/A;   • EPIDURAL BLOCK     • HEMORRHOIDECTOMY      X4   • HIP SURGERY Right     Replacement   • HYSTERECTOMY     • SHOULDER MANIPULATION Right 2021    Procedure: REVERSE TOTAL SHOULDER MANIPULATION;  Surgeon: Derrick Pelayo MD;  Location: [facility];  Service: Orthopedics;  Laterality: Right;   • TOTAL SHOULDER  ARTHROPLASTY W/ DISTAL CLAVICLE EXCISION Right 3/22/2021    Procedure: TOTAL SHOULDER REVERSE ARTHROPLASTY;  Surgeon: Derrick Pelayo MD;  Location: [facility];  Service: Orthopedics;  Laterality: Right;  TOTAL SHOULDER REVERSE ARTHROPLASTY   • TUMOR REMOVAL  2017    RIGHT ARM        Visit Dx:     ICD-10-CM ICD-9-CM   1. S/P reverse total shoulder arthroplasty, right  Z96.611 V43.61   2. Instability of reverse total arthroplasty of left shoulder ([facility])  T84.028A 996.42    Z96.612 V43.61          Patient History     Row Name 01/04/22 0900             History    Chief Complaint Pain; Difficulty with daily activities  -SP      Type of Pain Shoulder pain; Upper Extremity / Arm  -SP      Date Current Problem(s) Began 03/22/21  -SP      Brief Description of Current Complaint Patient reports that she fractured her right shoulder approximately 3 years ago.  Patient reports that she continued to have pain and immobility and is now s/p right total shoulder performed 3/22/21.   Patient reports that surgery went well.  Patient started PT and was found to have increased right shoulder pain, edema and ecchymosis.  Patient was found to have right TSA dislocation of indeterminate date on 5-11-21 during follow up with Dr. Pelayo.  Patient underwent closed reduction of right reverse shoulder arthroplasty on 5-14-21.   Patient reports that she has had increased pain in right shoulder over the last couple of days.  She reports that she did not have any specific incident, injury or fall.  She states she has not reached behind herself and has consistently been wearing her sling as ordered by Dr. Pelayo.  Patient states that a couple of days ago, she tried to sleep in her bed and her bed is elevated and she has to crawl up on bed by weight bearing on UEs.  She has since been sleeping in a recliner.  Patient finished with PT but returned to Dr. Pelayo reporting several month history of increased right shoulder pain.  Patient reports that  she has been doing her exercises, but states that she has constant pain in anterior shoulder.  Patient states that Dr. Pelayo suggested that she may likely need more surgery.  Patient states that she may have scar tissue.  -SP      Patient/Caregiver Goals Relieve pain; Improve mobility  -SP      Patient/Caregiver Goals Comment be able to use arm  -SP      Patient's Rating of General Health Good  -SP      Hand Dominance right-handed  -SP      Occupation/sports/leisure activities disabled; depends on caregiver for a ride to therapy.  enjoys walking  -SP      How has patient tried to help current problem? ice, tylenol  -SP              Pain     Pain Location Shoulder  -SP      Pain at Present 5  -SP      Pain at Worst 9  -SP      Pain Frequency Constant/continuous  -SP      Pain Description Aching; Sharp  -SP      What Performance Factors Make the Current Problem(s) WORSE? reaching away from body, trying to sleeping  -SP      What Performance Factors Make the Current Problem(s) BETTER? ice, tylenol  -SP      Tolerance Time- Standing unlimited  -SP      Tolerance Time- Sitting unlimited  -SP      Tolerance Time- Walking unlimited  -SP      Is your sleep disturbed? Yes  -SP      What position do you sleep in? Left sidelying  -SP      Difficulties with ADL's? increased time required for ADLs, dressing, fixing hair etc  -SP              Fall Risk Assessment    Any falls in the past year: No  -SP              Daily Activities    Primary Language English  -SP      Are you able to read Yes  -SP      Are you able to write Yes  -SP      How does patient learn best? Listening; Reading; Demonstration; Pictures/Video  -SP      Teaching needs identified Management of Condition  -SP      Patient is concerned about/has problems with Repetitive movements of the hand, arm, shoulder; Reaching over head  -SP      Pt Participated in POC and Goals Yes  -SP              Safety    Are you being hurt, hit, or frightened by anyone at home or  in your life? No  -SP      Are you being neglected by a caregiver No  -SP            User Key  (r) = Recorded By, (t) = Taken By, (c) = Cosigned By    Initials Name Provider Type    Chantell Coley, PT Physical Therapist                 PT Ortho     Row Name 01/04/22 0900       Posture/Observations    Observations Muscle atrophy; Incision healing  -SP    Posture/Observations Comments right anterior shoulder incision well healed.  moderate deltoid atrophy present  -SP       Shoulder Girdle Palpation    Subacromial Space Right:; Tender  -SP    Deltoid Right:; Atrophied; Tender  -SP       Right Upper Ext    Rt Shoulder Abduction AROM 60 degrees  -SP    Rt Shoulder Abduction PROM 162 degrees  -SP    Rt Shoulder Flexion AROM 80 degrees  -SP    Rt Shoulder Flexion PROM 116 degrees  -SP    Rt Shoulder External Rotation AROM unable to reach to back of head  -SP    Rt Shoulder External Rotation PROM 40  -SP    Rt Shoulder Internal Rotation PROM 72  -SP    Rt Elbow Extension/Flexion AROM 13 to 137 degrees  -SP       MMT Right Upper Ext    Rt Shoulder Flexion MMT, Gross Movement (2/5) poor  -SP    Rt Shoulder Extension MMT, Gross Movement (4-/5) good minus  -SP    Rt Shoulder ABduction MMT, Gross Movement (2/5) poor  -SP    Rt Shoulder Internal Rotation MMT, Gross Movement (3/5) fair  -SP    Rt Shoulder External Rotation MMT, Gross Movement (3-/5) fair minus  -SP    Rt Scapular ADduction MMT, Gross Movement (2/5) poor  -SP    Rt Scapular ADduction & Depression MMT, Gross Movement (2/5) poor  -SP    Rt Elbow Flexion MMT, Gross Movement: (4-/5) good minus  -SP    Rt Elbow Extension MMT, Gross Movement: (4-/5) good minus  -SP       Sensation    Sensation WNL? WFL  -SP       Upper Extremity Flexibility    Upper Trapezius Right:; Moderately limited  -SP    Pect Major Bilateral:; Moderately limited  -SP    Biceps Right:; Moderately limited  -SP       Transfers    Bed-Chair Jones (Transfers) independent  -SP     Chair-Bed Edmunds (Transfers) independent  -SP    Sit-Stand Edmunds (Transfers) independent  -SP    Stand-Sit Edmunds (Transfers) independent  -SP       Gait/Stairs (Locomotion)    Edmunds Level (Gait) independent  -SP          User Key  (r) = Recorded By, (t) = Taken By, (c) = Cosigned By    Initials Name Provider Type    Chantell Coley, PT Physical Therapist                            Therapy Education  Given: HEP  Program: Reinforced  How Provided: Verbal  Provided to: Patient  Level of Understanding: Verbalized, Demonstrated      PT OP Goals     Row Name 01/04/22 1100          PT Short Term Goals    STG Date to Achieve 01/19/22  -SP     STG 1 Patient demonstrates AROM right shoulder flexion to >100 degrees  -SP     STG 2 Patient demonstrates right elbow extension to 0 degrees  -SP     STG 3 Patient reports decreased pain with light household ADLs to <3/10  -SP     STG 4 Patient reports that she is able to sleep through night without waking due to pain  -SP            Long Term Goals    LTG Date to Achieve 02/03/22  -SP     LTG 1 Patient demonstrates right shoulder flexion AROM to >120 degrees  -SP     LTG 2 Patient exhibits increased strength right UE by one muscle grade to better tolerate ADLs  -SP     LTG 3 Patient reports decreased pain with home and community ADLs to 0-1/10 level  -SP     LTG 4 Patient independent with HEP  -SP            Time Calculation    PT Goal Re-Cert Due Date 02/03/22  -SP           User Key  (r) = Recorded By, (t) = Taken By, (c) = Cosigned By    Initials Name Provider Type    Chantell Coley, PT Physical Therapist                 PT Assessment/Plan     Row Name 01/04/22 1109          PT Assessment    Functional Limitations Limitation in home management; Limitations in community activities; Performance in self-care ADL; Performance in leisure activities; Limitations in functional capacity and performance  -SP     Assessment Comments  Patient with history of right shoulder fracture and subsequent right reverse shoulder arthroplasty on 5-14-21, after which patient had shoulder instability  requiring revision reverse total shoulder.  Patient improved with PT, but now returns with increased pain, decreased right shoulder ROM, weakness, instability, and limited ability to perform home and community ADLs  -SP     Please refer to paper survey for additional self-reported information Yes  -SP     Rehab Potential Fair  -SP     Patient/caregiver participated in establishment of treatment plan and goals Yes  -SP     Patient would benefit from skilled therapy intervention Yes  -SP            PT Plan    PT Frequency 2x/week  -SP     Predicted Duration of Therapy Intervention (PT) 4-6 weeks  -SP           User Key  (r) = Recorded By, (t) = Taken By, (c) = Cosigned By    Initials Name Provider Type    Chantell Coley, PT Physical Therapist                 Modalities     Row Name 01/04/22 0900             Ice    Ice Applied Yes  with IFC  -SP      Location right shoulder  -SP      PT Ice Rx Minutes 15  -SP      Ice S/P Rx Yes  -SP              ELECTRICAL STIMULATION    Attended/Unattended Unattended  -SP      Stimulation Type IFC  -SP      Location/Electrode Placement/Other right shoulder  -SP            User Key  (r) = Recorded By, (t) = Taken By, (c) = Cosigned By    Initials Name Provider Type    Chantell Coley, PT Physical Therapist               OP Exercises     Row Name 01/04/22 0900             Exercise 1    Exercise Name 1 supine shoulder AAROM flexion clasp hands  -SP      Cueing 1 Verbal; Demo  -SP      Reps 1 10  -SP              Exercise 2    Exercise Name 2 supine shoulder AAROM flexion with cane  -SP      Cueing 2 Verbal; Demo  -SP      Reps 2 10  -SP              Exercise 3    Exercise Name 3 table slide flexion  -SP      Cueing 3 Verbal; Demo  -SP      Reps 3 10  -SP      Time 3 5 sec  -SP              Exercise 4     Exercise Name 4 sidelying shoulder abduction  -SP      Cueing 4 Verbal; Demo  -SP      Reps 4 10  -SP              Exercise 5    Exercise Name 5 pulleys: flex  -SP      Cueing 5 Verbal  -SP      Time 5 4 min  -SP            User Key  (r) = Recorded By, (t) = Taken By, (c) = Cosigned By    Initials Name Provider Type    Chantell Coley, PT Physical Therapist              Manual Rx (last 36 hours)     Manual Treatments     Row Name 01/04/22 0900             Manual Rx 1    Manual Rx 1 Location right shoulder  -SP      Manual Rx 1 Type PROM right shoulder into all planes 10 x each  -SP      Manual Rx 1 Duration 10min  -SP            User Key  (r) = Recorded By, (t) = Taken By, (c) = Cosigned By    Initials Name Provider Type    Chantell Coley, TABITHA Physical Therapist                            Outcome Measure Options: Quick DASH  Quick DASH  Open a tight or new jar.: Unable  Do heavy household chores (e.g., wash walls, wash floors): No Difficulty  Carry a shopping bag or briefcase: Unable  Wash your back: No Difficulty  Use a knife to cut food: Mild Difficulty  Recreational activities in which you take some force or impact through your arm, should or hand (e.g. golf, hammering, tennis, etc.): Unable  During the past week, to what extent has your arm, shoulder, or hand problem interfered with your normal social activites with family, friends, neighbors or groups?: Slightly  During the past week, were you limited in your work or other regular daily activities as a result of your arm, shoulder or hand problem?: Very limited  Arm, Shoulder, or hand pain: Moderate  Tingling (pins and needles) in your arm, shoulder, or hand: Moderate  During the past week, how much difficulty have you had sleeping because of the pain in your arm, shoulder or hand?: Moderate Difficiculty  Number of Questions Answered: 11  Quick DASH Score: 52.27         Time Calculation:     Start Time: 0855  Stop Time: 1000  Time  Calculation (min): 65 min  Untimed Charges  PT Eval/Re-eval Minutes: 60  PT Ice Rx Minutes: 15  Total Minutes  Untimed Charges Total Minutes: 60   Total Minutes: 60     Therapy Charges for Today     Code Description Service Date Service Provider Modifiers Qty    94068877420 HC PT EVAL LOW COMPLEXITY 4 1/4/2022 Chantell Denton, PT GP 1          PT G-Codes  Outcome Measure Options: Quick DASH  Quick DASH Score: 52.27         Chantell Denton, PT  1/4/2022

## 2022-01-06 ENCOUNTER — APPOINTMENT (OUTPATIENT)
Dept: PHYSICAL THERAPY | Facility: HOSPITAL | Age: 77
End: 2022-01-06

## 2022-01-11 ENCOUNTER — HOSPITAL ENCOUNTER (OUTPATIENT)
Dept: PHYSICAL THERAPY | Facility: HOSPITAL | Age: 77
Setting detail: THERAPIES SERIES
Discharge: HOME | End: 2022-01-11

## 2022-01-11 DIAGNOSIS — Z96.611 S/P REVERSE TOTAL SHOULDER ARTHROPLASTY, RIGHT: Primary | ICD-10-CM

## 2022-01-11 DIAGNOSIS — Z96.612 INSTABILITY OF REVERSE TOTAL ARTHROPLASTY OF LEFT SHOULDER: ICD-10-CM

## 2022-01-11 DIAGNOSIS — T84.028A INSTABILITY OF REVERSE TOTAL ARTHROPLASTY OF LEFT SHOULDER: ICD-10-CM

## 2022-01-11 PROCEDURE — 97140 MANUAL THERAPY 1/> REGIONS: CPT | Performed by: PHYSICAL THERAPIST

## 2022-01-11 PROCEDURE — 97110 THERAPEUTIC EXERCISES: CPT | Performed by: PHYSICAL THERAPIST

## 2022-01-11 PROCEDURE — G0283 ELEC STIM OTHER THAN WOUND: HCPCS | Performed by: PHYSICAL THERAPIST

## 2022-01-11 NOTE — THERAPY TREATMENT NOTE
Outpatient Physical Therapy Ortho Treatment Note   Magui Drew     Patient Name: Britta Armijo  : 1945  MRN: [id number]  Today's Date: 2022      Visit Date: 2022    Visit Dx:    ICD-10-CM ICD-9-CM   1. S/P reverse total shoulder arthroplasty, right  Z96.611 V43.61   2. 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)   • Gross hematuria        Past Medical History:   Diagnosis Date   • Arthritis of back     NECK & LOWER BACK   • COVID-19 vaccine series completed    • Fracture, humerus    • Frequent urinary tract infections     SCHEDULED FOR BLADDER BIOPSY   • Generalized anxiety disorder with panic attacks    • GERD (gastroesophageal reflux disease)    • Gross hematuria 2021   • Heart attack (HCC)     YEARS AGO   • Hyperlipidemia    • Hypertension    • Lumbosacral disc disease    • MRSA (methicillin resistant staph aureus) culture positive 2018    POSITIVE NASAL SWAB PRIOR TO HIP SURGERY   • Osteoporosis    • Right shoulder pain     SCHEDULED FOR TOTAL SHOULDER   • Unable to read or write     UNABLE TO READ        Past Surgical History:   Procedure Laterality Date   • APPENDECTOMY     • BREAST BIOPSY Left     benign   • BREAST LUMPECTOMY Left     BENIGN   • BREAST SURGERY  2020   • CATARACT EXTRACTION, BILATERAL     • CHOLECYSTECTOMY OPEN     • CYSTOSCOPY BLADDER BIOPSY N/A 2021    Procedure: CYSTOSCOPY BLADDER BIOPSY;  Surgeon: Segundo Sprague MD;  Location: [facility];  Service: Urology;  Laterality: N/A;   • EPIDURAL BLOCK     • HEMORRHOIDECTOMY      X4   • HIP SURGERY Right     Replacement   • HYSTERECTOMY     •  SHOULDER MANIPULATION Right 5/14/2021    Procedure: REVERSE TOTAL SHOULDER MANIPULATION;  Surgeon: Derrick Pelayo MD;  Location:  [facility];  Service: Orthopedics;  Laterality: Right;   • TOTAL SHOULDER ARTHROPLASTY W/ DISTAL CLAVICLE EXCISION Right 3/22/2021    Procedure: TOTAL SHOULDER REVERSE ARTHROPLASTY;  Surgeon: Derrick Pelayo MD;  Location:  [facility];  Service: Orthopedics;  Laterality: Right;  TOTAL SHOULDER REVERSE ARTHROPLASTY   • TUMOR REMOVAL  2017    RIGHT ARM         PT Ortho     Row Name 01/11/22 1200       Subjective Comments    Subjective Comments Patient reports that she continues to have elevated pain in right shoulder to 10/10 level.  Patient states that she continues to do exercises at home.  -SP          User Key  (r) = Recorded By, (t) = Taken By, (c) = Cosigned By    Initials Name Provider Type    Chantell Coley, PT Physical Therapist                             PT Assessment/Plan     Row Name 01/11/22 1518          PT Assessment    Assessment Comments Patient with fair tolerance for PROM.  Despite reports of 10/10 pain in right shoulder, patient tolerates ROM and strengthening exercises well.  Patient requires close surpervision for technique with exercise  -SP            PT Plan    PT Plan Comments Continue to progress right shoulder ROM and strengthening  -SP           User Key  (r) = Recorded By, (t) = Taken By, (c) = Cosigned By    Initials Name Provider Type    Chantell Coley, PT Physical Therapist                 Modalities     Row Name 01/11/22 1200             Moist Heat    MH Applied Yes  patient seated  -SP      Location right shoulder  -SP      PT Moist Heat Minutes 10  -SP      MH Prior to Rx Yes  -SP              Ice    Ice Applied Yes  with IFC  -SP      Location right shoulder  -SP      PT Ice Rx Minutes 15  -SP      Ice S/P Rx Yes  -SP              ELECTRICAL STIMULATION    Attended/Unattended Unattended  -SP      Stimulation Type IFC  -SP       Location/Electrode Placement/Other right shoulder  -SP            User Key  (r) = Recorded By, (t) = Taken By, (c) = Cosigned By    Initials Name Provider Type    Chantell Coley, PT Physical Therapist               OP Exercises     Row Name 01/11/22 1523 01/11/22 1200          Subjective Comments    Subjective Comments -- Patient reports that she continues to have elevated pain in right shoulder to 10/10 level.  Patient states that she continues to do exercises at home.  -SP            Total Minutes    32236 - PT Therapeutic Exercise Minutes 15  -SP --     78035 - PT Manual Therapy Minutes 15  -SP --            Exercise 1    Exercise Name 1 -- supine shoulder AAROM flexion clasp hands  -SP     Cueing 1 -- Verbal; Demo  -SP     Reps 1 -- 10  -SP            Exercise 2    Exercise Name 2 -- supine shoulder AAROM flexion with cane  -SP     Cueing 2 -- Verbal; Demo  -SP     Reps 2 -- 10  -SP            Exercise 3    Exercise Name 3 -- table slide flexion  -SP     Cueing 3 -- Verbal; Demo  -SP     Reps 3 -- 10  -SP     Time 3 -- 5 sec  -SP            Exercise 4    Exercise Name 4 -- sidelying shoulder abduction  -SP     Cueing 4 -- Verbal; Demo  -SP     Reps 4 -- 15  -SP            Exercise 5    Exercise Name 5 -- pulleys: flex  -SP     Cueing 5 -- Verbal  -SP     Time 5 -- 4 min  -SP            Exercise 6    Exercise Name 6 -- scap retraction  -SP     Cueing 6 -- Verbal; Demo  -SP     Reps 6 -- 15  -SP            Exercise 7    Exercise Name 7 -- tband shoulder extension  -SP     Cueing 7 -- Verbal; Demo  -SP     Reps 7 -- 10  -SP     Time 7 -- yellow tband  -SP            Exercise 8    Exercise Name 8 -- tband shoulder abduction  -SP     Cueing 8 -- Verbal; Demo  -SP     Reps 8 -- 10  -SP     Time 8 -- yellow tband  -SP            Exercise 9    Exercise Name 9 -- tband shoulder flex  -SP     Cueing 9 -- Verbal; Demo  -SP     Reps 9 -- 10  -SP     Time 9 -- yellow tband  -SP           User Key  (r) =  Recorded By, (t) = Taken By, (c) = Cosigned By    Initials Name Provider Type    SP Chantell Denton, PT Physical Therapist                         Manual Rx (last 36 hours)     Manual Treatments     Row Name 01/11/22 1523 01/11/22 1200          Total Minutes    14075 - PT Manual Therapy Minutes 15  -SP --            Manual Rx 1    Manual Rx 1 Location -- right shoulder  -SP     Manual Rx 1 Type -- PROM right shoulder into all planes 10 x each  -SP     Manual Rx 1 Duration -- 10min  -SP           User Key  (r) = Recorded By, (t) = Taken By, (c) = Cosigned By    Initials Name Provider Type    Chantell Coley, PT Physical Therapist                    Therapy Education  Given: HEP  Program: Reinforced  How Provided: Verbal, Written  Provided to: Patient  Level of Understanding: Verbalized, Demonstrated              Time Calculation:   Start Time: 1120  Stop Time: 1230  Time Calculation (min): 70 min  Timed Charges  75830 - PT Therapeutic Exercise Minutes: 15  54095 - PT Manual Therapy Minutes: 15  Untimed Charges  PT E-Stim Unattended Minutes: 15  PT Moist Heat Minutes: 10  PT Ice Rx Minutes: 15  Total Minutes  Timed Charges Total Minutes: 30  Untimed Charges Total Minutes: 40   Total Minutes: 40  Therapy Charges for Today     Code Description Service Date Service Provider Modifiers Qty    82309913324 HC PT THER PROC EA 15 MIN 1/11/2022 Chantell Denton, PT GP 1    65940247415 HC PT MANUAL THERAPY EA 15 MIN 1/11/2022 Chantell Denton, PT GP 1    87997554622 HC PT ELECTRICAL STIM UNATTENDED 1/11/2022 Chantell Denton, PT  1                    Chantell Denton, PT  1/11/2022

## 2022-01-13 ENCOUNTER — DOCUMENTATION (OUTPATIENT)
Dept: PHYSICAL THERAPY | Facility: HOSPITAL | Age: 77
End: 2022-01-13

## 2022-01-13 ENCOUNTER — APPOINTMENT (OUTPATIENT)
Dept: PHYSICAL THERAPY | Facility: HOSPITAL | Age: 77
End: 2022-01-13

## 2022-01-17 ENCOUNTER — APPOINTMENT (OUTPATIENT)
Dept: PHYSICAL THERAPY | Facility: HOSPITAL | Age: 77
End: 2022-01-17

## 2022-01-18 ENCOUNTER — HOSPITAL ENCOUNTER (OUTPATIENT)
Dept: PHYSICAL THERAPY | Facility: HOSPITAL | Age: 77
Setting detail: THERAPIES SERIES
Discharge: HOME | End: 2022-01-18

## 2022-01-18 DIAGNOSIS — T84.028A INSTABILITY OF REVERSE TOTAL ARTHROPLASTY OF LEFT SHOULDER: ICD-10-CM

## 2022-01-18 DIAGNOSIS — Z96.612 INSTABILITY OF REVERSE TOTAL ARTHROPLASTY OF LEFT SHOULDER: ICD-10-CM

## 2022-01-18 DIAGNOSIS — Z96.611 S/P REVERSE TOTAL SHOULDER ARTHROPLASTY, RIGHT: Primary | ICD-10-CM

## 2022-01-18 PROCEDURE — 97140 MANUAL THERAPY 1/> REGIONS: CPT | Performed by: PHYSICAL THERAPIST

## 2022-01-18 PROCEDURE — G0283 ELEC STIM OTHER THAN WOUND: HCPCS | Performed by: PHYSICAL THERAPIST

## 2022-01-18 PROCEDURE — 97110 THERAPEUTIC EXERCISES: CPT | Performed by: PHYSICAL THERAPIST

## 2022-01-18 NOTE — THERAPY TREATMENT NOTE
Outpatient Physical Therapy Ortho Treatment Note   Magui Drew     Patient Name: Britta Armijo  : 1945  MRN: [id number]  Today's Date: 2022      Visit Date: 2022    Visit Dx:    ICD-10-CM ICD-9-CM   1. S/P reverse total shoulder arthroplasty, right  Z96.611 V43.61   2. 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)   • Gross hematuria        Past Medical History:   Diagnosis Date   • Arthritis of back     NECK & LOWER BACK   • COVID-19 vaccine series completed    • Fracture, humerus    • Frequent urinary tract infections     SCHEDULED FOR BLADDER BIOPSY   • Generalized anxiety disorder with panic attacks    • GERD (gastroesophageal reflux disease)    • Gross hematuria 2021   • Heart attack (HCC)     YEARS AGO   • Hyperlipidemia    • Hypertension    • Lumbosacral disc disease    • MRSA (methicillin resistant staph aureus) culture positive 2018    POSITIVE NASAL SWAB PRIOR TO HIP SURGERY   • Osteoporosis    • Right shoulder pain     SCHEDULED FOR TOTAL SHOULDER   • Unable to read or write     UNABLE TO READ        Past Surgical History:   Procedure Laterality Date   • APPENDECTOMY     • BREAST BIOPSY Left     benign   • BREAST LUMPECTOMY Left     BENIGN   • BREAST SURGERY  2020   • CATARACT EXTRACTION, BILATERAL     • CHOLECYSTECTOMY OPEN     • CYSTOSCOPY BLADDER BIOPSY N/A 2021    Procedure: CYSTOSCOPY BLADDER BIOPSY;  Surgeon: Segundo Sprague MD;  Location: [facility];  Service: Urology;  Laterality: N/A;   • EPIDURAL BLOCK     • HEMORRHOIDECTOMY      X4   • HIP SURGERY Right     Replacement   • HYSTERECTOMY     •  SHOULDER MANIPULATION Right 5/14/2021    Procedure: REVERSE TOTAL SHOULDER MANIPULATION;  Surgeon: Derrick Pelayo MD;  Location:  [facility];  Service: Orthopedics;  Laterality: Right;   • TOTAL SHOULDER ARTHROPLASTY W/ DISTAL CLAVICLE EXCISION Right 3/22/2021    Procedure: TOTAL SHOULDER REVERSE ARTHROPLASTY;  Surgeon: Derrick Pelayo MD;  Location:  [facility];  Service: Orthopedics;  Laterality: Right;  TOTAL SHOULDER REVERSE ARTHROPLASTY   • TUMOR REMOVAL  2017    RIGHT ARM         PT Ortho     Row Name 01/18/22 0800       Subjective Comments    Subjective Comments Patient reports that she continues to have right shoulder pain but it is not as bad today as it was last visit.  Patient reports that she has been doing exercises at home  -SP          User Key  (r) = Recorded By, (t) = Taken By, (c) = Cosigned By    Initials Name Provider Type    Chantell Coley, PT Physical Therapist                             PT Assessment/Plan     Row Name 01/18/22 0905          PT Assessment    Assessment Comments Patient with better tolerance for manual therapy today.  Patient tolerates progression of ther-ex well  -SP            PT Plan    PT Plan Comments Continue to progress as tolerable  -SP           User Key  (r) = Recorded By, (t) = Taken By, (c) = Cosigned By    Initials Name Provider Type    Chantell Coley, PT Physical Therapist                 Modalities     Row Name 01/18/22 0800             Moist Heat    MH Applied Yes  -SP      Location right shoulder  -SP      PT Moist Heat Minutes 10  -SP      MH Prior to Rx Yes  -SP              Ice    Location right shoulder  -SP      PT Ice Rx Minutes 15  -SP      Ice S/P Rx Yes  -SP              ELECTRICAL STIMULATION    Attended/Unattended Unattended  -SP      Stimulation Type IFC  -SP      Location/Electrode Placement/Other right shoulder  -SP            User Key  (r) = Recorded By, (t) = Taken By, (c) = Cosigned By    Initials Name Provider  Type    Chantell Coley, PT Physical Therapist               OP Exercises     Row Name 01/18/22 0916 01/18/22 0800          Subjective Comments    Subjective Comments -- Patient reports that she continues to have right shoulder pain but it is not as bad today as it was last visit.  Patient reports that she has been doing exercises at home  -SP            Total Minutes    23624 - PT Therapeutic Exercise Minutes 15  -SP --     70488 - PT Manual Therapy Minutes 20  -SP --            Exercise 1    Exercise Name 1 -- supine shoulder AAROM flexion clasp hands  -SP     Cueing 1 -- Verbal; Demo  -SP     Reps 1 -- 15  -SP            Exercise 2    Exercise Name 2 -- supine shoulder AAROM flexion with cane  -SP     Cueing 2 -- Verbal; Demo  -SP     Reps 2 -- 15  -SP            Exercise 3    Exercise Name 3 -- table slide flexion  -SP     Cueing 3 -- Verbal; Demo  -SP     Reps 3 -- 10  -SP     Time 3 -- 5 sec  -SP            Exercise 4    Exercise Name 4 -- sidelying shoulder abduction  -SP     Cueing 4 -- Verbal; Demo  -SP     Reps 4 -- 15  -SP            Exercise 5    Exercise Name 5 -- pulleys: flex  -SP     Cueing 5 -- Verbal  -SP     Time 5 -- 4 min  -SP            Exercise 6    Exercise Name 6 -- scap retraction  -SP     Cueing 6 -- Verbal; Demo  -SP     Reps 6 -- 20  -SP            Exercise 7    Exercise Name 7 -- tband shoulder extension  -SP     Cueing 7 -- Verbal; Demo  -SP     Reps 7 -- 20  -SP     Time 7 -- yellow tband  -SP            Exercise 8    Exercise Name 8 -- tband shoulder abduction  -SP     Cueing 8 -- Verbal; Demo  -SP     Reps 8 -- 20  -SP     Time 8 -- yellow tband  -SP            Exercise 9    Exercise Name 9 -- tband shoulder flex  -SP     Cueing 9 -- Verbal; Demo  -SP     Reps 9 -- 20  -SP     Time 9 -- yellow tband  -SP           User Key  (r) = Recorded By, (t) = Taken By, (c) = Cosigned By    Initials Name Provider Type    Chantell Coley, PT Physical Therapist                          Manual Rx (last 36 hours)     Manual Treatments     Row Name 01/18/22 0916 01/18/22 0800          Total Minutes    40676 - PT Manual Therapy Minutes 20  -SP --            Manual Rx 1    Manual Rx 1 Location -- right shoulder  -SP     Manual Rx 1 Type -- PROM right shoulder into all planes 10 x each  -SP     Manual Rx 1 Duration -- 10min  -SP            Manual Rx 2    Manual Rx 2 Location -- right shoulder  -SP     Manual Rx 2 Type -- AROM shoulder flexion in limited range appr 120 to 75 degrees; therapist guided 5 reps x 3 sets  -SP     Manual Rx 2 Duration -- 5 min  -SP            Manual Rx 3    Manual Rx 3 Location -- right shoulder  -SP     Manual Rx 3 Type -- dynamic stabilization 15 sec x 4  -SP     Manual Rx 3 Duration -- 5 min  -SP           User Key  (r) = Recorded By, (t) = Taken By, (c) = Cosigned By    Initials Name Provider Type    Chantell Coley, PT Physical Therapist                    Therapy Education  Given: HEP  Program: Reinforced, Progressed  How Provided: Verbal  Provided to: Patient  Level of Understanding: Verbalized, Demonstrated              Time Calculation:   Start Time: 0755  Stop Time: 0908  Time Calculation (min): 73 min  Timed Charges  12243 - PT Therapeutic Exercise Minutes: 15  42789 - PT Manual Therapy Minutes: 20  Untimed Charges  PT E-Stim Unattended Minutes: 15  PT Moist Heat Minutes: 10  PT Ice Rx Minutes: 15  Total Minutes  Timed Charges Total Minutes: 35  Untimed Charges Total Minutes: 40   Total Minutes: 75  Therapy Charges for Today     Code Description Service Date Service Provider Modifiers Qty    90088165190 HC PT THER PROC EA 15 MIN 1/18/2022 Chantell Denton, PT GP 1    79348036562 HC PT MANUAL THERAPY EA 15 MIN 1/18/2022 Chantell Denton, PT GP 1    53985558177 HC PT ELECTRICAL STIM UNATTENDED 1/18/2022 Chantell Denton, PT  1                    Chantell Denton, PT  1/18/2022

## 2022-01-20 ENCOUNTER — DOCUMENTATION (OUTPATIENT)
Dept: PHYSICAL THERAPY | Facility: HOSPITAL | Age: 77
End: 2022-01-20

## 2022-01-20 ENCOUNTER — APPOINTMENT (OUTPATIENT)
Dept: PHYSICAL THERAPY | Facility: HOSPITAL | Age: 77
End: 2022-01-20

## 2022-01-26 ENCOUNTER — HOSPITAL ENCOUNTER (OUTPATIENT)
Dept: PHYSICAL THERAPY | Facility: HOSPITAL | Age: 77
Setting detail: THERAPIES SERIES
Discharge: HOME | End: 2022-01-26

## 2022-01-26 DIAGNOSIS — Z96.611 S/P REVERSE TOTAL SHOULDER ARTHROPLASTY, RIGHT: Primary | ICD-10-CM

## 2022-01-26 PROCEDURE — 97110 THERAPEUTIC EXERCISES: CPT

## 2022-01-26 PROCEDURE — 97140 MANUAL THERAPY 1/> REGIONS: CPT

## 2022-01-26 NOTE — THERAPY TREATMENT NOTE
Outpatient Physical Therapy Ortho Treatment Note  EDWARD Ramirez     Patient Name: Britta Armijo  : 1945  MRN: [id number]  Today's Date: 2022      Visit Date: 2022    Visit Dx:    ICD-10-CM ICD-9-CM   1. 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)   • Gross hematuria        Past Medical History:   Diagnosis Date   • Arthritis of back     NECK & LOWER BACK   • COVID-19 vaccine series completed    • Fracture, humerus    • Frequent urinary tract infections     SCHEDULED FOR BLADDER BIOPSY   • Generalized anxiety disorder with panic attacks    • GERD (gastroesophageal reflux disease)    • Gross hematuria 2021   • Heart attack (HCC)     YEARS AGO   • Hyperlipidemia    • Hypertension    • Lumbosacral disc disease    • MRSA (methicillin resistant staph aureus) culture positive 2018    POSITIVE NASAL SWAB PRIOR TO HIP SURGERY   • Osteoporosis    • Right shoulder pain     SCHEDULED FOR TOTAL SHOULDER   • Unable to read or write     UNABLE TO READ        Past Surgical History:   Procedure Laterality Date   • APPENDECTOMY     • BREAST BIOPSY Left     benign   • BREAST LUMPECTOMY Left     BENIGN   • BREAST SURGERY  2020   • CATARACT EXTRACTION, BILATERAL     • CHOLECYSTECTOMY OPEN     • CYSTOSCOPY BLADDER BIOPSY N/A 2021    Procedure: CYSTOSCOPY BLADDER BIOPSY;  Surgeon: Segundo Sprague MD;  Location: [facility];  Service: Urology;  Laterality: N/A;   • EPIDURAL BLOCK     • HEMORRHOIDECTOMY      X4   • HIP SURGERY Right     Replacement   • HYSTERECTOMY     • SHOULDER MANIPULATION Right 2021    Procedure: REVERSE TOTAL SHOULDER MANIPULATION;  Surgeon: Pierce  Derrick BOSWELL MD;  Location:  [facility];  Service: Orthopedics;  Laterality: Right;   • TOTAL SHOULDER ARTHROPLASTY W/ DISTAL CLAVICLE EXCISION Right 3/22/2021    Procedure: TOTAL SHOULDER REVERSE ARTHROPLASTY;  Surgeon: Derrick Pelayo MD;  Location:  [facility];  Service: Orthopedics;  Laterality: Right;  TOTAL SHOULDER REVERSE ARTHROPLASTY   • TUMOR REMOVAL  2017    RIGHT ARM         PT Ortho     Row Name 01/26/22 1500       Subjective Comments    Subjective Comments pt feels the cold weather is causing her (R) shoulder to ache more today but continues to feel she is improving since starting therapy and reports she is having less pain with her ex's  -          User Key  (r) = Recorded By, (t) = Taken By, (c) = Cosigned By    Initials Name Provider Type    Gatito Fairbanks PTA Physical Therapy Assistant                             PT Assessment/Plan     Row Name 01/26/22 1546          PT Assessment    Assessment Comments pt tolerated PROM well with minimal discomfort reported only with flexion; needing cues throughout theraband ex's but tolerates well; pt declined post Rx modalities  -            PT Plan    PT Plan Comments Cont per POC  -           User Key  (r) = Recorded By, (t) = Taken By, (c) = Cosigned By    Initials Name Provider Type    Gatito Fairbanks PTA Physical Therapy Assistant                 Modalities     Row Name 01/26/22 1500             Moist Heat    Location right shoulder - pt seated w/pillow support  -      PT Moist Heat Minutes 15  -      MH Prior to Rx Yes  -              Ice    Ice Applied No  -      Patient denies application of Ice Yes  -            User Key  (r) = Recorded By, (t) = Taken By, (c) = Cosigned By    Initials Name Provider Type    Gatito Fairbanks PTA Physical Therapy Assistant               OP Exercises     Row Name 01/26/22 1547 01/26/22 1500          Subjective Comments    Subjective Comments -- pt feels the cold weather is causing her (R)  shoulder to ache more today but continues to feel she is improving since starting therapy and reports she is having less pain with her ex's  -            Total Minutes    18672 - PT Therapeutic Exercise Minutes 20  -MH --     82442 - PT Manual Therapy Minutes 15  -MH --            Exercise 1    Exercise Name 1 -- supine shoulder AAROM flexion clasp hands  -MH     Cueing 1 -- Verbal; Demo  -     Reps 1 -- 15  -MH            Exercise 2    Exercise Name 2 -- supine shoulder AAROM flexion with cane  -MH     Cueing 2 -- Verbal; Demo  -     Reps 2 -- 15  -MH            Exercise 3    Exercise Name 3 -- table slide flexion  -MH     Cueing 3 -- Verbal; Demo  -     Reps 3 -- 10  -MH     Time 3 -- 5 sec  -MH            Exercise 4    Exercise Name 4 -- sidelying shoulder abduction  -MH     Cueing 4 -- Verbal; Demo  -     Reps 4 -- 15  -MH            Exercise 5    Exercise Name 5 -- pulleys: flex  -MH     Cueing 5 -- Verbal  -     Time 5 -- 4 min  -            Exercise 6    Exercise Name 6 -- scap retraction  -MH     Cueing 6 -- Verbal; Demo  -     Reps 6 -- 20  -MH            Exercise 7    Exercise Name 7 -- tband shoulder extension  -MH     Cueing 7 -- Verbal; Demo  -     Reps 7 -- 20  -MH     Time 7 -- yellow tband  -MH            Exercise 8    Exercise Name 8 -- tband shoulder abduction  -MH     Cueing 8 -- Verbal; Demo  -     Reps 8 -- 20  -MH     Time 8 -- yellow tband  -MH            Exercise 9    Exercise Name 9 -- tband shoulder flex  -MH     Cueing 9 -- Verbal; Demo  -     Reps 9 -- 20  -MH     Time 9 -- yellow tband  -MH           User Key  (r) = Recorded By, (t) = Taken By, (c) = Cosigned By    Initials Name Provider Type     Gatito Christie, PTA Physical Therapy Assistant                         Manual Rx (last 36 hours)     Manual Treatments     Row Name 01/26/22 1547 01/26/22 1500          Total Minutes    57152 - PT Manual Therapy Minutes 15  -MH --            Manual Rx 1    Manual Rx 1  Location -- right shoulder  -     Manual Rx 1 Type -- PROM right shoulder into all planes 10 x each  -     Manual Rx 1 Duration -- 10min  -            Manual Rx 2    Manual Rx 2 Location -- right shoulder  -     Manual Rx 2 Type -- AROM shoulder flexion in limited range appr 120 to 75 degrees; therapist guided 5 reps x 3 sets  -     Manual Rx 2 Duration -- 5 min  -            Manual Rx 3    Manual Rx 3 Location -- right shoulder  -     Manual Rx 3 Type -- dynamic stabilization 15 sec x 4  -     Manual Rx 3 Duration -- 5 min  -           User Key  (r) = Recorded By, (t) = Taken By, (c) = Cosigned By    Initials Name Provider Type     Gatito Christie PTA Physical Therapy Assistant                    Therapy Education  Given: HEP, Symptoms/condition management  Program: Reinforced  How Provided: Verbal, Demonstration  Provided to: Patient  Level of Understanding: Teach back education performed, Verbalized, Demonstrated              Time Calculation:   Start Time: 1355  Stop Time: 1450  Time Calculation (min): 55 min  Timed Charges  29090 - PT Therapeutic Exercise Minutes: 20  87457 - PT Manual Therapy Minutes: 15  Untimed Charges  PT Moist Heat Minutes: 15  Total Minutes  Timed Charges Total Minutes: 35  Untimed Charges Total Minutes: 15   Total Minutes: 35  Therapy Charges for Today     Code Description Service Date Service Provider Modifiers Qty    47809041748 HC PT THER PROC EA 15 MIN 1/26/2022 Gatito Christie PTA GP 1    57057658963 HC PT MANUAL THERAPY EA 15 MIN 1/26/2022 Gatito Christie PTA GP 1                    Gatito Christie PTA  1/26/2022

## 2022-01-28 ENCOUNTER — DOCUMENTATION (OUTPATIENT)
Dept: PHYSICAL THERAPY | Facility: HOSPITAL | Age: 77
End: 2022-01-28

## 2022-01-28 ENCOUNTER — APPOINTMENT (OUTPATIENT)
Dept: PHYSICAL THERAPY | Facility: HOSPITAL | Age: 77
End: 2022-01-28

## 2022-02-01 ENCOUNTER — APPOINTMENT (OUTPATIENT)
Dept: PHYSICAL THERAPY | Facility: HOSPITAL | Age: 77
End: 2022-02-01

## 2022-03-10 ENCOUNTER — OFFICE VISIT (OUTPATIENT)
Dept: ORTHOPEDIC SURGERY | Facility: CLINIC | Age: 77
End: 2022-03-10

## 2022-03-10 VITALS — BODY MASS INDEX: 28.35 KG/M2 | WEIGHT: 160 LBS | HEIGHT: 63 IN

## 2022-03-10 DIAGNOSIS — T84.028A INSTABILITY OF REVERSE TOTAL ARTHROPLASTY OF RIGHT SHOULDER: ICD-10-CM

## 2022-03-10 DIAGNOSIS — Z96.611 INSTABILITY OF REVERSE TOTAL ARTHROPLASTY OF RIGHT SHOULDER: ICD-10-CM

## 2022-03-10 DIAGNOSIS — Z96.611 S/P REVERSE TOTAL SHOULDER ARTHROPLASTY, RIGHT: ICD-10-CM

## 2022-03-10 DIAGNOSIS — Z96.611 STATUS POST REVERSE ARTHROPLASTY OF RIGHT SHOULDER: Primary | ICD-10-CM

## 2022-03-10 PROCEDURE — 99212 OFFICE O/P EST SF 10 MIN: CPT | Performed by: ORTHOPAEDIC SURGERY

## 2022-03-10 NOTE — PROGRESS NOTES
The patient has consented to being recorded using [employer].  Subjective:     Patient ID: Britta Armijo is a 76 y.o. female.    Chief Complaint:  F/u s/p closed reduction right reverse shoulder arthroplasty DOS 5/14/2021  S/p right reverse TSA 3/22/21  Plan last visit-prednisone taper, physical therapy, reevaluate for possible CT scan    History of Present Illness  Britta Armijo returns to clinic today for evaluation of right shoulder pain.     She reports being well and she is currently in physical therapy. The patient reports utilizing thera-band for exercises at home. She reports that she utilizes 2 capsules of aspirins and 2 capsules of Tylenol today as relief for her pain.     She had good improvement with previous steroid taper and physical therapy with strengthening. She believes that her functional motion has significantly improved at this time. She denies any significant pain to her right shoulder.     The patient reports that she is unable to tolerate Aleve.        Social History     Occupational History   • Not on file   Tobacco Use   • Smoking status: Never Smoker   • Smokeless tobacco: Never Used   Vaping Use   • Vaping Use: Never used   Substance and Sexual Activity   • Alcohol use: Never   • Drug use: Never   • Sexual activity: Defer      Past Medical History:   Diagnosis Date   • Arthritis of back     NECK & LOWER BACK   • COVID-19 vaccine series completed    • Fracture, humerus    • Frequent urinary tract infections     SCHEDULED FOR BLADDER BIOPSY   • Generalized anxiety disorder with panic attacks    • GERD (gastroesophageal reflux disease)    • Gross hematuria 8/25/2021   • Heart attack (HCC)     YEARS AGO   • Hyperlipidemia    • Hypertension    • Lumbosacral disc disease    • MRSA (methicillin resistant staph aureus) culture positive 2018    POSITIVE NASAL SWAB PRIOR TO HIP SURGERY   • Osteoporosis    • Right shoulder pain     SCHEDULED FOR TOTAL SHOULDER   • Unable to read or write     UNABLE TO  READ     Past Surgical History:   Procedure Laterality Date   • APPENDECTOMY     • BREAST BIOPSY Left 2020    benign   • BREAST LUMPECTOMY Left     BENIGN   • BREAST SURGERY  07/03/2020   • CATARACT EXTRACTION, BILATERAL     • CHOLECYSTECTOMY OPEN     • CYSTOSCOPY BLADDER BIOPSY N/A 8/25/2021    Procedure: CYSTOSCOPY BLADDER BIOPSY;  Surgeon: Segundo Sprague MD;  Location: [facility];  Service: Urology;  Laterality: N/A;   • EPIDURAL BLOCK     • HEMORRHOIDECTOMY      X4   • HIP SURGERY Right     Replacement   • HYSTERECTOMY  1991   • SHOULDER MANIPULATION Right 5/14/2021    Procedure: REVERSE TOTAL SHOULDER MANIPULATION;  Surgeon: Derrick Pelayo MD;  Location: [facility];  Service: Orthopedics;  Laterality: Right;   • TOTAL SHOULDER ARTHROPLASTY W/ DISTAL CLAVICLE EXCISION Right 3/22/2021    Procedure: TOTAL SHOULDER REVERSE ARTHROPLASTY;  Surgeon: Derrick Pelayo MD;  Location: [facility] OR;  Service: Orthopedics;  Laterality: Right;  TOTAL SHOULDER REVERSE ARTHROPLASTY   • TUMOR REMOVAL  2017    RIGHT ARM        Family History   Problem Relation Age of Onset   • Cancer Father         spine   • Diabetes Paternal Aunt    • Diabetes Paternal Uncle    • Breast cancer Maternal Aunt          Review of Systems   Constitutional: Negative for chills, diaphoresis, fever and unexpected weight change.   HENT: Negative for hearing loss, nosebleeds, sneezing, sore throat and tinnitus.    Eyes: Negative for pain and visual disturbance.   Respiratory: Negative for cough, shortness of breath and wheezing.    Cardiovascular: Negative for chest pain and palpitations.   Gastrointestinal: Negative for abdominal pain, diarrhea, nausea and vomiting.   Endocrine: Negative for cold intolerance, heat intolerance and polydipsia.   Genitourinary: Negative for difficulty urinating, dyspareunia and hematuria.   Musculoskeletal: Positive for arthralgias, joint swelling and myalgias.   Skin: Negative for rash and wound.  "  Allergic/Immunologic: Negative for environmental allergies.   Neurological: Negative for dizziness, syncope and numbness.   Hematological: Does not bruise/bleed easily.   Psychiatric/Behavioral: Negative for dysphoric mood and sleep disturbance. The patient is not nervous/anxious.            Objective:  Vitals:    03/10/22 0955   Weight: 72.6 kg (160 lb)   Height: 160 cm (62.99\")         03/10/22  0955   Weight: 72.6 kg (160 lb)     Body mass index is 28.35 kg/m².  Physical Exam    Vital signs reviewed.   General: No acute distress, alert and oriented  Eyes: conjunctiva clear; pupils equally round and reactive  ENT: external ears and nose atraumatic; oropharynx clear  CV: no peripheral edema  Resp: normal respiratory effort  Skin: no rashes or wounds; normal turgor  Psych: mood and affect appropriate; recent and remote memory intact          Ortho Exam     Right shoulder-  Anterior incision is well healed.  No joint line pain  FF- Active- 145 degrees   Strength- 4/5  ER- Active- 50 degrees  IR to the L2  Strength- 4/5  Apprehension- Negative   Positive sensation to light touch proximal lateral aspect of right arm.   2+ radial pulse right wrist.     Imaging:  None today  Assessment:        1. Status post reverse arthroplasty of right shoulder    2. 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 S/P closed reduction right reverse shoulder arthroplasty, DOS 05/14/2021            Plan:          1. Discussed treatment options at length with patient at today's visit.   2. The patient has demonstrated great improvement  with prednisone taper and physical therapy. We will continue with home exercises program to increase strength and range of motion as tolerated.   3. Follow up: 1 year with repeat x-rays of right shoulder. I had discussed with her if she has any exacerbations of pain or further issues, she will need to return to office for further evaluation as we discuss treatments at that " time      Britta Armijo was in agreement with plan and had all questions answered.     Orders:  No orders of the defined types were placed in this encounter.      Medications:  No orders of the defined types were placed in this encounter.      Followup:  Return in about 1 year (around 3/10/2023) for xrays needed at follow up.    Diagnoses and all orders for this visit:    1. Status post reverse arthroplasty of right shoulder (Primary)    2. 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 S/P closed reduction right reverse shoulder arthroplasty, DOS 05/14/2021           Dictated utilizing Dragon dictation     Transcribed from ambient dictation for Derrick Pelayo MD by Sterling Mcconnell.  03/11/22   12:15 EST    Patient verbalized consent to the visit recording.  I have personally performed the services described in this document as transcribed by the above individual, and it is both accurate and complete.  Derrick Pelayo MD  3/13/2022  00:54 EST

## 2022-08-05 ENCOUNTER — APPOINTMENT (OUTPATIENT)
Dept: CT IMAGING | Facility: HOSPITAL | Age: 77
End: 2022-08-05

## 2022-08-05 ENCOUNTER — APPOINTMENT (OUTPATIENT)
Dept: MRI IMAGING | Facility: HOSPITAL | Age: 77
End: 2022-08-05

## 2022-08-05 ENCOUNTER — APPOINTMENT (OUTPATIENT)
Dept: GENERAL RADIOLOGY | Facility: HOSPITAL | Age: 77
End: 2022-08-05

## 2022-08-05 ENCOUNTER — HOSPITAL ENCOUNTER (EMERGENCY)
Facility: HOSPITAL | Age: 77
Discharge: HOME | End: 2022-08-05
Attending: EMERGENCY MEDICINE | Admitting: EMERGENCY MEDICINE

## 2022-08-05 VITALS
WEIGHT: 153.4 LBS | HEIGHT: 62 IN | OXYGEN SATURATION: 94 % | RESPIRATION RATE: 16 BRPM | BODY MASS INDEX: 28.23 KG/M2 | HEART RATE: 64 BPM | DIASTOLIC BLOOD PRESSURE: 96 MMHG | SYSTOLIC BLOOD PRESSURE: 180 MMHG | TEMPERATURE: 98.4 F

## 2022-08-05 DIAGNOSIS — U07.1 COVID-19: Primary | ICD-10-CM

## 2022-08-05 DIAGNOSIS — G51.0 BELL'S PALSY: ICD-10-CM

## 2022-08-05 LAB
ALBUMIN SERPL-MCNC: 4.2 G/DL (ref 3.5–5.2)
ALBUMIN/GLOB SERPL: 1.3 G/DL
ALP SERPL-CCNC: 104 U/L (ref 39–117)
ALT SERPL W P-5'-P-CCNC: 10 U/L (ref 1–33)
ANION GAP SERPL CALCULATED.3IONS-SCNC: 11.2 MMOL/L (ref 5–15)
AST SERPL-CCNC: 19 U/L (ref 1–32)
BASOPHILS # BLD AUTO: 0.05 10*3/MM3 (ref 0–0.2)
BASOPHILS NFR BLD AUTO: 0.7 % (ref 0–1.5)
BILIRUB SERPL-MCNC: 0.6 MG/DL (ref 0–1.2)
BUN SERPL-MCNC: 16 MG/DL (ref 8–23)
BUN/CREAT SERPL: 14 (ref 7–25)
CALCIUM SPEC-SCNC: 9.8 MG/DL (ref 8.6–10.5)
CHLORIDE SERPL-SCNC: 99 MMOL/L (ref 98–107)
CO2 SERPL-SCNC: 28.8 MMOL/L (ref 22–29)
CREAT SERPL-MCNC: 1.14 MG/DL (ref 0.57–1)
DEPRECATED RDW RBC AUTO: 45.5 FL (ref 37–54)
EGFRCR SERPLBLD CKD-EPI 2021: 50.6 ML/MIN/1.73
EOSINOPHIL # BLD AUTO: 0.23 10*3/MM3 (ref 0–0.4)
EOSINOPHIL NFR BLD AUTO: 3 % (ref 0.3–6.2)
ERYTHROCYTE [DISTWIDTH] IN BLOOD BY AUTOMATED COUNT: 13.4 % (ref 12.3–15.4)
FLUAV RNA RESP QL NAA+PROBE: NOT DETECTED
FLUBV RNA RESP QL NAA+PROBE: NOT DETECTED
GLOBULIN UR ELPH-MCNC: 3.3 GM/DL
GLUCOSE BLDC GLUCOMTR-MCNC: 104 MG/DL (ref 70–130)
GLUCOSE SERPL-MCNC: 108 MG/DL (ref 65–99)
HCT VFR BLD AUTO: 38.2 % (ref 34–46.6)
HGB BLD-MCNC: 12.6 G/DL (ref 12–15.9)
HOLD SPECIMEN: NORMAL
HOLD SPECIMEN: NORMAL
IMM GRANULOCYTES # BLD AUTO: 0.03 10*3/MM3 (ref 0–0.05)
IMM GRANULOCYTES NFR BLD AUTO: 0.4 % (ref 0–0.5)
INR PPP: 1.04 (ref 0.9–1.1)
LYMPHOCYTES # BLD AUTO: 2.36 10*3/MM3 (ref 0.7–3.1)
LYMPHOCYTES NFR BLD AUTO: 30.8 % (ref 19.6–45.3)
MCH RBC QN AUTO: 30.4 PG (ref 26.6–33)
MCHC RBC AUTO-ENTMCNC: 33 G/DL (ref 31.5–35.7)
MCV RBC AUTO: 92 FL (ref 79–97)
MONOCYTES # BLD AUTO: 0.54 10*3/MM3 (ref 0.1–0.9)
MONOCYTES NFR BLD AUTO: 7.1 % (ref 5–12)
NEUTROPHILS NFR BLD AUTO: 4.44 10*3/MM3 (ref 1.7–7)
NEUTROPHILS NFR BLD AUTO: 58 % (ref 42.7–76)
NRBC BLD AUTO-RTO: 0 /100 WBC (ref 0–0.2)
PLATELET # BLD AUTO: 289 10*3/MM3 (ref 140–450)
PMV BLD AUTO: 9.8 FL (ref 6–12)
POTASSIUM SERPL-SCNC: 3.5 MMOL/L (ref 3.5–5.2)
PROT SERPL-MCNC: 7.5 G/DL (ref 6–8.5)
PROTHROMBIN TIME: 13.8 SECONDS (ref 12.1–15)
QT INTERVAL: 423 MS
RBC # BLD AUTO: 4.15 10*6/MM3 (ref 3.77–5.28)
SARS-COV-2 RNA RESP QL NAA+PROBE: DETECTED
SODIUM SERPL-SCNC: 139 MMOL/L (ref 136–145)
WBC NRBC COR # BLD: 7.65 10*3/MM3 (ref 3.4–10.8)
WHOLE BLOOD HOLD COAG: NORMAL
WHOLE BLOOD HOLD SPECIMEN: NORMAL

## 2022-08-05 PROCEDURE — 99284 EMERGENCY DEPT VISIT MOD MDM: CPT

## 2022-08-05 PROCEDURE — 93005 ELECTROCARDIOGRAM TRACING: CPT | Performed by: EMERGENCY MEDICINE

## 2022-08-05 PROCEDURE — 93010 ELECTROCARDIOGRAM REPORT: CPT | Performed by: INTERNAL MEDICINE

## 2022-08-05 PROCEDURE — 70551 MRI BRAIN STEM W/O DYE: CPT

## 2022-08-05 PROCEDURE — 80053 COMPREHEN METABOLIC PANEL: CPT | Performed by: EMERGENCY MEDICINE

## 2022-08-05 PROCEDURE — 71045 X-RAY EXAM CHEST 1 VIEW: CPT

## 2022-08-05 PROCEDURE — 85025 COMPLETE CBC W/AUTO DIFF WBC: CPT | Performed by: EMERGENCY MEDICINE

## 2022-08-05 PROCEDURE — 82962 GLUCOSE BLOOD TEST: CPT

## 2022-08-05 PROCEDURE — 99204 OFFICE O/P NEW MOD 45 MIN: CPT | Performed by: PSYCHIATRY & NEUROLOGY

## 2022-08-05 PROCEDURE — 85610 PROTHROMBIN TIME: CPT | Performed by: EMERGENCY MEDICINE

## 2022-08-05 PROCEDURE — 70450 CT HEAD/BRAIN W/O DYE: CPT

## 2022-08-05 PROCEDURE — 87636 SARSCOV2 & INF A&B AMP PRB: CPT | Performed by: EMERGENCY MEDICINE

## 2022-08-05 RX ORDER — METHYLPREDNISOLONE 4 MG/1
TABLET ORAL
Qty: 21 TABLET | Refills: 0 | Status: SHIPPED | OUTPATIENT
Start: 2022-08-05

## 2022-08-05 RX ORDER — SODIUM CHLORIDE 0.9 % (FLUSH) 0.9 %
10 SYRINGE (ML) INJECTION AS NEEDED
Status: DISCONTINUED | OUTPATIENT
Start: 2022-08-05 | End: 2022-08-05 | Stop reason: HOSPADM

## 2022-08-05 NOTE — CONSULTS
[facility]   Teleneurology Note    Patient Name: Britta Armijo  : 1945  MRN: [id number]  Primary Care Physician: Glenn Pendleton MD  Referring Site: [place]  Location of Neurologist: [place]    Subjective   Teleneurology Initial Data                 Date Last Known Well: 22 Time Last Known Well: 1900     History     Chief Complaint: Right facial droop  HPI: 76-year-old right-handed white female with known diagnosis of hypertension, hyperlipidemia, who comes in with 1 day of right facial droop, no right upper or lower extremity weakness.  Patient describes some trouble swallowing.  No vision changes.  No history of similar symptoms in the past.    Stroke Risk Factors/ Pertinent Data     Stroke risk factors: dyslipidemia, hypertension  Anticoagulants prior to arrival: none  Antiplatelets prior to arrival: aspirin     Pre- Stroke Modified Johanna Scale     Pre-Stroke Modified Gilliam Scale: 0 - No Symptoms at all.    NIH Stroke Scale           Interval: baseline  1a. Level of Consciousness: 0-->Alert, keenly responsive  1b. LOC Questions: 0-->Answers both questions correctly  1c. LOC Commands: 0-->Performs both tasks correctly  2. Best Gaze: 0-->Normal  3. Visual: 0-->No visual loss  4. Facial Palsy: 3-->Complete paralysis of one or both sides (absence of facial movement in the upper and lower face)  5a. Motor Arm, Left: 0-->No drift, limb holds 90 (or 45) degrees for full 10 secs  5b. Motor Arm, Right: 0-->No drift, limb holds 90 (or 45) degrees for full 10 secs  6a. Motor Leg, Left: 0-->No drift, leg holds 30 degree position for full 5 secs  6b. Motor Leg, Right: 0-->No drift, leg holds 30 degree position for full 5 secs  7. Limb Ataxia: 0-->Absent  8. Sensory: 0-->Normal, no sensory loss  9. Best Language: 0-->No aphasia, normal  10. Dysarthria: 0-->Normal  11. Extinction and Inattention (formerly Neglect): 0-->No abnormality  Total (NIH Stroke Scale): 3     Review of Systems      Review of Systems   HENT: Positive for trouble swallowing.    Neurological: Positive for facial asymmetry.   All other systems reviewed and are negative.      Objective   Exam     Exam performed with the help of support staff from the referring site  Neurological Exam  Mental Status  Awake, alert and oriented to person, place and time. Speech is normal. Language is fluent with no aphasia. Attention and concentration are normal. Fund of knowledge is appropriate for level of education.    Cranial Nerves  CN II: Visual fields full to confrontation.  CN III, IV, VI: Extraocular movements intact bilaterally. Normal lids and orbits bilaterally. Pupils equal round and reactive to light bilaterally.  CN V: Facial sensation is normal.  CN VII: Right upper and lower facial asymmetry.  CN VIII: Equal hearing bilaterally.  CN IX, X: Palate elevates symmetrically  CN XI: Shoulder shrug strength is normal.  CN XII: Tongue midline without atrophy or fasciculations.    Motor  Normal muscle bulk throughout. No fasciculations present. Strength is 5/5 throughout all four extremities.    Sensory  Light touch is normal in upper and lower extremities.     Reflexes   not assessed    Coordination  No dysmetria.    Gait  N not assessed      Result Review    Results          Personal review of CNS imaging:(Official report by radiologist pending)  Imaging  CT Imaging Review:  (CT head shows no acute changes, no hemorrhage.  MRI brain shows no acute findings, mild white matter disease.)    Thrombolytic   Thrombolytics: not applicable     Assessment & Plan   Assessment/ Plan     Assessment:  Acute Stroke Evaluation:  (Right-sided Bell's palsy)       Plan:      Final Impression and Plan: Patient likely with right-sided Bell's palsy, rather than vascular cause.  Her CT and MRI is negative.  No need for CT angiogram.  Recommend Medrol Dosepak.  No further neuro work-up at this time.    Disposition     Disposition: The patient will remain at  the referring institution for further evaluation and management    Medical Decision Making  Medical Data Reviewed: Data reviewed including: clinical labs, radiology and/or medical tests, Independent review of CNS images  Length of visit: 35 minutes    I, Chucho Harris MD, saw the patient on   at   for an initial in-patient or emergency room telememedicine face to face consult using interactive technology for 35 minutes. The location of the patient was [place]. I was located at [place]. I was assisted with the exam by  .    I have proceeded with this evaluation at the request of the referring practitioner as it is felt to be an emergency setting and no appropriate specialist is available to perform this evaluation. The originating hospital has reported that this is the correct patient and has obtained consent from the patient/surrogate to perform this telemedicine evaluation(including obtaining history, performing examination and reviewing data provided by the patient an/or originating site of care provider)    I have introduced myself to the patient, provided my credentials, disclosed my location, and determined that, based on review of the patient's chart and discussion with the patient's primary team, telemedicine via a HIPAA compliant, real-time, face-to-face two-way, interactive audio and video platform is an appropriate and effective means of providing the service.    The patient/surrogate has a right to refuse this evaluation as they have been explained risks including potential loss of confidentiality, benefits, alternatives, and the potential need for subsequent face-to-face care. In this evaluation, we will be providing recommendations only.  The ultimate decision to follow or not to follow these recommendations will be left to the bedside treating/requesting practitioner.    The patient/surrogate has been notified that other healthcare professionals including technical person may be involved in this  A/V evaluation.  All laws concerning confidentiality and patient access to medical records and copies of medical records apply to telemedicine.  The patient/surrogate has received the originating site's Health Notice of Privacy Practices.    Chucho Harris MD

## 2022-08-05 NOTE — DISCHARGE INSTRUCTIONS
Follow-up with Dr. Pendleton by [place] next week.  Return to the emergency department if you have shortness of breath worse in any way at all.

## 2022-08-05 NOTE — ED PROVIDER NOTES
Subjective   History of Present Illness  History of Present Illness    Chief complaint: Facial droop    Location: Right face    Quality/Severity: Moderate    Timing/Onset/Duration: Awoke this morning with the symptoms    Modifying Factors: Nothing makes it better    Associated Symptoms: No headache.  No fever chills or cough.  No sore throat earache or nasal congestion.  No chest pain or shortness of breath.  No abdominal pain.  No diarrhea or burning when she urinates.  No nausea or vomiting.  She complains of difficulty swallowing pills.    Narrative: This 76-year-old white female awoke this morning with right-sided facial droop.  She has never had anything like this before.    PCP: Dr. Pendleton          Review of Systems   Constitutional: Negative for chills and fever.   HENT: Negative for ear pain and sore throat.    Respiratory: Negative for cough and shortness of breath.    Cardiovascular: Negative for chest pain.   Gastrointestinal: Negative for abdominal pain, diarrhea, nausea and vomiting.   Genitourinary: Negative for difficulty urinating.   Musculoskeletal: Negative for back pain.   Skin: Negative for rash.   Neurological: Positive for facial asymmetry. Negative for speech difficulty, numbness and headaches.        Difficulty swallowing pills   Psychiatric/Behavioral: Negative for confusion.        Medication List      ASK your doctor about these medications    acetaminophen 650 MG 8 hr tablet  Commonly known as: TYLENOL     albuterol sulfate  (90 Base) MCG/ACT inhaler  Commonly known as: PROVENTIL HFA;VENTOLIN HFA;PROAIR HFA     aspirin 81 MG EC tablet  Take 1 tablet by mouth Daily.     cyclobenzaprine 5 MG tablet  Commonly known as: FLEXERIL  Take 1 tablet by mouth 3 (Three) Times a Day As Needed for Muscle Spasms.     escitalopram 20 MG tablet  Commonly known as: LEXAPRO     famotidine 40 MG tablet  Commonly known as: PEPCID     losartan-hydrochlorothiazide 100-12.5 MG per tablet  Commonly  known as: HYZAAR     meloxicam 15 MG tablet  Commonly known as: MOBIC  Take 1 tablet by mouth Daily.     Nitrostat 0.4 MG SL tablet  Generic drug: nitroglycerin     predniSONE 10 MG tablet  Commonly known as: DELTASONE  60 mg po daily x 3 days, then 40 mg po daily x 3 days, then 20 mg po daily x 3 days, then 10 mg po daily x 3 days     simvastatin 10 MG tablet  Commonly known as: ZOCOR     vitamin D3 125 MCG (5000 UT) capsule capsule            Past Medical History:   Diagnosis Date   • Arthritis of back     NECK & LOWER BACK   • COVID-19 vaccine series completed    • Fracture, humerus    • Frequent urinary tract infections     SCHEDULED FOR BLADDER BIOPSY   • Generalized anxiety disorder with panic attacks    • GERD (gastroesophageal reflux disease)    • Gross hematuria 8/25/2021   • Heart attack (HCC)     YEARS AGO   • Hyperlipidemia    • Hypertension    • Lumbosacral disc disease    • MRSA (methicillin resistant staph aureus) culture positive 2018    POSITIVE NASAL SWAB PRIOR TO HIP SURGERY   • Osteoporosis    • Right shoulder pain     SCHEDULED FOR TOTAL SHOULDER   • Unable to read or write     UNABLE TO READ       Allergies   Allergen Reactions   • Sulfa Antibiotics Nausea And Vomiting and Unknown - Low Severity       Past Surgical History:   Procedure Laterality Date   • APPENDECTOMY     • BREAST BIOPSY Left 2020    benign   • BREAST LUMPECTOMY Left     BENIGN   • BREAST SURGERY  07/03/2020   • CATARACT EXTRACTION, BILATERAL     • CHOLECYSTECTOMY OPEN     • CYSTOSCOPY BLADDER BIOPSY N/A 8/25/2021    Procedure: CYSTOSCOPY BLADDER BIOPSY;  Surgeon: Segundo Sprague MD;  Location: [facility];  Service: Urology;  Laterality: N/A;   • EPIDURAL BLOCK     • HEMORRHOIDECTOMY      X4   • HIP SURGERY Right     Replacement   • HYSTERECTOMY  1991   • SHOULDER MANIPULATION Right 5/14/2021    Procedure: REVERSE TOTAL SHOULDER MANIPULATION;  Surgeon: Derrick Pelayo MD;  Location: [facility];  Service: Orthopedics;   Laterality: Right;   • TOTAL SHOULDER ARTHROPLASTY W/ DISTAL CLAVICLE EXCISION Right 3/22/2021    Procedure: TOTAL SHOULDER REVERSE ARTHROPLASTY;  Surgeon: Derrick Pelayo MD;  Location: [facility];  Service: Orthopedics;  Laterality: Right;  TOTAL SHOULDER REVERSE ARTHROPLASTY   • TUMOR REMOVAL  2017    RIGHT ARM        Family History   Problem Relation Age of Onset   • Cancer Father         spine   • Diabetes Paternal Aunt    • Diabetes Paternal Uncle    • Breast cancer Maternal Aunt        Social History     Socioeconomic History   • Marital status:    Tobacco Use   • Smoking status: Never Smoker   • Smokeless tobacco: Never Used   Vaping Use   • Vaping Use: Never used   Substance and Sexual Activity   • Alcohol use: Never   • Drug use: Never   • Sexual activity: Defer           Objective   Physical Exam  Vitals (The temperature is 98.4 °F, pulse 76, respirations 16, /104, room air pulse ox 98%.) and nursing note reviewed.   Constitutional:       Appearance: Normal appearance.   HENT:      Head: Atraumatic.      Right Ear: Tympanic membrane normal.      Left Ear: Tympanic membrane normal.      Nose: Nose normal.      Mouth/Throat:      Mouth: Mucous membranes are moist.      Pharynx: No oropharyngeal exudate or posterior oropharyngeal erythema.   Eyes:      Extraocular Movements: Extraocular movements intact.      Pupils: Pupils are equal, round, and reactive to light.   Cardiovascular:      Rate and Rhythm: Normal rate and regular rhythm.      Pulses: Normal pulses.      Heart sounds: Normal heart sounds. No murmur heard.    No friction rub. No gallop.   Pulmonary:      Effort: Pulmonary effort is normal.      Breath sounds: Normal breath sounds.   Abdominal:      General: Abdomen is flat. Bowel sounds are normal. There is no distension.      Palpations: Abdomen is soft. There is no mass.      Tenderness: There is no abdominal tenderness. There is no guarding or rebound.      Hernia: No hernia is  present.   Musculoskeletal:         General: No swelling or tenderness. Normal range of motion.      Cervical back: Normal range of motion and neck supple. No rigidity or tenderness.   Skin:     General: Skin is warm and dry.   Neurological:      Sensory: No sensory deficit.      Motor: Weakness (Right facial) present.      Coordination: Coordination normal.      Comments: Right-sided facial droop, can wrinkle both sides of the forehead   Psychiatric:         Mood and Affect: Mood normal.         Procedures           ED Course  ED Course as of 08/05/22 1338   Fri Aug 05, 2022   1332 The COVID flu is positive for COVID 19.    The CBC is normal.  The CMP shows a creatinine of 1.14.  The GFR is 50.  The PT is 13.8 and the INR is 1.04. [RC]      ED Course User Index  [RC] Buck Kinsey MD      11:51 EDT, 08/05/22:  I spoke with Dr. Harris, on-call for stroke neurology, he recommended getting a CT angiogram head and neck and MRI of the brain.  He will consult on the patient.       13:39 EDT, 08/05/22:  Dr. Harris has evaluated the patient.  He finds that the patient likely has Bell's palsy.  The patient is also COVID-positive.  He feels like no other imaging is needed as it other than what has been done thus far.    13:50 EDT, 08/05/22:  The patient's diagnosis of Bell's palsy was discussed with her and COVID-19.  The patient should quarantine for 10 days.  She should follow-up with Dr. Bates  by Zoom next week.  She should return to the emergency department she has shortness of breath, worse in any way at all.  The patient will be placed on a Medrol Dosepak.  All the patient's questions were answered she will be discharged in good condition     13:57 EDT, 08/05/22:  The EKG was obtained at 1306 and read by me at 1307.  EKG shows a normal sinus rhythm with rate of 60.  There is a borderline left axis deviation with no hypertrophy.  The NH is prolonged at 212 ms.  The QRS and QT intervals are unremarkable.  There  is no ectopy.  There is no acute ST elevation or depression.                           MDM    Final diagnoses:   COVID-19   Bell's palsy       ED Disposition  ED Disposition     None          No follow-up provider specified.       Medication List      No changes were made to your prescriptions during this visit.          Buck Kinsey MD  08/05/22 4430       Buck Kinsey MD  08/05/22 4699

## 2023-03-20 ENCOUNTER — OFFICE VISIT (OUTPATIENT)
Dept: ORTHOPEDIC SURGERY | Facility: CLINIC | Age: 78
End: 2023-03-20
Payer: MEDICARE

## 2023-03-20 VITALS — WEIGHT: 157.8 LBS | HEIGHT: 62 IN | BODY MASS INDEX: 29.04 KG/M2

## 2023-03-20 DIAGNOSIS — Z96.611 STATUS POST REVERSE ARTHROPLASTY OF RIGHT SHOULDER: Primary | ICD-10-CM

## 2023-03-20 PROCEDURE — 1160F RVW MEDS BY RX/DR IN RCRD: CPT | Performed by: ORTHOPAEDIC SURGERY

## 2023-03-20 PROCEDURE — 1159F MED LIST DOCD IN RCRD: CPT | Performed by: ORTHOPAEDIC SURGERY

## 2023-03-20 PROCEDURE — 99212 OFFICE O/P EST SF 10 MIN: CPT | Performed by: ORTHOPAEDIC SURGERY

## 2023-03-20 PROCEDURE — 73030 X-RAY EXAM OF SHOULDER: CPT | Performed by: ORTHOPAEDIC SURGERY

## 2023-03-20 RX ORDER — TRAZODONE HYDROCHLORIDE 50 MG/1
TABLET ORAL
COMMUNITY
Start: 2023-03-15

## 2023-03-20 RX ORDER — OMEPRAZOLE 40 MG/1
CAPSULE, DELAYED RELEASE ORAL
COMMUNITY
Start: 2023-03-15

## 2023-03-20 NOTE — PROGRESS NOTES
Subjective:     Patient ID: Britta Armijo is a 77 y.o. female.    Chief Complaint:  F/u s/p closed reduction right reverse shoulder arthroplasty DOS 5/14/2021  S/p right reverse TSA 3/22/21  History of Present Illness  Britta Armijo returns to clinic today for evaluation of right shoulder.     The patient is doing well overall, and notes improvement in her symptoms. She reports mild limitations, in particular in her overhead motion, as well as her external rotation with strength, but functionally she is able to do everything she needs to do. She denies any fevers, chills, or sweats. The patient denies any issues with her incision. She reports persistent pain, except for terminal limits of motion, which she rates as a 2 to 3 out of 10 when it does occur.     Social History     Occupational History   • Not on file   Tobacco Use   • Smoking status: Never     Passive exposure: Never   • Smokeless tobacco: Never   Vaping Use   • Vaping Use: Never used   Substance and Sexual Activity   • Alcohol use: Never   • Drug use: Never   • Sexual activity: Defer      Past Medical History:   Diagnosis Date   • Arthritis of back     NECK & LOWER BACK   • COVID-19 vaccine series completed    • Fracture, humerus    • Frequent urinary tract infections     SCHEDULED FOR BLADDER BIOPSY   • Generalized anxiety disorder with panic attacks    • GERD (gastroesophageal reflux disease)    • Gross hematuria 8/25/2021   • Heart attack (HCC)     YEARS AGO   • Hyperlipidemia    • Hypertension    • Lumbosacral disc disease    • MRSA (methicillin resistant staph aureus) culture positive 2018    POSITIVE NASAL SWAB PRIOR TO HIP SURGERY   • Osteoporosis    • Right shoulder pain     SCHEDULED FOR TOTAL SHOULDER   • Unable to read or write     UNABLE TO READ     Past Surgical History:   Procedure Laterality Date   • APPENDECTOMY     • BREAST BIOPSY Left 2020    benign   • BREAST LUMPECTOMY Left     BENIGN   • BREAST SURGERY  07/03/2020   • CATARACT  "EXTRACTION, BILATERAL     • CHOLECYSTECTOMY OPEN     • CYSTOSCOPY BLADDER BIOPSY N/A 8/25/2021    Procedure: CYSTOSCOPY BLADDER BIOPSY;  Surgeon: Segundo Sprague MD;  Location: [facility] OR;  Service: Urology;  Laterality: N/A;   • EPIDURAL BLOCK     • HEMORRHOIDECTOMY      X4   • HIP SURGERY Right     Replacement   • HYSTERECTOMY  1991   • SHOULDER MANIPULATION Right 5/14/2021    Procedure: REVERSE TOTAL SHOULDER MANIPULATION;  Surgeon: Derrick Pelayo MD;  Location: [facility] OR;  Service: Orthopedics;  Laterality: Right;   • TOTAL SHOULDER ARTHROPLASTY W/ DISTAL CLAVICLE EXCISION Right 3/22/2021    Procedure: TOTAL SHOULDER REVERSE ARTHROPLASTY;  Surgeon: Derrick Pelayo MD;  Location: [facility] OR;  Service: Orthopedics;  Laterality: Right;  TOTAL SHOULDER REVERSE ARTHROPLASTY   • TUMOR REMOVAL  2017    RIGHT ARM        Family History   Problem Relation Age of Onset   • Cancer Father         spine   • Diabetes Paternal Aunt    • Diabetes Paternal Uncle    • Breast cancer Maternal Aunt          Review of Systems        Objective:  Vitals:    03/20/23 1140   Weight: 71.6 kg (157 lb 12.8 oz)   Height: 157.5 cm (62\")         03/20/23  1140   Weight: 71.6 kg (157 lb 12.8 oz)     Body mass index is 28.86 kg/m².  General: No acute distress.  Resp: normal respiratory effort  Skin: no rashes or wounds; normal turgor  Psych: mood and affect appropriate; recent and remote memory intact          Ortho Exam        right shoulder-  Anterior incision well healed.   FF-   Active- 120 degrees   Passive- 130 degrees   Strength- 4/5  ER-      Active- 20 degrees   Passive 35 degrees   Strength- 4-/5  IR        L3    Positive deltoid firing in all three components.    Brisk cap refill to all digits, 2+ radial pulse right wrist.    Positive sensation to light touch palmar, dorsal aspects of small and index fingers and anatomic snuffbox right hand, symmetric to the left.      Imaging:  Right Shoulder X-Ray  Indication: Status " post shoulder arthroplasty  AP, scapular Y, and axillary lateral views    Findings:  Shoulder arthroplasty components appear to be stable in position, well-seated, overall acceptable alignment.  No evidence of osteolysis, loosening, periprosthetic fracture, or reactive bone formation.    Compared to prior postoperative x-rays from office      Assessment:        1. Status post reverse arthroplasty of right shoulder           Plan:          1. I discussed treatment options at length with patient at today's visit.   2.  The patient is overall doing fairly well at this point in time. She is 2 years status post shoulder arthroplasty and has done well functionally. She is able to do everything she wants to do at this point in time despite her limitations with motion.  3. I will plan on seeing her back on an as needed basis if she has any recurrence of issues.      Britta Armijo was in agreement with plan and had all questions answered.     Orders:  Orders Placed This Encounter   Procedures   • XR Shoulder 2+ View Right       Medications:  No orders of the defined types were placed in this encounter.      Followup:  Return if symptoms worsen or fail to improve.    Diagnoses and all orders for this visit:    1. Status post reverse arthroplasty of right shoulder (Primary)  -     XR Shoulder 2+ View Right          Dictated utilizing Dragon dictation       Transcribed from ambient dictation for Derrick Pelayo MD by Crystal Levine.  03/20/23   13:27 EDT    Patient or patient representative verbalized consent to the visit recording.  I have personally performed the services described in this document as transcribed by the above individual, and it is both accurate and complete.

## 2023-03-27 ENCOUNTER — TRANSCRIBE ORDERS (OUTPATIENT)
Dept: MAMMOGRAPHY | Facility: HOSPITAL | Age: 78
End: 2023-03-27
Payer: MEDICARE

## 2023-03-27 DIAGNOSIS — Z12.31 ENCOUNTER FOR SCREENING MAMMOGRAM FOR BREAST CANCER: Primary | ICD-10-CM

## 2023-04-07 ENCOUNTER — HOSPITAL ENCOUNTER (OUTPATIENT)
Dept: MAMMOGRAPHY | Facility: HOSPITAL | Age: 78
Discharge: HOME | End: 2023-04-07
Payer: MEDICARE

## 2023-04-19 ENCOUNTER — HOSPITAL ENCOUNTER (OUTPATIENT)
Dept: MAMMOGRAPHY | Facility: HOSPITAL | Age: 78
Discharge: HOME | End: 2023-04-19
Admitting: FAMILY MEDICINE
Payer: MEDICARE

## 2023-04-19 DIAGNOSIS — Z12.31 ENCOUNTER FOR SCREENING MAMMOGRAM FOR BREAST CANCER: ICD-10-CM

## 2023-04-19 PROCEDURE — 77063 BREAST TOMOSYNTHESIS BI: CPT

## 2023-04-19 PROCEDURE — 77067 SCR MAMMO BI INCL CAD: CPT

## 2023-08-31 ENCOUNTER — OFFICE VISIT (OUTPATIENT)
Dept: ORTHOPEDIC SURGERY | Facility: CLINIC | Age: 78
End: 2023-08-31
Payer: MEDICARE

## 2023-08-31 ENCOUNTER — TELEPHONE (OUTPATIENT)
Dept: ORTHOPEDIC SURGERY | Facility: CLINIC | Age: 78
End: 2023-08-31

## 2023-08-31 VITALS — BODY MASS INDEX: 28.85 KG/M2 | HEIGHT: 62 IN

## 2023-08-31 DIAGNOSIS — T84.028A INSTABILITY OF REVERSE TOTAL ARTHROPLASTY OF RIGHT SHOULDER: ICD-10-CM

## 2023-08-31 DIAGNOSIS — Z96.611 STATUS POST REVERSE ARTHROPLASTY OF RIGHT SHOULDER: Primary | ICD-10-CM

## 2023-08-31 DIAGNOSIS — Z96.611 INSTABILITY OF REVERSE TOTAL ARTHROPLASTY OF RIGHT SHOULDER: ICD-10-CM

## 2023-08-31 RX ORDER — PREDNISONE 10 MG/1
TABLET ORAL
Qty: 39 TABLET | Refills: 0 | Status: SHIPPED | OUTPATIENT
Start: 2023-08-31

## 2023-08-31 RX ORDER — CARVEDILOL 6.25 MG/1
TABLET ORAL
COMMUNITY
Start: 2023-08-14

## 2023-08-31 RX ORDER — CYCLOBENZAPRINE HCL 5 MG
5 TABLET ORAL 3 TIMES DAILY PRN
Qty: 45 TABLET | Refills: 0 | Status: SHIPPED | OUTPATIENT
Start: 2023-08-31

## 2023-08-31 NOTE — PROGRESS NOTES
Subjective:     Patient ID: Britta Armijo is a 78 y.o. female.    Chief Complaint:  F/u s/p closed reduction right reverse shoulder arthroplasty DOS 5/14/2021  S/p right reverse TSA 3/22/21    History of Present Illness  Britta Armijo returns to clinic today for follow-up evaluation on her right shoulder.     Overall, she has been doing fairly well since this started. She has been having some increasing aching type pain over the lateral aspect of her shoulder, primarily down into her mid upper extremity region radiating down to the elbow, but not below. She did not see associated numbness or tingling. She rates it as moderate intensity, aching in nature. No fevers, chills, or sweats. No issues with her incision. It has limited her activities, particularly with overhead and external rotation positioning.     Social History     Occupational History    Not on file   Tobacco Use    Smoking status: Never     Passive exposure: Never    Smokeless tobacco: Never   Vaping Use    Vaping Use: Never used   Substance and Sexual Activity    Alcohol use: Never    Drug use: Never    Sexual activity: Defer      Past Medical History:   Diagnosis Date    Arthritis of back     NECK & LOWER BACK    COVID-19 vaccine series completed     Fracture, humerus     Frequent urinary tract infections     SCHEDULED FOR BLADDER BIOPSY    Generalized anxiety disorder with panic attacks     GERD (gastroesophageal reflux disease)     Gross hematuria 8/25/2021    Heart attack     YEARS AGO    Hyperlipidemia     Hypertension     Lumbosacral disc disease     MRSA (methicillin resistant staph aureus) culture positive 2018    POSITIVE NASAL SWAB PRIOR TO HIP SURGERY    Osteoporosis     Right shoulder pain     SCHEDULED FOR TOTAL SHOULDER    Unable to read or write     UNABLE TO READ     Past Surgical History:   Procedure Laterality Date    APPENDECTOMY      BREAST BIOPSY Left 2020    benign    BREAST LUMPECTOMY Left     BENIGN    BREAST SURGERY   "07/03/2020    CATARACT EXTRACTION, BILATERAL      CHOLECYSTECTOMY OPEN      CYSTOSCOPY BLADDER BIOPSY N/A 8/25/2021    Procedure: CYSTOSCOPY BLADDER BIOPSY;  Surgeon: Segundo Sprague MD;  Location: [facility] OR;  Service: Urology;  Laterality: N/A;    EPIDURAL BLOCK      HEMORRHOIDECTOMY      X4    HIP SURGERY Right     Replacement    HYSTERECTOMY  1991    SHOULDER MANIPULATION Right 5/14/2021    Procedure: REVERSE TOTAL SHOULDER MANIPULATION;  Surgeon: Derrick Pelayo MD;  Location: [facility] OR;  Service: Orthopedics;  Laterality: Right;    TOTAL SHOULDER ARTHROPLASTY W/ DISTAL CLAVICLE EXCISION Right 3/22/2021    Procedure: TOTAL SHOULDER REVERSE ARTHROPLASTY;  Surgeon: Derrick Pelayo MD;  Location: [facility] OR;  Service: Orthopedics;  Laterality: Right;  TOTAL SHOULDER REVERSE ARTHROPLASTY    TUMOR REMOVAL  2017    RIGHT ARM        Family History   Problem Relation Age of Onset    Cancer Father         spine    Diabetes Paternal Aunt     Diabetes Paternal Uncle     Breast cancer Maternal Aunt          Review of Systems        Objective:  Vitals:    08/31/23 0907   Height: 157.5 cm (62.01\")     There were no vitals filed for this visit.  Body mass index is 28.85 kg/m².  General: No acute distress.  Resp: normal respiratory effort  Skin: no rashes or wounds; normal turgor  Psych: mood and affect appropriate; recent and remote memory intact        Ortho Exam     Right shoulder-  Anterior incision well healed.  No signs of erythema or fluctuance.  No subcutaneous fluid collection.  Moderate tenderness on deep palpation over the mid upper extremity region.  FF- Active- 120 degrees  Strength- 4-/5  ER- Active- 25 degrees  Strength- 4-/5  Positive deltoid firing in all 3 components.  Brisk cap refill to all digits, palpable 1+ radial pulse right wrist.    Imaging:  Right Shoulder X-Ray  Indication: Status post shoulder arthroplasty  AP, scapular Y, and axillary lateral views    Findings:  Shoulder arthroplasty " components appear to be stable in position, well-seated, overall acceptable alignment.  No evidence of osteolysis, loosening, periprosthetic fracture, or reactive bone formation.    Compared to prior postoperative x-rays from office    Assessment:        1. Status post reverse arthroplasty of right shoulder    2. 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 I discussed treatment options at length with patient at today's visit. At this time, she appears to be having some tightening and irritation, particularly of her mid upper extremity musculature. I do not see any evidence of paolo loosening or issue with the shoulder arthroplasty components on plain films today.  2. We will proceed with a prednisone taper as well as Flexeril to be taken at night.  3. I have recommended soft tissue massage, heat, and stretching as well as light strengthening exercises as tolerated.  4. She will follow up on an as needed basis. The patient is greater than 2 years out from surgery.    Britta Armijo was in agreement with plan and had all questions answered.     Orders:  Orders Placed This Encounter   Procedures    XR Shoulder 2+ View Right       Medications:  New Medications Ordered This Visit   Medications    predniSONE (DELTASONE) 10 MG tablet     Si mg po daily x 3 days, then 40 mg po daily x 3 days, then 20 mg po daily x 3 days, then 10 mg po daily x 3 days     Dispense:  39 tablet     Refill:  0    cyclobenzaprine (FLEXERIL) 5 MG tablet     Sig: Take 1 tablet by mouth 3 (Three) Times a Day As Needed for Muscle Spasms.     Dispense:  45 tablet     Refill:  0       Followup:  Return if symptoms worsen or fail to improve.    Diagnoses and all orders for this visit:    1. Status post reverse arthroplasty of right shoulder (Primary)  -     XR Shoulder 2+ View Right    2. 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  Dispense: 45 tablet; Refill: 0          Dictated utilizing Dragon dictation   Transcribed from ambient dictation for Derrick Pelayo MD by Denise Gordon.  08/31/23   10:50 EDT    Patient or patient representative verbalized consent to the visit recording.  I have personally performed the services described in this document as transcribed by the above individual, and it is both accurate and complete.

## 2023-08-31 NOTE — TELEPHONE ENCOUNTER
Britta Armijo Key: [id number] - PA Case ID: [id number] - Rx #: [id number] help? Call us at [phone]  Outcome  Approved: today  PA Case: [id number], Status: Approved, Coverage Starts on: 6/1/2023 12:00:00 AM, Coverage Ends on: 8/30/2024 12:00:00 AM.  Drug  Cyclobenzaprine HCl 5MG tablets  Form  [employer] Electronic PA Form (2017 NCPDP)  Original Claim Info  [phone] PRECERT REQ BY MD; USE [employer] ORMD CALL FOR REVIEW [phone] REQUIRES PRIOR AUTHORIZATION

## 2023-11-02 ENCOUNTER — TRANSCRIBE ORDERS (OUTPATIENT)
Dept: BONE DENSITY | Facility: HOSPITAL | Age: 78
End: 2023-11-02
Payer: MEDICARE

## 2023-11-02 DIAGNOSIS — Z78.0 MENOPAUSE: Primary | ICD-10-CM

## 2023-11-03 ENCOUNTER — HOSPITAL ENCOUNTER (OUTPATIENT)
Dept: GENERAL RADIOLOGY | Facility: HOSPITAL | Age: 78
Discharge: HOME | End: 2023-11-03
Admitting: FAMILY MEDICINE
Payer: MEDICARE

## 2023-11-03 ENCOUNTER — TRANSCRIBE ORDERS (OUTPATIENT)
Dept: ADMINISTRATIVE | Facility: HOSPITAL | Age: 78
End: 2023-11-03
Payer: MEDICARE

## 2023-11-03 DIAGNOSIS — M25.551 RIGHT HIP PAIN: ICD-10-CM

## 2023-11-03 DIAGNOSIS — M25.551 RIGHT HIP PAIN: Primary | ICD-10-CM

## 2023-11-03 PROCEDURE — 73502 X-RAY EXAM HIP UNI 2-3 VIEWS: CPT

## 2024-02-06 ENCOUNTER — APPOINTMENT (OUTPATIENT)
Dept: BONE DENSITY | Facility: HOSPITAL | Age: 79
End: 2024-02-06
Payer: MEDICARE

## 2024-02-06 DIAGNOSIS — Z78.0 MENOPAUSE: ICD-10-CM

## 2024-02-06 PROCEDURE — 77080 DXA BONE DENSITY AXIAL: CPT

## 2024-02-07 ENCOUNTER — OFFICE VISIT (OUTPATIENT)
Dept: ORTHOPEDIC SURGERY | Facility: CLINIC | Age: 79
End: 2024-02-07
Payer: MEDICARE

## 2024-02-07 VITALS — BODY MASS INDEX: 28.89 KG/M2 | HEIGHT: 62 IN | WEIGHT: 157.02 LBS

## 2024-02-07 DIAGNOSIS — T84.028A INSTABILITY OF REVERSE TOTAL ARTHROPLASTY OF RIGHT SHOULDER: ICD-10-CM

## 2024-02-07 DIAGNOSIS — Z96.611 INSTABILITY OF REVERSE TOTAL ARTHROPLASTY OF RIGHT SHOULDER: ICD-10-CM

## 2024-02-07 DIAGNOSIS — Z96.611 STATUS POST REVERSE ARTHROPLASTY OF RIGHT SHOULDER: Primary | ICD-10-CM

## 2024-02-07 RX ORDER — CYCLOBENZAPRINE HCL 5 MG
5 TABLET ORAL 3 TIMES DAILY PRN
Qty: 45 TABLET | Refills: 0 | Status: SHIPPED | OUTPATIENT
Start: 2024-02-07

## 2024-02-07 RX ORDER — PREDNISONE 10 MG/1
TABLET ORAL
Qty: 39 TABLET | Refills: 0 | Status: SHIPPED | OUTPATIENT
Start: 2024-02-07

## 2024-02-07 NOTE — PROGRESS NOTES
Subjective:     Patient ID: Britta Armijo is a 78 y.o. female.    Chief Complaint:  History reverse total shoulder arthroplasty 3/22/2021 right shoulder  Closed reduction of reverse total shoulder arthroplasty completed 5/14/2021  History of Present Illness  Britta Armijo presents to clinic today with daughter for evaluation of her right upper extremity.  She did undergo reverse total shoulder arthroplasty 3/22/2021 followed by closed reduction shoulder arthroplasty 5/14/2021.  She does report a fall approximately 3 weeks ago bilateral back at home has continued to experience pain ever since.  Pain present along the anterior anterior lateral aspect of the shoulder with pain that does radiate to the biceps muscle she is experiencing numbness and tingling is radiating into the hand and weakness.  Also reports decreased range of motion with the right upper extremity.  Prior x-ray images completed in clinic available for review denies any recent x-ray imaging.  She did not require a sling.  She is unsure if she has had her shoulder replaced recent fall but is experiencing pain ever since.  Denies any other concerns present.     Social History     Occupational History    Not on file   Tobacco Use    Smoking status: Never     Passive exposure: Never    Smokeless tobacco: Never   Vaping Use    Vaping Use: Never used   Substance and Sexual Activity    Alcohol use: Never    Drug use: Never    Sexual activity: Defer      Past Medical History:   Diagnosis Date    Arthritis of back     NECK & LOWER BACK    COVID-19 vaccine series completed     Fracture, humerus     Frequent urinary tract infections     SCHEDULED FOR BLADDER BIOPSY    Generalized anxiety disorder with panic attacks     GERD (gastroesophageal reflux disease)     Gross hematuria 8/25/2021    Heart attack     YEARS AGO    Hyperlipidemia     Hypertension     Lumbosacral disc disease     MRSA (methicillin resistant staph aureus) culture positive 2018    POSITIVE  "NASAL SWAB PRIOR TO HIP SURGERY    Osteoporosis     Right shoulder pain     SCHEDULED FOR TOTAL SHOULDER    Unable to read or write     UNABLE TO READ     Past Surgical History:   Procedure Laterality Date    APPENDECTOMY      BREAST BIOPSY Left 2020    benign    BREAST LUMPECTOMY Left     BENIGN    BREAST SURGERY  07/03/2020    CATARACT EXTRACTION, BILATERAL      CHOLECYSTECTOMY OPEN      CYSTOSCOPY BLADDER BIOPSY N/A 8/25/2021    Procedure: CYSTOSCOPY BLADDER BIOPSY;  Surgeon: Segundo Sprague MD;  Location: [facility] OR;  Service: Urology;  Laterality: N/A;    EPIDURAL BLOCK      HEMORRHOIDECTOMY      X4    HIP SURGERY Right     Replacement    HYSTERECTOMY  1991    SHOULDER MANIPULATION Right 5/14/2021    Procedure: REVERSE TOTAL SHOULDER MANIPULATION;  Surgeon: Derrick Pelayo MD;  Location: [facility] OR;  Service: Orthopedics;  Laterality: Right;    TOTAL SHOULDER ARTHROPLASTY W/ DISTAL CLAVICLE EXCISION Right 3/22/2021    Procedure: TOTAL SHOULDER REVERSE ARTHROPLASTY;  Surgeon: Derrick Pelayo MD;  Location: [facility] OR;  Service: Orthopedics;  Laterality: Right;  TOTAL SHOULDER REVERSE ARTHROPLASTY    TUMOR REMOVAL  2017    RIGHT ARM        Family History   Problem Relation Age of Onset    Cancer Father         spine    Diabetes Paternal Aunt     Diabetes Paternal Uncle     Breast cancer Maternal Aunt                Objective:  Physical Exam  General: No acute distress.  Eyes: conjunctiva clear; pupils equally round and reactive  ENT: external ears and nose atraumatic; oropharynx clear  CV: no peripheral edema  Resp: normal respiratory effort  Skin: no rashes or wounds; normal turgor  Psych: mood and affect appropriate; recent and remote memory intact    Vitals:    02/07/24 1049   Weight: 71.2 kg (157 lb 0.3 oz)   Height: 157.5 cm (62.01\")         02/07/24  1049   Weight: 71.2 kg (157 lb 0.3 oz)     Body mass index is 28.71 kg/m².      Ortho Exam     Right shoulder examined  Positive deltoid " firing  Positive sensation light touch all distributions right upper extremity including the palmar and dorsum of the hand and all digits  Brisk cap refill all digits right hand  2+ distal radius pulse  Flexeril  All digits right hand  Positive tenderness along the biceps muscle  Active forward flexion 100 degrees  Passive forward flexion with pain 120 degrees  External rotation 30 degrees  Internal rotation to side  Internal/external rotation strength 3+ out of 5    Imaging:  Right Shoulder X-Ray  Indication: Pain  AP Internal and External Rotation views    Findings:  Reverse total shoulder implant in good positioning no evidence of loosening no periprosthetic fracture noted no other acute osseous abnormality noted on imaging  prior studies were available for comparison.    Assessment:        1. Status post reverse arthroplasty of right shoulder    2. 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  Encouraged to continue with gentle motion avoid any heavy lifting.  Daughter is helping her at home complete activities of daily living.  We discussed options including oral steroid and muscle relaxer.  I do believe an oral steroid and muscle relaxer would be helpful we did review the x-ray imaging does not show any dislocation of the shoulder implant with adequate positioning no evidence of loosening.  We also discussed topical pain reliever wishes to hold off at this time we will plan to see her back in clinic as needed.  All questions answered.  Orders:  Orders Placed This Encounter   Procedures    XR Shoulder 2+ View Right     New Medications Ordered This Visit   Medications    predniSONE (DELTASONE) 10 MG tablet     Si mg daily x 3 days, 40 mg daily x 3 days, 20 mg daily x 3 days, 10 mg daily x 3 days     Dispense:  39 tablet     Refill:  0    cyclobenzaprine (FLEXERIL) 5 MG tablet     Sig: Take 1 tablet by mouth 3 (Three) Times a Day As Needed  for Muscle Spasms.     Dispense:  45 tablet     Refill:  0       Dragon dictation utilized

## 2024-03-11 ENCOUNTER — APPOINTMENT (OUTPATIENT)
Dept: GENERAL RADIOLOGY | Facility: HOSPITAL | Age: 79
End: 2024-03-11
Payer: COMMERCIAL

## 2024-03-11 ENCOUNTER — HOSPITAL ENCOUNTER (EMERGENCY)
Facility: HOSPITAL | Age: 79
Discharge: HOME | End: 2024-03-11
Attending: EMERGENCY MEDICINE | Admitting: EMERGENCY MEDICINE
Payer: COMMERCIAL

## 2024-03-11 VITALS
WEIGHT: 152 LBS | SYSTOLIC BLOOD PRESSURE: 150 MMHG | TEMPERATURE: 98.1 F | HEART RATE: 88 BPM | HEIGHT: 62 IN | BODY MASS INDEX: 27.97 KG/M2 | RESPIRATION RATE: 16 BRPM | DIASTOLIC BLOOD PRESSURE: 68 MMHG | OXYGEN SATURATION: 96 %

## 2024-03-11 DIAGNOSIS — S39.012A LUMBAR STRAIN, INITIAL ENCOUNTER: ICD-10-CM

## 2024-03-11 DIAGNOSIS — S46.911A STRAIN OF RIGHT SHOULDER, INITIAL ENCOUNTER: ICD-10-CM

## 2024-03-11 DIAGNOSIS — S16.1XXA ACUTE STRAIN OF NECK MUSCLE, INITIAL ENCOUNTER: ICD-10-CM

## 2024-03-11 DIAGNOSIS — S29.019A THORACIC MYOFASCIAL STRAIN, INITIAL ENCOUNTER: ICD-10-CM
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
## 2024-03-11 DIAGNOSIS — M51.36 DEGENERATIVE DISC DISEASE, LUMBAR: ICD-10-CM

## 2024-03-11 PROCEDURE — 72050 X-RAY EXAM NECK SPINE 4/5VWS: CPT

## 2024-03-11 PROCEDURE — 73030 X-RAY EXAM OF SHOULDER: CPT

## 2024-03-11 PROCEDURE — 99283 EMERGENCY DEPT VISIT LOW MDM: CPT

## 2024-03-11 PROCEDURE — 72072 X-RAY EXAM THORAC SPINE 3VWS: CPT

## 2024-03-11 PROCEDURE — 72110 X-RAY EXAM L-2 SPINE 4/>VWS: CPT

## 2024-03-11 RX ORDER — METHOCARBAMOL 500 MG/1
750 TABLET, FILM COATED ORAL ONCE
Status: COMPLETED | OUTPATIENT
Start: 2024-03-11 | End: 2024-03-11

## 2024-03-11 RX ORDER — NAPROXEN 250 MG/1
500 TABLET ORAL ONCE
Status: COMPLETED | OUTPATIENT
Start: 2024-03-11 | End: 2024-03-11

## 2024-03-11 RX ORDER — CYCLOBENZAPRINE HCL 5 MG
5 TABLET ORAL 3 TIMES DAILY PRN
Qty: 21 TABLET | Refills: 0 | Status: SHIPPED | OUTPATIENT
Start: 2024-03-11 | End: 2024-03-18

## 2024-03-11 RX ORDER — NAPROXEN 500 MG/1
500 TABLET ORAL 2 TIMES DAILY WITH MEALS
Qty: 20 TABLET | Refills: 0 | Status: SHIPPED | OUTPATIENT
Start: 2024-03-11 | End: 2024-03-21

## 2024-03-11 RX ADMIN — NAPROXEN 500 MG: 250 TABLET ORAL at 12:17

## 2024-03-11 RX ADMIN — METHOCARBAMOL 750 MG: 500 TABLET ORAL at 12:18

## 2024-03-11 NOTE — DISCHARGE INSTRUCTIONS
Rest and increase fluids.  Follow-up with your primary care.  Return to the emergency department if symptoms get worse or change.
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
## 2024-05-30 ENCOUNTER — TRANSCRIBE ORDERS (OUTPATIENT)
Dept: MAMMOGRAPHY | Facility: HOSPITAL | Age: 79
End: 2024-05-30
Payer: MEDICARE

## 2024-05-30 DIAGNOSIS — Z12.31 ENCOUNTER FOR SCREENING MAMMOGRAM FOR BREAST CANCER: Primary | ICD-10-CM

## 2024-07-01 ENCOUNTER — HOSPITAL ENCOUNTER (OUTPATIENT)
Dept: MAMMOGRAPHY | Facility: HOSPITAL | Age: 79
Discharge: HOME | End: 2024-07-01
Admitting: FAMILY MEDICINE
Payer: MEDICARE

## 2024-07-01 DIAGNOSIS — Z12.31 ENCOUNTER FOR SCREENING MAMMOGRAM FOR BREAST CANCER: ICD-10-CM

## 2024-07-01 PROCEDURE — 77063 BREAST TOMOSYNTHESIS BI: CPT

## 2024-07-01 PROCEDURE — 77067 SCR MAMMO BI INCL CAD: CPT | Performed by: RADIOLOGY

## 2024-07-01 PROCEDURE — 77067 SCR MAMMO BI INCL CAD: CPT

## 2024-07-01 PROCEDURE — 77063 BREAST TOMOSYNTHESIS BI: CPT | Performed by: RADIOLOGY

## 2024-07-11 ENCOUNTER — OFFICE VISIT (OUTPATIENT)
Dept: ORTHOPEDIC SURGERY | Facility: CLINIC | Age: 79
End: 2024-07-11
Payer: MEDICARE

## 2024-07-11 VITALS — HEIGHT: 62 IN | BODY MASS INDEX: 27.97 KG/M2 | WEIGHT: 152 LBS

## 2024-07-11 DIAGNOSIS — M25.521 RIGHT ELBOW PAIN: ICD-10-CM

## 2024-07-11 DIAGNOSIS — Z96.611 STATUS POST REVERSE ARTHROPLASTY OF RIGHT SHOULDER: ICD-10-CM

## 2024-07-11 DIAGNOSIS — M54.12 CERVICAL RADICULOPATHY: Primary | ICD-10-CM

## 2024-07-11 PROCEDURE — 99214 OFFICE O/P EST MOD 30 MIN: CPT | Performed by: NURSE PRACTITIONER

## 2024-07-11 RX ORDER — CYCLOBENZAPRINE HCL 5 MG
5 TABLET ORAL NIGHTLY PRN
Qty: 30 TABLET | Refills: 0 | Status: SHIPPED | OUTPATIENT
Start: 2024-07-11

## 2024-07-11 NOTE — PROGRESS NOTES
Subjective:     Patient ID: Britta Armijo is a 78 y.o. female.    Chief Complaint:  Right upper extremity pain - new issue to examiner  History reverse total shoulder arthroplasty - right  Acute fall  History of Present Illness  History of Present Illness  The patient presents to clinic today for evaluation of her right upper extremity.    Two days ago, she fell on a grassy surface with her elbow tucked to the side at 90 degrees, striking the elbow, and subsequently experienced significant swelling at the distal humerus of the right upper extremity. She had a prior reverse total shoulder arthroplasty in 2021 and has been experiencing pain since the surgery. Additionally, she is experiencing pain across her neck and in her left shoulder, along with acute swelling at the distal humerus at the right upper extremity. She occasionally experiences paresthesia down her bilateral upper extremities. She denies any recent bruising or other concerns. She has had a prior MRI of the cervical spine, but is unsure of the timeframe.      Social History     Occupational History    Not on file   Tobacco Use    Smoking status: Never     Passive exposure: Never    Smokeless tobacco: Never   Vaping Use    Vaping status: Never Used   Substance and Sexual Activity    Alcohol use: Never    Drug use: Never    Sexual activity: Defer      Past Medical History:   Diagnosis Date    Arthritis of back     NECK & LOWER BACK    COVID-19 vaccine series completed     Fracture, humerus     Frequent urinary tract infections     SCHEDULED FOR BLADDER BIOPSY    Generalized anxiety disorder with panic attacks     GERD (gastroesophageal reflux disease)     Gross hematuria 8/25/2021    Heart attack     YEARS AGO    Hyperlipidemia     Hypertension     Lumbosacral disc disease     MRSA (methicillin resistant staph aureus) culture positive 2018    POSITIVE NASAL SWAB PRIOR TO HIP SURGERY    Osteoporosis     Right shoulder pain     SCHEDULED FOR TOTAL  "SHOULDER    Unable to read or write     UNABLE TO READ     Past Surgical History:   Procedure Laterality Date    APPENDECTOMY      BREAST BIOPSY Left 2020    benign    BREAST LUMPECTOMY Left     BENIGN    BREAST SURGERY  07/03/2020    CATARACT EXTRACTION, BILATERAL      CHOLECYSTECTOMY OPEN      CYSTOSCOPY BLADDER BIOPSY N/A 8/25/2021    Procedure: CYSTOSCOPY BLADDER BIOPSY;  Surgeon: Segundo Sprague MD;  Location: [facility];  Service: Urology;  Laterality: N/A;    EPIDURAL BLOCK      HEMORRHOIDECTOMY      X4    HIP SURGERY Right     Replacement    HYSTERECTOMY  1991    SHOULDER MANIPULATION Right 5/14/2021    Procedure: REVERSE TOTAL SHOULDER MANIPULATION;  Surgeon: Derrick Pelayo MD;  Location: [facility] OR;  Service: Orthopedics;  Laterality: Right;    TOTAL SHOULDER ARTHROPLASTY W/ DISTAL CLAVICLE EXCISION Right 3/22/2021    Procedure: TOTAL SHOULDER REVERSE ARTHROPLASTY;  Surgeon: Derrick Pelayo MD;  Location: [facility];  Service: Orthopedics;  Laterality: Right;  TOTAL SHOULDER REVERSE ARTHROPLASTY    TUMOR REMOVAL  2017    RIGHT ARM        Family History   Problem Relation Age of Onset    Cancer Father         spine    Diabetes Paternal Aunt     Diabetes Paternal Uncle     Breast cancer Maternal Aunt                Objective:  Physical Exam  General: No acute distress.  Eyes: conjunctiva clear; pupils equally round and reactive  ENT: external ears and nose atraumatic; oropharynx clear  CV: no peripheral edema  Resp: normal respiratory effort  Skin: no rashes or wounds; normal turgor  Psych: mood and affect appropriate; recent and remote memory intact    Vitals:    07/11/24 0830   Weight: 68.9 kg (152 lb)   Height: 157.5 cm (62\")         07/11/24  0830   Weight: 68.9 kg (152 lb)     Body mass index is 27.8 kg/m².      Back Exam     Tenderness   The patient is experiencing tenderness in the cervical.    Comments:  Positive spurling's exam              Physical Exam  Right upper extremity examined. " Active forward flexion 120 degrees, external rotation 40 degrees. Positive swelling distal humerus. Elbow extension 0 degrees, flexion to 90 degrees. Positive sensation to light touch in all distributions right upper extremity. Exhibiting significant pain with extension and flexion of the neck. Positive Spurling's exam at the right upper extremity. No evidence of any issue with the incision from prior reverse total shoulder arthroplasty.    Imaging:  Right Shoulder X-Ray  Indication: Pain  AP Internal and External Rotation views    Findings:  Implant from the first total shoulder arthroplasty remains intact no evidence of periprosthetic fracture no evidence of loosening or other acute osseous abnormality imaging available for comparison in office and immediate postop  prior studies were available for comparison.    Right Elbow X-Ray  Indication: Pain  Views: AP and Lateral views    Findings:  No fracture  No bony lesion  Normal soft tissues  Degenerative changes throughout elbow     No prior studies were available for comparison.  XR Elbow 2 View Right    Independently reviewed four view xray imaging cervical spine two views completed 3/11/2024 available for review in chart: Moderate multilevel cervical spondylosis       Assessment:        1. Cervical radiculopathy    2. Status post reverse arthroplasty of right shoulder    3. Right elbow pain         Assessment & Plan  1. Right upper extremity injury.  I discussed plan of care with patient. We did discuss treatment options including proceeding with oral steroid taper, which she does report has not been helpful in the past. I will proceed with Flexeril to see if this offers her any significant symptom resolution. I also discussed proceeding with MRI of the cervical spine to evaluate nerve compression. I plan to see her back in clinic after completion of testing, discuss results and further plan of care. I encouraged to call with any questions or concerns. All  questions answered.      Orders:  Orders Placed This Encounter   Procedures    XR Shoulder 2+ View Right    XR Elbow 2 View Right    MRI Cervical Spine Without Contrast     New Medications Ordered This Visit   Medications    cyclobenzaprine (FLEXERIL) 5 MG tablet     Sig: Take 1 tablet by mouth At Night As Needed for Muscle Spasms.     Dispense:  30 tablet     Refill:  0           I ordered and reviewed the KAREN today.       Dragon dictation utilized  BMI is >= 25 and <30. (Overweight) The following options were offered after discussion;: weight loss educational material (shared in after visit summary)       Patient or patient representative verbalized consent for the use of Ambient Listening during the visit with  МАРИНА Nevarez for chart documentation. 7/12/2024  19:42 EDT

## 2024-07-20 ENCOUNTER — APPOINTMENT (OUTPATIENT)
Dept: GENERAL RADIOLOGY | Facility: HOSPITAL | Age: 79
End: 2024-07-20
Payer: MEDICARE

## 2024-07-20 ENCOUNTER — HOSPITAL ENCOUNTER (EMERGENCY)
Facility: HOSPITAL | Age: 79
Discharge: HOME | End: 2024-07-20
Attending: EMERGENCY MEDICINE
Payer: MEDICARE

## 2024-07-20 VITALS
HEIGHT: 62 IN | DIASTOLIC BLOOD PRESSURE: 69 MMHG | HEART RATE: 87 BPM | SYSTOLIC BLOOD PRESSURE: 132 MMHG | BODY MASS INDEX: 29.44 KG/M2 | TEMPERATURE: 99 F | RESPIRATION RATE: 18 BRPM | WEIGHT: 160 LBS | OXYGEN SATURATION: 97 %

## 2024-07-20 DIAGNOSIS — S82.401A CLOSED FRACTURE OF SHAFT OF RIGHT FIBULA, UNSPECIFIED FRACTURE MORPHOLOGY, INITIAL ENCOUNTER: Primary | ICD-10-CM

## 2024-07-20 PROCEDURE — 99283 EMERGENCY DEPT VISIT LOW MDM: CPT

## 2024-07-20 PROCEDURE — 73590 X-RAY EXAM OF LOWER LEG: CPT

## 2024-07-20 PROCEDURE — 73630 X-RAY EXAM OF FOOT: CPT

## 2024-07-20 PROCEDURE — 73610 X-RAY EXAM OF ANKLE: CPT

## 2024-07-20 RX ORDER — HYDROCODONE BITARTRATE AND ACETAMINOPHEN 5; 325 MG/1; MG/1
1 TABLET ORAL EVERY 6 HOURS PRN
Qty: 30 TABLET | Refills: 0 | Status: SHIPPED | OUTPATIENT
Start: 2024-07-20 | End: 2024-07-23 | Stop reason: SDUPTHER

## 2024-07-20 RX ORDER — HYDROCODONE BITARTRATE AND ACETAMINOPHEN 5; 325 MG/1; MG/1
1 TABLET ORAL ONCE
Status: COMPLETED | OUTPATIENT
Start: 2024-07-20 | End: 2024-07-20

## 2024-07-20 RX ADMIN — HYDROCODONE BITARTRATE AND ACETAMINOPHEN 1 TABLET: 5; 325 TABLET ORAL at 14:14

## 2024-07-20 NOTE — ED PROVIDER NOTES
Subjective   History of Present Illness    Chief complaint: Ankle pain    Location: Right    Quality/Severity: Swelling and bruising with pain    Timing/Onset/Duration: Yesterday    Modifying Factors: Hurts to bear weight    Associated Symptoms: No numbness, tingling, or weakness.  No other complaints.    Narrative: This 78-year-old presents with right foot and ankle pain that started yesterday afternoon after following down 6 condo steps with a suitcase and rolling on her right ankle.  Patient presents with swelling and bruising.  Patient is not on blood thinners.  She had no loss of consciousness.    PCP:Glenn Pendleton MD      Review of Systems   Musculoskeletal:         Right ankle pain   Skin:         Bruising and swelling right ankle and foot   Neurological:  Negative for weakness and numbness.         Past Medical History:   Diagnosis Date    Arthritis of back     NECK & LOWER BACK    COVID-19 vaccine series completed     Fracture, humerus     Frequent urinary tract infections     SCHEDULED FOR BLADDER BIOPSY    Generalized anxiety disorder with panic attacks     GERD (gastroesophageal reflux disease)     Gross hematuria 8/25/2021    Heart attack     YEARS AGO    Hyperlipidemia     Hypertension     Lumbosacral disc disease     MRSA (methicillin resistant staph aureus) culture positive 2018    POSITIVE NASAL SWAB PRIOR TO HIP SURGERY    Osteoporosis     Right shoulder pain     SCHEDULED FOR TOTAL SHOULDER    Unable to read or write     UNABLE TO READ       Allergies   Allergen Reactions    Sulfa Antibiotics Nausea And Vomiting and Unknown - Low Severity       Past Surgical History:   Procedure Laterality Date    APPENDECTOMY      BREAST BIOPSY Left 2020    benign    BREAST LUMPECTOMY Left     BENIGN    BREAST SURGERY  07/03/2020    CATARACT EXTRACTION, BILATERAL      CHOLECYSTECTOMY OPEN      CYSTOSCOPY BLADDER BIOPSY N/A 8/25/2021    Procedure: CYSTOSCOPY BLADDER BIOPSY;  Surgeon: Celestine  Segundo NICOLE MD;  Location: [facility] OR;  Service: Urology;  Laterality: N/A;    EPIDURAL BLOCK      HEMORRHOIDECTOMY      X4    HIP SURGERY Right     Replacement    HYSTERECTOMY  1991    SHOULDER MANIPULATION Right 5/14/2021    Procedure: REVERSE TOTAL SHOULDER MANIPULATION;  Surgeon: Derrick Pelayo MD;  Location: [facility] OR;  Service: Orthopedics;  Laterality: Right;    TOTAL SHOULDER ARTHROPLASTY W/ DISTAL CLAVICLE EXCISION Right 3/22/2021    Procedure: TOTAL SHOULDER REVERSE ARTHROPLASTY;  Surgeon: Derrick Pelayo MD;  Location: [facility] OR;  Service: Orthopedics;  Laterality: Right;  TOTAL SHOULDER REVERSE ARTHROPLASTY    TUMOR REMOVAL  2017    RIGHT ARM        Family History   Problem Relation Age of Onset    Cancer Father         spine    Diabetes Paternal Aunt     Diabetes Paternal Uncle     Breast cancer Maternal Aunt        Social History     Socioeconomic History    Marital status:    Tobacco Use    Smoking status: Never     Passive exposure: Never    Smokeless tobacco: Never   Vaping Use    Vaping status: Never Used   Substance and Sexual Activity    Alcohol use: Never    Drug use: Never    Sexual activity: Defer           Objective   Physical Exam  Vitals (The temperature is 99 °F, pulse 77, respirations 18, saturation is 97%, /69.) reviewed.   Constitutional:       Appearance: Normal appearance.   Musculoskeletal:      Comments: There is tenderness and swelling upon palpation of the right lateral malleolus and proximal lateral foot.  The capillary refill is less than 2 seconds.  The sensation is intact.  There is a normal range of motion noted.  There is no joint laxity noted.  There right lower extremity is otherwise without tenderness or deformity neurovascular intact.   Skin:     Findings: Bruising (Right foot and ankle) present.   Neurological:      General: No focal deficit present.      Mental Status: She is alert and oriented to person, place, and time.      Sensory: No  sensory deficit.      Motor: No weakness.         Procedures           ED Course      13:51 EDT, 07/20/24:  A posterior Ortho-Glass splint was applied to the right ankle.  It was applied by me.  After application, it was assessed by me and noted to be in good position with right lower extremity being neurovascular intact.  The patient was fitted for crutches and given crutch instructions by the technician.    13:57 EDT, 07/20/24:  The patient should ice the right ankle and elevate it for 20 minutes every 2- 3 hours while awake for 2 to 3 days.  The patient should not bear weight on the right ankle.  Patient should take the Norco as needed as prescribed for pain.  The patient should take Colace as directed as needed for constipation if she is taking Norco for pain.  Patient should call Chantell Hood on Monday for follow-up appointment next week.  The patient should return to the emergency department if there is increasing pain, numbness, tingling, weakness, change in color or temperature of the right lower extremity, worse in any way at all.    13:58 EDT, 07/20/24:  Dr. Lopez, on-call for orthopedics has been paged out.  He agrees with the plan.                                       Medical Decision Making      Final diagnoses:   None       ED Disposition  ED Disposition       None            No follow-up provider specified.       Medication List      No changes were made to your prescriptions during this visit.       No orders to display     Labs Reviewed - No data to display  XR Elbow 2 View Right    Result Date: 7/11/2024  Impression: Ordering physician's impression is located in the Encounter Note dated 07/11/24.      XR Shoulder 2+ View Right    Result Date: 7/11/2024  Impression: Ordering physician's impression is located in the Encounter Note dated 07/11/24.      Mammo Screening Digital Tomosynthesis Bilateral With CAD    Result Date: 7/2/2024  Narrative: CURRENT COMPLAINT-SYMPTOMS: The patient is seen for  yearly screening mammogram of the breast.  FILMS COMPARED: Today's mammogram is compared to prior mammograms dated 11/1/2021 and 4/19/2023.  BILATERAL DIGITAL SCREENING MAMMOGRAM WITH TOMOSYNTHESIS AND R2 CAD  The following views were obtained: Bilateral craniocaudal; bilateral mediolateral oblique. This examination was reviewed with the aid of R2 computer aided detection.  Breast Density: The breasts are heterogeneously dense, which may obscure small masses.  There is been no significant interval change. Compared to the previous mammograms.  No suspicious masses, significant calcification or other abnormalities are seen.      Impression: Impression: There is no mammographic evidence of malignancy.  Screening mammogram in 1 year is recommended.  A notification was sent to the patient.  BI-RADS Category 2: Benign.   This report was finalized on 7/2/2024 9:08 AM by Neal Erwin MD.       Final diagnoses:   None         ED Medications:  Medications - No data to display    New Medications:     Medication List        ASK your doctor about these medications      albuterol sulfate  (90 Base) MCG/ACT inhaler  Commonly known as: PROVENTIL HFA;VENTOLIN HFA;PROAIR HFA     aspirin 81 MG EC tablet  Take 1 tablet by mouth Daily.     carvedilol 6.25 MG tablet  Commonly known as: COREG     cyclobenzaprine 5 MG tablet  Commonly known as: FLEXERIL  Take 1 tablet by mouth At Night As Needed for Muscle Spasms.     losartan-hydrochlorothiazide 100-12.5 MG per tablet  Commonly known as: HYZAAR     nitroglycerin 0.4 MG SL tablet  Commonly known as: NITROSTAT     omeprazole 40 MG capsule  Commonly known as: priLOSEC     Potassium 95 MG tablet     predniSONE 10 MG tablet  Commonly known as: DELTASONE  60 mg daily x 3 days, 40 mg daily x 3 days, 20 mg daily x 3 days, 10 mg daily x 3 days     simvastatin 10 MG tablet  Commonly known as: ZOCOR     traZODone 50 MG tablet  Commonly known as: DESYREL     vitamin D3 125 MCG (5000 UT)  capsule capsule              Stopped Medications:     Medication List        ASK your doctor about these medications      albuterol sulfate  (90 Base) MCG/ACT inhaler  Commonly known as: PROVENTIL HFA;VENTOLIN HFA;PROAIR HFA     aspirin 81 MG EC tablet  Take 1 tablet by mouth Daily.     carvedilol 6.25 MG tablet  Commonly known as: COREG     cyclobenzaprine 5 MG tablet  Commonly known as: FLEXERIL  Take 1 tablet by mouth At Night As Needed for Muscle Spasms.     losartan-hydrochlorothiazide 100-12.5 MG per tablet  Commonly known as: HYZAAR     nitroglycerin 0.4 MG SL tablet  Commonly known as: NITROSTAT     omeprazole 40 MG capsule  Commonly known as: priLOSEC     Potassium 95 MG tablet     predniSONE 10 MG tablet  Commonly known as: DELTASONE  60 mg daily x 3 days, 40 mg daily x 3 days, 20 mg daily x 3 days, 10 mg daily x 3 days     simvastatin 10 MG tablet  Commonly known as: ZOCOR     traZODone 50 MG tablet  Commonly known as: DESYREL     vitamin D3 125 MCG (5000 UT) capsule capsule                   Buck Kinsey MD  07/20/24 9320

## 2024-07-20 NOTE — DISCHARGE INSTRUCTIONS
Ice to the right ankle for 20 minutes every 2-3 hours while awake for 2 to 3 days.  Nonweightbearing right ankle.  Elevate the right ankle.  Take the Norco as needed as prescribed for pain.  Take Colace as directed as needed for constipation if you are taking the [place] for pain.  Call Dr. Chantell Hood on Monday for follow-up appointment next week.  Return to the emergency department if there is increasing pain, numbness, tingling, weakness, change in color or temperature of the right lower extremity, worse in any way at all.

## 2024-07-23 ENCOUNTER — OFFICE VISIT (OUTPATIENT)
Dept: ORTHOPEDIC SURGERY | Facility: CLINIC | Age: 79
End: 2024-07-23
Payer: MEDICARE

## 2024-07-23 ENCOUNTER — HOME HEALTH ADMISSION (OUTPATIENT)
Dept: HOME HEALTH SERVICES | Facility: HOME HEALTHCARE | Age: 79
End: 2024-07-23
Payer: COMMERCIAL

## 2024-07-23 VITALS
SYSTOLIC BLOOD PRESSURE: 123 MMHG | BODY MASS INDEX: 29.44 KG/M2 | WEIGHT: 160 LBS | HEART RATE: 74 BPM | HEIGHT: 62 IN | DIASTOLIC BLOOD PRESSURE: 69 MMHG

## 2024-07-23 DIAGNOSIS — Z96.611 STATUS POST REVERSE ARTHROPLASTY OF RIGHT SHOULDER: ICD-10-CM

## 2024-07-23 DIAGNOSIS — S82.401A CLOSED FRACTURE OF SHAFT OF RIGHT FIBULA, UNSPECIFIED FRACTURE MORPHOLOGY, INITIAL ENCOUNTER: ICD-10-CM

## 2024-07-23 DIAGNOSIS — S82.831A OTHER CLOSED FRACTURE OF DISTAL END OF RIGHT FIBULA, INITIAL ENCOUNTER: Primary | ICD-10-CM

## 2024-07-23 PROCEDURE — 99214 OFFICE O/P EST MOD 30 MIN: CPT | Performed by: NURSE PRACTITIONER

## 2024-07-23 PROCEDURE — 27808 TREATMENT OF ANKLE FRACTURE: CPT | Performed by: NURSE PRACTITIONER

## 2024-07-23 PROCEDURE — 1160F RVW MEDS BY RX/DR IN RCRD: CPT | Performed by: NURSE PRACTITIONER

## 2024-07-23 PROCEDURE — 1159F MED LIST DOCD IN RCRD: CPT | Performed by: NURSE PRACTITIONER

## 2024-07-23 RX ORDER — FAMOTIDINE 40 MG/1
TABLET, FILM COATED ORAL
COMMUNITY
Start: 2024-07-18

## 2024-07-23 RX ORDER — ESCITALOPRAM OXALATE 10 MG/1
TABLET ORAL
COMMUNITY
Start: 2024-06-03

## 2024-07-23 RX ORDER — HYDROCODONE BITARTRATE AND ACETAMINOPHEN 5; 325 MG/1; MG/1
1 TABLET ORAL EVERY 6 HOURS PRN
Qty: 42 TABLET | Refills: 0 | Status: SHIPPED | OUTPATIENT
Start: 2024-07-23

## 2024-07-23 RX ORDER — FLUTICASONE FUROATE AND VILANTEROL TRIFENATATE 200; 25 UG/1; UG/1
POWDER RESPIRATORY (INHALATION)
COMMUNITY
Start: 2024-07-15

## 2024-07-23 NOTE — PROGRESS NOTES
Subjective:     Patient ID: Britta Armijo is a 78 y.o. female.    Chief Complaint:  Right ankle injury, new issue to examiner  History of Present Illness  History of Present Illness  The patient presents to clinic today for evaluation of her right ankle.    She experienced a fall down several stairs, rolling her ankle, on 07/18/2024 at her home while carrying a suitcase. She sought medical attention at [facility] where she was placed in a posterior splint and given crutches. However, these crutches exacerbated her shoulder pain. She has a history of a reverse total shoulder arthroplasty on her right shoulder and continues to experience pain, swelling, and a throbbing sensation in her ankle due to the use of crutches. She has been taking Norco as needed, ibuprofen, and icing her ankle. She lives with her daughter for greater support and does not have a rollator or walker. She also denies using any other DME equipment, including a wheelchair. She is taking aspirin once daily for DVT prophylaxis and has no other concerns.       Social History     Occupational History    Not on file   Tobacco Use    Smoking status: Never     Passive exposure: Never    Smokeless tobacco: Never   Vaping Use    Vaping status: Never Used   Substance and Sexual Activity    Alcohol use: Never    Drug use: Never    Sexual activity: Defer      Past Medical History:   Diagnosis Date    Arthritis of back     NECK & LOWER BACK    COVID-19 vaccine series completed     Fracture, humerus     Frequent urinary tract infections     SCHEDULED FOR BLADDER BIOPSY    Generalized anxiety disorder with panic attacks     GERD (gastroesophageal reflux disease)     Gross hematuria 8/25/2021    Heart attack     YEARS AGO    Hyperlipidemia     Hypertension     Lumbosacral disc disease     MRSA (methicillin resistant staph aureus) culture positive 2018    POSITIVE NASAL SWAB PRIOR TO HIP SURGERY    Osteoporosis     Right shoulder pain      "SCHEDULED FOR TOTAL SHOULDER    Unable to read or write     UNABLE TO READ     Past Surgical History:   Procedure Laterality Date    APPENDECTOMY      BREAST BIOPSY Left 2020    benign    BREAST LUMPECTOMY Left     BENIGN    BREAST SURGERY  07/03/2020    CATARACT EXTRACTION, BILATERAL      CHOLECYSTECTOMY OPEN      CYSTOSCOPY BLADDER BIOPSY N/A 8/25/2021    Procedure: CYSTOSCOPY BLADDER BIOPSY;  Surgeon: Segundo Sprague MD;  Location: [facility];  Service: Urology;  Laterality: N/A;    EPIDURAL BLOCK      HEMORRHOIDECTOMY      X4    HIP SURGERY Right     Replacement    HYSTERECTOMY  1991    SHOULDER MANIPULATION Right 5/14/2021    Procedure: REVERSE TOTAL SHOULDER MANIPULATION;  Surgeon: Derrick Pelayo MD;  Location: [facility] OR;  Service: Orthopedics;  Laterality: Right;    TOTAL SHOULDER ARTHROPLASTY W/ DISTAL CLAVICLE EXCISION Right 3/22/2021    Procedure: TOTAL SHOULDER REVERSE ARTHROPLASTY;  Surgeon: Derrick Pelayo MD;  Location: [facility] OR;  Service: Orthopedics;  Laterality: Right;  TOTAL SHOULDER REVERSE ARTHROPLASTY    TUMOR REMOVAL  2017    RIGHT ARM        Family History   Problem Relation Age of Onset    Cancer Father         spine    Diabetes Paternal Aunt     Diabetes Paternal Uncle     Breast cancer Maternal Aunt                Objective:  Physical Exam    Vital signs reviewed.   General: No acute distress.  Eyes: conjunctiva clear; pupils equally round and reactive  ENT: external ears and nose atraumatic; oropharynx clear  CV: no peripheral edema  Resp: normal respiratory effort  Skin: no rashes or wounds; normal turgor  Psych: mood and affect appropriate; recent and remote memory intact    Vitals:    07/23/24 0813   BP: 123/69   Pulse: 74   Weight: 72.6 kg (160 lb)   Height: 157.5 cm (62\")         07/23/24  0813   Weight: 72.6 kg (160 lb)     Body mass index is 29.26 kg/m².      Ortho Exam     Physical Exam  Maximal tenderness is present along the lateral aspect of the right ankle with " significant associated swelling and scattered ecchymosis 2+ distal radius pulse  Flex/extend all digits  Moderate swelling along the medial and lateral aspect of the ankle  Negative calf tenderness, negative Homans' sign  Negative palpable defect along the Achilles tendon    Imaging:    XR Ankle 3+ View Right    Result Date: 7/20/2024  Impression: 1.Obliquely oriented fracture distal fibula with soft tissue swelling about the lateral malleolar area. 2.Soft tissue swelling along the dorsum of the forefoot. 3.Degenerative changes noted involving the first metatarsal phalangeal joint space. 4.Plantar calcaneal spur as well as ossification along the posterior calcaneal area. Electronically Signed: Binh Dumont MD  7/20/2024 1:06 PM EDT  Workstation ID: [id number]    Independently reviewed three-view x-ray imaging right ankle obliquely oriented fracture distal fibula  Assessment:        1. Other closed fracture of distal end of right fibula, initial encounter    2. Status post reverse arthroplasty of right shoulder    3. Closed fracture of shaft of right fibula, unspecified fracture morphology, initial encounter         Assessment & Plan  1. Right ankle pain.  I discussed plan of care with patient and family. I will proceed with referral to home health PT for gait training, home evaluation for DME assessment, activities of daily living including occupational therapy. I will see her back in clinic in 2 weeks with repeat x-ray images out of splint, right ankle. Strict nonweightbearing right lower extremity. Discontinue use of crutches. I will provide a walker. I will continue the Norco 5/325, 1 tablet every 4 to 6 hours as needed for moderate to severe pain. I do recommend ice, rest, elevation above the level of her heart and ibuprofen. All questions were answered today's visit.      Orders:  Orders Placed This Encounter   Procedures    Ambulatory Referral to Home Health (Outpatient)     New Medications Ordered This Visit    Medications    HYDROcodone-acetaminophen (NORCO) 5-325 MG per tablet     Sig: Take 1 tablet by mouth Every 6 (Six) Hours As Needed for Moderate Pain.     Dispense:  42 tablet     Refill:  0           I ordered and reviewed the KAREN today.       Dragon dictation utilized          Patient or patient representative verbalized consent for the use of Ambient Listening during the visit with  МАРИНА Nevarez for chart documentation. 7/23/2024  17:24 EDT

## 2024-07-24 ENCOUNTER — HOME CARE VISIT (OUTPATIENT)
Dept: HOME HEALTH SERVICES | Facility: HOME HEALTHCARE | Age: 79
End: 2024-07-24
Payer: COMMERCIAL

## 2024-07-24 ENCOUNTER — HOME CARE VISIT (OUTPATIENT)
Dept: HOME HEALTH SERVICES | Facility: HOME HEALTHCARE | Age: 79
End: 2024-07-24
Payer: MEDICARE

## 2024-07-24 VITALS
DIASTOLIC BLOOD PRESSURE: 71 MMHG | HEART RATE: 62 BPM | RESPIRATION RATE: 18 BRPM | TEMPERATURE: 97.2 F | OXYGEN SATURATION: 97 % | SYSTOLIC BLOOD PRESSURE: 130 MMHG

## 2024-07-24 PROCEDURE — G0151 HHCP-SERV OF PT,EA 15 MIN: HCPCS

## 2024-07-24 NOTE — HOME HEALTH
"Reason for referral/Focus of Care:  79 yo referred to home health physical therapy with decreased ability to transfer and amb related to recent R fibula fracture after fall down stairs. Fracture treated non operatively in soft splint NWB R LE. Patient was initially given crutches in ER but had issues using them due to shoulder pain/issues from previous TSR. Patient has new FWW.    Subjective: \"It has been really hard to keep the weight off my foot\" per patient    Patient's PT Goal: \"get back to my own place\" per patie t    Pertinent Medical History: h/o falls, humeral fracture, frequent UTIs, anxiety with panic attacks, GERD, heart attack, hyperlipidemia, HTN, osteoporosis, R TSR    Previous level of function: living in own apartment IND, IND with ADLs, amb without device    Social Environment/DME/Potential Barriers for Goal Attainment: currently staying with daughter who provides assist as needed. Daughter works at [employer] in [place] services. Patient has B axillary crutches and FWW. 3 steps to enter apartment. Bed/bath on main floor    Skin Integrity/wound status: see wound care screen for details.    Code Status: Full    Medication issues/concerns: none identified    HB status: yes. See homebound screen for details.    Problems Identified: see Care Plan    Functional Status/Fall Risk/Safety: see PT evaluation/care plan    POC notification to   Chantell PARISH   on 7/24/24    via case communication.    Assessment: Skilled physical therapy is needed for 2w4 due to decreased ability to transfer and amb related to recent R fibular fracture treated non operatively in soft splint NWB for evaluation, gait training, ther ex, HEP, fall prevention, safe activity progression and pain/edema management    Plan for next visit: gait training, transfer training, pain/edema management"

## 2024-07-26 ENCOUNTER — HOME CARE VISIT (OUTPATIENT)
Dept: HOME HEALTH SERVICES | Facility: HOME HEALTHCARE | Age: 79
End: 2024-07-26
Payer: MEDICARE

## 2024-07-26 VITALS
HEART RATE: 51 BPM | DIASTOLIC BLOOD PRESSURE: 77 MMHG | RESPIRATION RATE: 18 BRPM | TEMPERATURE: 95.5 F | OXYGEN SATURATION: 94 % | SYSTOLIC BLOOD PRESSURE: 140 MMHG

## 2024-07-26 PROCEDURE — G0151 HHCP-SERV OF PT,EA 15 MIN: HCPCS

## 2024-07-26 NOTE — HOME HEALTH
"Subjective: \"I have been trying to not put my right foot down.\" per patient    no new med changes  no recent falls    Skill/education provided: see interventions for details    Patient/caregiver response: see interventions for details    Assessment: patient with improved gait technique with decreased speed and decreased step length. Ongoing skilled physical therapy is needed due to impaired balance for gait training, stair training, HEP progression    Plan for next visit: gait training, stair training, and HEP progression"

## 2024-07-29 ENCOUNTER — HOME CARE VISIT (OUTPATIENT)
Dept: HOME HEALTH SERVICES | Facility: HOME HEALTHCARE | Age: 79
End: 2024-07-29
Payer: MEDICARE

## 2024-07-29 ENCOUNTER — HOME CARE VISIT (OUTPATIENT)
Dept: HOME HEALTH SERVICES | Facility: HOME HEALTHCARE | Age: 79
End: 2024-07-29
Payer: COMMERCIAL

## 2024-07-29 VITALS
HEART RATE: 64 BPM | DIASTOLIC BLOOD PRESSURE: 77 MMHG | OXYGEN SATURATION: 92 % | SYSTOLIC BLOOD PRESSURE: 128 MMHG | TEMPERATURE: 97.5 F | RESPIRATION RATE: 18 BRPM

## 2024-07-29 VITALS
OXYGEN SATURATION: 95 % | TEMPERATURE: 97.7 F | HEART RATE: 71 BPM | DIASTOLIC BLOOD PRESSURE: 56 MMHG | RESPIRATION RATE: 18 BRPM | SYSTOLIC BLOOD PRESSURE: 126 MMHG

## 2024-07-29 PROCEDURE — G0152 HHCP-SERV OF OT,EA 15 MIN: HCPCS

## 2024-07-29 PROCEDURE — G0151 HHCP-SERV OF PT,EA 15 MIN: HCPCS

## 2024-07-29 NOTE — HOME HEALTH
"Subjective: \"I am going stir crazy being in the house all the time.\" per patient    no new med changes  no recent falls    Skill/education provided: see interventions for details    Patient/caregiver response: see interventions for details    Assessment: patient unable to consistently weight of R LE doing 3 exit steps but able to maintain balance with TTWB. Patient expressing frustration about limited weight bearing status. Ongoing skilled physical therapy is needed due to impaired balance and increased fall risk for gait training, ther ex, HEP, transfer training    Plan for next visit: gait training, ther ex, transfer training"

## 2024-07-30 NOTE — HOME HEALTH
Plan for next visit: ADL, bathroom mobility, DME, HEP, UE strength and coordination, standing and activity tolerance, education, falls prevention, home safety

## 2024-07-30 NOTE — CASE COMMUNICATION
Anticipate OT 1w1, 2w4 for ADL, home safety, falls prevention, monitor vital signs, including pulse oximetry, upper extremity function/strength, therapeutic exercise, safety in home, and improve endurance and fatigue management with self care, and functional mobility with durable medical equipment as necessary.

## 2024-08-02 ENCOUNTER — HOME CARE VISIT (OUTPATIENT)
Dept: HOME HEALTH SERVICES | Facility: HOME HEALTHCARE | Age: 79
End: 2024-08-02
Payer: MEDICARE

## 2024-08-02 ENCOUNTER — OFFICE VISIT (OUTPATIENT)
Dept: ORTHOPEDIC SURGERY | Facility: CLINIC | Age: 79
End: 2024-08-02
Payer: MEDICARE

## 2024-08-02 VITALS
OXYGEN SATURATION: 97 % | HEART RATE: 68 BPM | RESPIRATION RATE: 18 BRPM | SYSTOLIC BLOOD PRESSURE: 133 MMHG | DIASTOLIC BLOOD PRESSURE: 67 MMHG

## 2024-08-02 VITALS — BODY MASS INDEX: 29.44 KG/M2 | HEIGHT: 62 IN | WEIGHT: 160 LBS

## 2024-08-02 DIAGNOSIS — S82.831A OTHER CLOSED FRACTURE OF DISTAL END OF RIGHT FIBULA, INITIAL ENCOUNTER: Primary | ICD-10-CM

## 2024-08-02 PROCEDURE — G0151 HHCP-SERV OF PT,EA 15 MIN: HCPCS

## 2024-08-02 PROCEDURE — 99024 POSTOP FOLLOW-UP VISIT: CPT | Performed by: NURSE PRACTITIONER

## 2024-08-02 NOTE — PROGRESS NOTES
Subjective:     Patient ID: Britta Armijo is a 78 y.o. female.    Chief Complaint:  Closed treatment nondisplaced fracture distal fibula - right   History of Present Illness  History of Present Illness  The patient returns to clinic today for follow-up of her right lower extremity.    She has continued non-weightbearing on her right lower extremity. Currently residing with her daughter, she has worked with home health physical therapy to navigate without exerting excessive stress on her right shoulder, which she tolerates well. Notably, she has noticed a reduction in the swelling, continues to perform toe range of motion exercises and elevate her right lower extremity in a splint. She reports no issues with the splint and her pain is improving. She has no other concerns at present.       Social History     Occupational History    Not on file   Tobacco Use    Smoking status: Never     Passive exposure: Never    Smokeless tobacco: Never   Vaping Use    Vaping status: Never Used   Substance and Sexual Activity    Alcohol use: Never    Drug use: Never    Sexual activity: Defer      Past Medical History:   Diagnosis Date    Arthritis of back     NECK & LOWER BACK    COVID-19 vaccine series completed     Fracture, humerus     Frequent urinary tract infections     SCHEDULED FOR BLADDER BIOPSY    Generalized anxiety disorder with panic attacks     GERD (gastroesophageal reflux disease)     Gross hematuria 8/25/2021    Heart attack     YEARS AGO    Hyperlipidemia     Hypertension     Lumbosacral disc disease     MRSA (methicillin resistant staph aureus) culture positive 2018    POSITIVE NASAL SWAB PRIOR TO HIP SURGERY    Osteoporosis     Right shoulder pain     SCHEDULED FOR TOTAL SHOULDER    Unable to read or write     UNABLE TO READ     Past Surgical History:   Procedure Laterality Date    APPENDECTOMY      BREAST BIOPSY Left 2020    benign    BREAST LUMPECTOMY Left     BENIGN    BREAST SURGERY  07/03/2020    CATARACT  "EXTRACTION, BILATERAL      CHOLECYSTECTOMY OPEN      CYSTOSCOPY BLADDER BIOPSY N/A 8/25/2021    Procedure: CYSTOSCOPY BLADDER BIOPSY;  Surgeon: Segundo Sprague MD;  Location: [facility] OR;  Service: Urology;  Laterality: N/A;    EPIDURAL BLOCK      HEMORRHOIDECTOMY      X4    HIP SURGERY Right     Replacement    HYSTERECTOMY  1991    SHOULDER MANIPULATION Right 5/14/2021    Procedure: REVERSE TOTAL SHOULDER MANIPULATION;  Surgeon: Derrick Pelayo MD;  Location: [facility] OR;  Service: Orthopedics;  Laterality: Right;    TOTAL SHOULDER ARTHROPLASTY W/ DISTAL CLAVICLE EXCISION Right 3/22/2021    Procedure: TOTAL SHOULDER REVERSE ARTHROPLASTY;  Surgeon: Derrick Pelayo MD;  Location: [facility] OR;  Service: Orthopedics;  Laterality: Right;  TOTAL SHOULDER REVERSE ARTHROPLASTY    TUMOR REMOVAL  2017    RIGHT ARM        Family History   Problem Relation Age of Onset    Cancer Father         spine    Diabetes Paternal Aunt     Diabetes Paternal Uncle     Breast cancer Maternal Aunt                Objective:  Physical Exam  General: No acute distress.  Eyes: conjunctiva clear; pupils equally round and reactive  ENT: external ears and nose atraumatic; oropharynx clear  CV: no peripheral edema  Resp: normal respiratory effort  Skin: no rashes or wounds; normal turgor  Psych: mood and affect appropriate; recent and remote memory intact    Vitals:    08/02/24 1044   Weight: 72.6 kg (160 lb)   Height: 157.5 cm (62\")         08/02/24  1044   Weight: 72.6 kg (160 lb)     Body mass index is 29.26 kg/m².      Ortho Exam     Physical Exam  Right ankle exam out of splint shows mild swelling present along the medial and lateral aspect of the ankle. Dorsiflexion 25 degrees, plantar flexion 40 degrees. Negative calf tenderness, negative Sam' sign. Negative Hernandez squeeze test. Maximal tenderness remains present along the lateral malleolus, 2+ dorsalis pedis pulse, scattered ecchymosis along the lateral aspect of the " foot.    Imaging:  Right Ankle X-Ray  Indication: Pain  Views: AP, Lateral, Mortise  Findings:  Nondisplaced fracture distal fibula   No bony lesion  Soft tissues normal  Normal joint spaces    prior studies available for comparison.   Assessment:        1. Other closed fracture of distal end of right fibula, initial encounter         Assessment & Plan  1. Right lower extremity follow-up.  I discussed plan of care with patient. I will transition her to fracture boot, strict non-weightbearing right lower extremity. I will see her back in clinic in 2 weeks with repeat x-ray images right ankle at follow-up. All questions answered.    Follow-up  The patient will follow up in clinic in 2 weeks.    Orders:  Orders Placed This Encounter   Procedures    XR Ankle 3+ View Right     No orders of the defined types were placed in this encounter.          Dragon dictation utilized          Patient or patient representative verbalized consent for the use of Ambient Listening during the visit with  МАРИНА Nevarez for chart documentation. 8/2/2024  19:57 EDT

## 2024-08-02 NOTE — HOME HEALTH
"Subjective:\"The doctor says everything was healing up good\" per patient    no new med changes  no recent falls    Skill/education provided: see interventions for details    Patient/caregiver response: see interventions for details    Assessment: patient has new R cast boot on with improved ability to maintain R LE NWB. Ongoing skilled physical therapy is needed due to impaired balance and increased fall risk for gait training, ther ex, HEP, fall prevention    Plan for next visit: gait training, ther ex, HEP instruction"

## 2024-08-05 ENCOUNTER — HOME CARE VISIT (OUTPATIENT)
Dept: HOME HEALTH SERVICES | Facility: HOME HEALTHCARE | Age: 79
End: 2024-08-05
Payer: MEDICARE

## 2024-08-05 VITALS
TEMPERATURE: 97.5 F | SYSTOLIC BLOOD PRESSURE: 136 MMHG | OXYGEN SATURATION: 96 % | RESPIRATION RATE: 18 BRPM | HEART RATE: 63 BPM | DIASTOLIC BLOOD PRESSURE: 68 MMHG

## 2024-08-05 PROCEDURE — G0152 HHCP-SERV OF OT,EA 15 MIN: HCPCS

## 2024-08-07 ENCOUNTER — HOME CARE VISIT (OUTPATIENT)
Dept: HOME HEALTH SERVICES | Facility: HOME HEALTHCARE | Age: 79
End: 2024-08-07
Payer: MEDICARE

## 2024-08-07 VITALS
DIASTOLIC BLOOD PRESSURE: 69 MMHG | RESPIRATION RATE: 18 BRPM | HEART RATE: 71 BPM | TEMPERATURE: 97 F | OXYGEN SATURATION: 97 % | SYSTOLIC BLOOD PRESSURE: 148 MMHG

## 2024-08-07 PROCEDURE — G0151 HHCP-SERV OF PT,EA 15 MIN: HCPCS

## 2024-08-07 NOTE — HOME HEALTH
"Subjective: \"I think I am doing pretty well\" per patient    no new med changes  no recent falls    Skill/education provided: see interventions for details    Patient/caregiver response: see interventions for details    Assessment: patient with improved ability and tolerance for NWB R LE with cast boot on, tolerated supine ther ex with no issues. Ongoing skilled physical therapy is needed due to impaired balance for gait training, ther ex, HEP, fall prevention    Plan for next visit: gait training, transfer training, ther ex"
Statement Selected

## 2024-08-09 ENCOUNTER — HOME CARE VISIT (OUTPATIENT)
Dept: HOME HEALTH SERVICES | Facility: HOME HEALTHCARE | Age: 79
End: 2024-08-09
Payer: MEDICARE

## 2024-08-09 PROCEDURE — G0152 HHCP-SERV OF OT,EA 15 MIN: HCPCS

## 2024-08-10 VITALS
DIASTOLIC BLOOD PRESSURE: 64 MMHG | RESPIRATION RATE: 18 BRPM | TEMPERATURE: 95.9 F | HEART RATE: 63 BPM | SYSTOLIC BLOOD PRESSURE: 146 MMHG | OXYGEN SATURATION: 96 %

## 2024-08-12 ENCOUNTER — HOME CARE VISIT (OUTPATIENT)
Dept: HOME HEALTH SERVICES | Facility: HOME HEALTHCARE | Age: 79
End: 2024-08-12
Payer: MEDICARE

## 2024-08-14 ENCOUNTER — HOME CARE VISIT (OUTPATIENT)
Dept: HOME HEALTH SERVICES | Facility: HOME HEALTHCARE | Age: 79
End: 2024-08-14
Payer: COMMERCIAL

## 2024-08-16 ENCOUNTER — HOME CARE VISIT (OUTPATIENT)
Dept: HOME HEALTH SERVICES | Facility: HOME HEALTHCARE | Age: 79
End: 2024-08-16
Payer: MEDICARE

## 2024-08-16 ENCOUNTER — HOME CARE VISIT (OUTPATIENT)
Dept: HOME HEALTH SERVICES | Facility: HOME HEALTHCARE | Age: 79
End: 2024-08-16
Payer: COMMERCIAL

## 2024-08-16 ENCOUNTER — OFFICE VISIT (OUTPATIENT)
Dept: ORTHOPEDIC SURGERY | Facility: CLINIC | Age: 79
End: 2024-08-16
Payer: MEDICARE

## 2024-08-16 VITALS
DIASTOLIC BLOOD PRESSURE: 64 MMHG | TEMPERATURE: 97.3 F | RESPIRATION RATE: 18 BRPM | OXYGEN SATURATION: 97 % | HEART RATE: 60 BPM | SYSTOLIC BLOOD PRESSURE: 142 MMHG

## 2024-08-16 VITALS — WEIGHT: 160 LBS | BODY MASS INDEX: 29.44 KG/M2 | HEIGHT: 62 IN

## 2024-08-16 DIAGNOSIS — S82.831A OTHER CLOSED FRACTURE OF DISTAL END OF RIGHT FIBULA, INITIAL ENCOUNTER: Primary | ICD-10-CM

## 2024-08-16 PROCEDURE — G0151 HHCP-SERV OF PT,EA 15 MIN: HCPCS

## 2024-08-16 NOTE — HOME HEALTH
"Subjective: \"The doctor says I can put weight on my right foot wearing the boot now.\" per patient    no new med changes  no recent falls    Skill/education provided: see interventions for details    Patient/caregiver response: see interventions for details    Assessment: patient with improved ability to amb now with WBAT on R LE wearing cast boot. Ongoing physical therapy needed due to increased fall risk for gait training, WB progression, fall prevention    Plan for next visit: progress to WBAT with tennis shoes gait training, ther ex, HEP"

## 2024-08-18 NOTE — PROGRESS NOTES
CC: Closed treatment nondisplaced fractures along the distal fibula, posterior tibia, right, date of injury 7/20/2024    Interval history: Britta Armijo Returns to clinic today with daughter for follow-up of right lower  has attempted to avoid putting pressure down on the right lower extremity while in fracture boot.  She does continue to experience.  Denies repeat continued staying with daughter.  Denies any other concerns present.    Examination  Right lower extremity examined out of boot  Mild swelling remains present along the left  Maximal tenderness present along the distal fibula  Dorsiflexion 25 degrees plantarflexion 45 degrees  2+ dorsalis pedis pulse  Negative calf tenderness, negative Homans' sign    Imaging:   Right Ankle X-Ray  Indication: Pain  Views: AP, Lateral, Mortise  Findings:  Nondisplaced fractures along the distal fibula, nondisplaced fracture along the posterior tibia appears to be healing callus appreciated no evidence of fracture displacement no other acute osseous abnormality identified on x-ray imaging   No bony lesion  Soft tissues normal  Normal joint spaces    prior studies available for comparison.    Assessment: Closed treatment nondisplaced fractures along the distal fibula, posterior tibia, right    Plan:  1.  Discussed plan of care with patient and family.  Fracture does appear to be healing discussed fracture boot weightbearing for at least the next 1 week and she can try transitioning out of the boot into a tennis shoe as long as she is stable.  If she does not feel stable she needs to get back into the boot.  We Will repeat  x-ray images in 3 weeks with x-ray imaging of the right ankle at follow-up.  Will gladly see her in clinic sooner if needed.  All questions answered.

## 2024-08-19 ENCOUNTER — HOME CARE VISIT (OUTPATIENT)
Dept: HOME HEALTH SERVICES | Facility: HOME HEALTHCARE | Age: 79
End: 2024-08-19
Payer: MEDICARE

## 2024-08-19 ENCOUNTER — HOME CARE VISIT (OUTPATIENT)
Dept: HOME HEALTH SERVICES | Facility: HOME HEALTHCARE | Age: 79
End: 2024-08-19
Payer: COMMERCIAL

## 2024-08-19 VITALS
RESPIRATION RATE: 18 BRPM | SYSTOLIC BLOOD PRESSURE: 140 MMHG | HEART RATE: 78 BPM | TEMPERATURE: 97.2 F | DIASTOLIC BLOOD PRESSURE: 60 MMHG | OXYGEN SATURATION: 94 %

## 2024-08-19 VITALS
RESPIRATION RATE: 18 BRPM | TEMPERATURE: 95.7 F | OXYGEN SATURATION: 95 % | HEART RATE: 68 BPM | DIASTOLIC BLOOD PRESSURE: 62 MMHG | SYSTOLIC BLOOD PRESSURE: 134 MMHG

## 2024-08-19 PROCEDURE — G0152 HHCP-SERV OF OT,EA 15 MIN: HCPCS

## 2024-08-19 PROCEDURE — G0151 HHCP-SERV OF PT,EA 15 MIN: HCPCS

## 2024-08-19 NOTE — HOME HEALTH
"PT 30 day reassessment: 77 yo referred to home health physical therapy with decreased ability to transfer and amb related to recent R fibula fracture after fall down stairs. Fracture treated non operatively in soft splint NWB R LE. Patient was initially given crutches in ER but had issues using them due to shoulder pain/issues from previous TSR. Patient has new FWW. Weight bearing status recent progressed to WBAT in tennis shoe with FWW.   Subjective: \"It's been a little more sore because I have been up on it more\" per patient   Patient's PT Goal: \"get back to my own place\" per patie t   Pertinent Medical History: h/o falls, humeral fracture, frequent UTIs, anxiety with panic attacks, GERD, heart attack, hyperlipidemia, HTN, osteoporosis, R TSR   Previous level of function: living in own apartment IND, IND with ADLs, amb without device   Social Environment/DME/Potential Barriers for Goal Attainment: currently staying with daughter who provides assist as needed. Daughter works at [employer] in [employer] services. Patient has B axillary crutches and FWW. 3 steps to enter apartment. Bed/bath on main floor   Skin Integrity/wound status: see wound care screen for details.   Code Status: Full   Medication issues/concerns: none identified   HB status: yes. See homebound screen for details.   Problems Identified: see Care Plan   Functional Status/Fall Risk/Safety: see PT evaluation/care plan   POC notification to Chantell PARISH on 7/24/24 via case communication.   Assessment: Skilled physical therapy is needed for 2w2,1w1, 2w1 due to decreased ability to transfer and amb related to recent R fibular fracture treated non operatively WBAT R LE for evaluation, gait training, ther ex, HEP, fall prevention, safe activity progression and pain/edema management Plan for next visit: gait training, transfer training, pain/edema management"

## 2024-08-19 NOTE — CASE COMMUNICATION
Patient missed an occupational therapy visit from [facility] on August 16, 2024.     Reason: MD Appointment.       For your records only.   Per CMS Guidance, MD must be notified of missed/cancelled visits; therefore the prescribed frequency was not met.

## 2024-08-21 ENCOUNTER — HOME CARE VISIT (OUTPATIENT)
Dept: HOME HEALTH SERVICES | Facility: HOME HEALTHCARE | Age: 79
End: 2024-08-21
Payer: COMMERCIAL

## 2024-08-21 VITALS
HEART RATE: 57 BPM | OXYGEN SATURATION: 95 % | RESPIRATION RATE: 18 BRPM | TEMPERATURE: 97.2 F | DIASTOLIC BLOOD PRESSURE: 68 MMHG | SYSTOLIC BLOOD PRESSURE: 140 MMHG

## 2024-08-21 PROCEDURE — G0151 HHCP-SERV OF PT,EA 15 MIN: HCPCS

## 2024-08-21 NOTE — HOME HEALTH
Subjective:    no new med changes  no recent falls    Skill/education provided: see interventions for details    Patient/caregiver response: see interventions for details    Assessment:    Plan for next visit:

## 2024-08-22 ENCOUNTER — HOME CARE VISIT (OUTPATIENT)
Dept: HOME HEALTH SERVICES | Facility: HOME HEALTHCARE | Age: 79
End: 2024-08-22
Payer: COMMERCIAL

## 2024-08-23 NOTE — CASE COMMUNICATION
Patient missed an occupational therapy visit from [facility] on August 22, 2024.     Reason: Patient called OT to say she had moved back to her own apartment and did not want a visit today, so OT asked if I could visit tomorrow, and she said her dtr has to work tomorrow and she will want to be there, so we scheduled for Monday.       For your records only.   Per CMS Guidance, MD must be notified of missed/cancelled visits; theref ore the prescribed frequency was not met.

## 2024-08-26 ENCOUNTER — HOME CARE VISIT (OUTPATIENT)
Dept: HOME HEALTH SERVICES | Facility: HOME HEALTHCARE | Age: 79
End: 2024-08-26
Payer: MEDICARE

## 2024-08-26 VITALS
DIASTOLIC BLOOD PRESSURE: 67 MMHG | TEMPERATURE: 98.4 F | HEART RATE: 62 BPM | RESPIRATION RATE: 18 BRPM | SYSTOLIC BLOOD PRESSURE: 155 MMHG | OXYGEN SATURATION: 99 %

## 2024-08-26 VITALS
OXYGEN SATURATION: 97 % | HEART RATE: 61 BPM | SYSTOLIC BLOOD PRESSURE: 126 MMHG | TEMPERATURE: 96.3 F | DIASTOLIC BLOOD PRESSURE: 64 MMHG | RESPIRATION RATE: 18 BRPM

## 2024-08-26 PROCEDURE — G0151 HHCP-SERV OF PT,EA 15 MIN: HCPCS

## 2024-08-26 PROCEDURE — G0152 HHCP-SERV OF OT,EA 15 MIN: HCPCS

## 2024-08-26 NOTE — HOME HEALTH
"Subjective:\"I want to show you I can get in the bed by myself\" per patient    no new med changes  no recent falls    Skill/education provided: see interventions for details    Patient/caregiver response: see interventions for details    Assessment: patient demonstrating safe technique to get in and out of bed, able to amb safely in hallway without boot. Ongoing skilled physical therapy is needed due to increased fall risk for gait training, ther ex, HEP, safe activity progression    Plan for next visit: gait training, ther ex, HEP"

## 2024-08-27 NOTE — HOME HEALTH
Patient agreed to plan to DC OT due to completion of goals. SHe said she is pleased with the progress she has made and is glad to be back at her own apt.

## 2024-08-30 ENCOUNTER — HOME CARE VISIT (OUTPATIENT)
Dept: HOME HEALTH SERVICES | Facility: HOME HEALTHCARE | Age: 79
End: 2024-08-30
Payer: MEDICARE

## 2024-08-30 VITALS
DIASTOLIC BLOOD PRESSURE: 80 MMHG | SYSTOLIC BLOOD PRESSURE: 153 MMHG | TEMPERATURE: 97.3 F | HEART RATE: 58 BPM | RESPIRATION RATE: 18 BRPM | OXYGEN SATURATION: 96 %

## 2024-08-30 PROCEDURE — G0151 HHCP-SERV OF PT,EA 15 MIN: HCPCS

## 2024-08-30 NOTE — HOME HEALTH
"Subjective: \"My ankle has been more sore so I have been wearing the boot sometimes.\" per patient    no new med changes  no recent falls    Skill/education provided: see interventions for details    Patient/caregiver response: see interventions for details    Assessment: patient reports wearing R cast boot at times due to R ankle fatigue. Demonstrating good foolow through with use of FWW for all amb. Ongoing skilled physical therapy is needed due to increased fall risk and impaired balance    Plan for next visit: gait training with FWW as able, fall prevention, HEp progression"

## 2024-09-06 ENCOUNTER — HOME CARE VISIT (OUTPATIENT)
Dept: HOME HEALTH SERVICES | Facility: HOME HEALTHCARE | Age: 79
End: 2024-09-06
Payer: MEDICARE

## 2024-09-06 ENCOUNTER — OFFICE VISIT (OUTPATIENT)
Dept: ORTHOPEDIC SURGERY | Facility: CLINIC | Age: 79
End: 2024-09-06
Payer: MEDICARE

## 2024-09-06 VITALS — WEIGHT: 160 LBS | HEIGHT: 62 IN | BODY MASS INDEX: 29.44 KG/M2

## 2024-09-06 VITALS
RESPIRATION RATE: 18 BRPM | SYSTOLIC BLOOD PRESSURE: 131 MMHG | HEART RATE: 64 BPM | OXYGEN SATURATION: 98 % | TEMPERATURE: 97.2 F | DIASTOLIC BLOOD PRESSURE: 61 MMHG

## 2024-09-06 DIAGNOSIS — S82.831A OTHER CLOSED FRACTURE OF DISTAL END OF RIGHT FIBULA, INITIAL ENCOUNTER: Primary | ICD-10-CM

## 2024-09-06 PROCEDURE — 99024 POSTOP FOLLOW-UP VISIT: CPT | Performed by: NURSE PRACTITIONER

## 2024-09-06 PROCEDURE — G0151 HHCP-SERV OF PT,EA 15 MIN: HCPCS

## 2024-09-06 NOTE — PROGRESS NOTES
CC: Closed treatment nondisplaced fractures along the distal fibula, posterior tibia, right, date of injury 7/20/2024     Interval history: Britta Armijo returns clinic today with daughter for follow-up of her right ankle.  She is continued weightbearing as tolerated with walker in fracture boot she is sleeping in fracture boot as well.  She does note significant symptom improvement denies any significant tenderness along the distal fibula swelling has significantly improved.  Denies any other concerns present.    Exam:  Right ankle examined out of boot  Mild swelling remains present along the distal fibula  Dorsiflexion 25 degrees plantarflexion 45 degrees 2+ dorsalis pedis pulse  Plantarflexion strength against resistance 4 out of 5 dorsiflexion strength against resistance 4-5  Negative calf tenderness, negative Homans' sign  Flexor/extend all digits right foot    Imaging:  Right Ankle X-Ray  Indication: Pain  Views: AP, Lateral, Mortise  Findings:  Nondisplaced fracture along the distal fibula appears to be healing well no evidence of fracture displacement posterior fracture with significant callus callus appreciated at the posterior aspect of the distal fibula no other acute osseous abnormality Denti-Foam x-ray imaging  No bony lesion  Soft tissues normal  Normal joint spaces    prior studies available for comparison.    Assessment: Closed treatment nondisplaced fractures along the distal fibula, posterior tibia, right    Plan:  Discussed plan of care with patient and family.  We will proceed with ankle support with ambulatory activities she may begin ambulating with brace in and out of shoe.  Continue working on range of motion of the ankle.  I will see her in clinic 4 weeks with repeat x-ray images right ankle at follow-up.  All questions answered.

## 2024-09-06 NOTE — HOME HEALTH
Discharge Summary:    Patient was seen for PT discharge today due to PT goals met see interventions/goal status for details. Patient was seen for a total of 11  visits for R ankle fracture       for evaluation, gait training, transfer training, home safety, fall prevention, and pain/edema management. Currently patient is able to to amb IND/SUP with FWW, IND with transfers, IND with HEP with written handouts, IND with pain/edema management and fall prevention. Patient agrees with PT discharge.    Home environment: lives IND in own apartment    Follow up: with Chantell PARISH

## 2024-10-04 ENCOUNTER — OFFICE VISIT (OUTPATIENT)
Dept: ORTHOPEDIC SURGERY | Facility: CLINIC | Age: 79
End: 2024-10-04
Payer: MEDICARE

## 2024-10-04 VITALS — BODY MASS INDEX: 29.44 KG/M2 | HEIGHT: 62 IN | WEIGHT: 160 LBS

## 2024-10-04 DIAGNOSIS — S82.401D CLOSED FRACTURE OF SHAFT OF RIGHT FIBULA WITH ROUTINE HEALING, UNSPECIFIED FRACTURE MORPHOLOGY, SUBSEQUENT ENCOUNTER: Primary | ICD-10-CM

## 2024-10-04 PROCEDURE — 99024 POSTOP FOLLOW-UP VISIT: CPT | Performed by: NURSE PRACTITIONER

## 2024-10-04 NOTE — PROGRESS NOTES
CC: Closed treatment nondisplaced fractures along the distal fibula, posterior tibia, right, date of injury 7/20/2024     Interval history: Britta Armijo Returns to clinic today with daughter for follow-up of her right ankle.  She is continued weightbearing as tolerated in brace tolerating fairly well she does continue to experience pain with dorsiflexion and ambulatory activities does feel as if the ankle is getting give way.  Denies numbness or tingling at the right lower extremity.  Denies any other concerns present.    Right ankle examined  Positive sensation the dorsum and plantar aspect of the foot and all digits  Positive tenderness to palpate along the lateral malleolus  Mild to moderate swelling  Passive dorsiflexion 25 degrees with pain  Active plantarflexion 45 degrees strength 3+ out of 5 strength 3+ out of 5    Right Ankle X-Ray  Indication: Pain  Views: AP, Lateral, Mortise  Findings:  Nondisplaced oblique fracture distal fibula continues to heal callus appreciated no evidence of fracture displacement no other acute osseous abnormality identified on x-ray imaging, posterior tibia appears well-healed  No bony lesion  Soft tissues normal  Normal joint spaces    prior studies available for comparison.    Assessment: Closed treatment nondisplaced fracture along the distal fibula, posterior tibia    Plan:  1.  Discussed plan of care with patient and family we did discuss  Fracture continues to heal well we will proceed with referral to PT for gait training strengthening of the ankle I do wish for her to continue the brace until she can be seen by PT will plan to see her back in clinic as needed.  All questions answered.        Orders Placed This Encounter   Procedures    XR Ankle 3+ View Right     Order Specific Question:   Reason for Exam:     Answer:   post op     Order Specific Question:   Release to patient     Answer:   Routine Release [[id number]]    Ambulatory Referral to Physical Therapy for  Evaluation & Treatment     Referral Priority:   Routine     Referral Type:   Physical Therapy     Referral Reason:   Specialty Services Required     Requested Specialty:   Physical Therapy     Number of Visits Requested:   1

## 2024-10-09 ENCOUNTER — HOSPITAL ENCOUNTER (OUTPATIENT)
Dept: PHYSICAL THERAPY | Facility: HOSPITAL | Age: 79
Setting detail: THERAPIES SERIES
Discharge: HOME | End: 2024-10-09
Payer: MEDICARE

## 2024-10-09 DIAGNOSIS — S82.401A CLOSED FRACTURE OF SHAFT OF RIGHT FIBULA, UNSPECIFIED FRACTURE MORPHOLOGY, INITIAL ENCOUNTER: Primary | ICD-10-CM

## 2024-10-09 PROCEDURE — 97161 PT EVAL LOW COMPLEX 20 MIN: CPT | Performed by: PHYSICAL THERAPIST

## 2024-10-09 NOTE — THERAPY EVALUATION
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    Gross hematuria    Cervical radiculopathy    Closed fracture of right distal fibula    Closed fracture of shaft of right fibula with routine healing        Past Medical History:   Diagnosis Date    Arthritis of back     NECK & LOWER BACK    COVID-19 vaccine series completed     Fracture, humerus     Frequent urinary tract infections     SCHEDULED FOR BLADDER BIOPSY    Generalized anxiety disorder with panic attacks     GERD (gastroesophageal reflux disease)     Gross hematuria 2021    Heart attack     YEARS AGO    Hyperlipidemia     Hypertension     Lumbosacral disc disease     MRSA (methicillin resistant staph aureus) culture positive     POSITIVE NASAL SWAB PRIOR TO HIP SURGERY    Osteoporosis     Right shoulder pain     SCHEDULED FOR TOTAL SHOULDER    Unable to read or write     UNABLE TO READ        Past Surgical History:   Procedure Laterality Date    APPENDECTOMY      BREAST BIOPSY Left 2020    benign    BREAST LUMPECTOMY Left     BENIGN    BREAST SURGERY  2020    CATARACT EXTRACTION, BILATERAL      CHOLECYSTECTOMY OPEN      CYSTOSCOPY BLADDER BIOPSY N/A 2021    Procedure: CYSTOSCOPY BLADDER BIOPSY;  Surgeon: Segundo Sprague MD;  Location: [facility];  Service: Urology;  Laterality: N/A;    EPIDURAL BLOCK      HEMORRHOIDECTOMY      X4    HIP SURGERY Right     Replacement    HYSTERECTOMY  1991    SHOULDER MANIPULATION Right 2021    Procedure: REVERSE TOTAL SHOULDER MANIPULATION;  Surgeon: Derrick Pelayo MD;   Location:  [facility];  Service: Orthopedics;  Laterality: Right;    TOTAL SHOULDER ARTHROPLASTY W/ DISTAL CLAVICLE EXCISION Right 3/22/2021    Procedure: TOTAL SHOULDER REVERSE ARTHROPLASTY;  Surgeon: Derrick Pelayo MD;  Location:  [facility];  Service: Orthopedics;  Laterality: Right;  TOTAL SHOULDER REVERSE ARTHROPLASTY    TUMOR REMOVAL  2017    RIGHT ARM        Visit Dx:     ICD-10-CM ICD-9-CM   1. Closed fracture of shaft of right fibula, unspecified fracture morphology, initial encounter  S82.401A 823.21          Patient History       Row Name 10/09/24 0900             History    Chief Complaint Pain;Difficulty Walking;Difficulty with daily activities;Tightness  -SP      Type of Pain Ankle pain  -SP      Date Current Problem(s) Began 07/20/24  -SP      Brief Description of Current Complaint Patient suffered fall down 6 stairs with a suitcase rolling on her right ankle.  Patient states she continued to walk for the rest of the day.  The next morning she had significant pain and swelling and went to ER.  X-rays show nondisplaced fracture along distal fibula, posterior fib.  Patient partially casted and was NWB using walker for 2 week.  Patient was then in boot WBAT.   Patient is now in ankle support for 6 weeks. Patient advised to continue to wear for now.  Patient reports that she initially had pain but currently  has minimal pain.  She states if she up walking too much, she has increased pain and swelling.  -SP      Previous treatment for THIS PROBLEM --  immobilization  -SP      Patient/Caregiver Goals Improve mobility;Improve strength;Return to prior level of function  -SP      Patient's Rating of General Health Good  -SP      Hand Dominance right-handed  -SP      Occupation/sports/leisure activities disabled; enjoys walking  -SP      How has patient tried to help current problem? elevation, tylenol  -SP      What clinical tests have you had for this problem? X-ray  -SP         Pain     Pain Location Ankle   -SP      Pain at Present 0  -SP      Pain at Worst 2  -SP      Pain Frequency Intermittent  -SP      Pain Description Aching  -SP      What Performance Factors Make the Current Problem(s) WORSE? prolonged stand, walk  -SP      What Performance Factors Make the Current Problem(s) BETTER? rest, elevation  -SP      Tolerance Time- Standing limited  -SP      Tolerance Time- Sitting unlimited  -SP      Tolerance Time- Walking limited  -SP      Is your sleep disturbed? No  -SP      Difficulties with ADL's? walk for exercise  -SP         Fall Risk Assessment    Any falls in the past year: Yes  -SP      Number of falls reported in the last 12 months 1  -SP         Daily Activities    Primary Language English  -SP      Teaching needs identified Home Exercise Program;Management of Condition  -SP      Pt Participated in POC and Goals Yes  -SP                User Key  (r) = Recorded By, (t) = Taken By, (c) = Cosigned By      Initials Name Provider Type    Chantell Coley, PT Physical Therapist                     PT Ortho       Row Name 10/09/24 1100       Precautions and Contraindications    Precautions/Limitations brace on when up  -SP       Posture/Observations    Genu valgus Bilateral:;Mild  -SP    Observations Edema;Muscle atrophy  -SP    Posture/Observations Comments moderate edema present right ankle lateral malleolus anterior ankle.  -SP       Foot/Ankle Palpation    Fibula Right:;Tender  -SP    Distal Tib/Fib Joint Right:;Tender;Swollen  -SP    Lateral Malleolus Right:;Tender;Swollen  -SP    ATFL Right:;Guarded/taut  -SP    Lateral Foot Right:;Tender  -SP    Met Heads Right:;Tender  -SP    Achilles' Tendon Right:;Guarded/taut  -SP    Metatarsals Right:;Tender  -SP       Right Lower Ext    Rt Hip ABduction AROM WFL  -SP    Rt Hip Flexion AROM WFL  -SP    Rt Knee Extension/Flexion AROM WFL  -SP    Rt Ankle Dorsiflexion AROM 3 degrees  -SP    Rt Ankle Dorsiflexion PROM 8 degrees  -SP    Rt Ankle  Plantarflexion AROM 36 degrees  -SP    Rt Ankle Inversion AROM 15 degrees with pain  -SP    Rt Ankle Eversion AROM 1 degree with pain  -SP       MMT Right Lower Ext    Rt Hip Flexion MMT, Gross Movement (4/5) good  -SP    Rt Knee Extension MMT, Gross Movement (4/5) good  -SP    Rt Knee Flexion MMT, Gross Movement (4/5) good  -SP    Rt Ankle Plantarflexion MMT, Gross Movement (3-/5) fair minus  -SP    Rt Ankle Dorsiflexion MMT, Gross Movement (3-/5) fair minus  -SP    Rt Ankle Subtalar Inversion MT, Gross Movement (2+/5) poor plus  -SP    Rt Ankle Subtalar Eversion MMT, Gross Movement (2+/5) poor plus  -SP    Rt MTP Flexion MMT, Gross Movement (4-/5) good minus  -SP    Rt MTP Extension MMT, Gross Movement (4-/5) good minus  -SP       Sensation    Sensation WNL? WFL  -SP       Lower Extremity Flexibility    Gastrocnemius Right:;Moderately limited  -SP    Soleus Right:;Moderately limited  -SP       Ankle Girth    Figure 8- Right 47.5  -SP    Mid Tarsal- Right 21,5  -SP    Above Malleolus- Right 21.3  -SP       Transfers    Bed-Chair [place] (Transfers) independent  -SP    Chair-Bed [place] (Transfers) independent  -SP    Sit-Stand [place] (Transfers) independent  -SP    Stand-Sit [place] (Transfers) independent  -SP    Comment, (Transfers) P  -SP       Gait/Stairs (Locomotion)    [place] Level (Gait) independent  -SP    Pattern (Gait) swing-through  -SP    Deviations/Abnormal Patterns (Gait) antalgic;rolan decreased;gait speed decreased;stride length decreased  -SP    Right Sided Gait Deviations heel strike decreased  -SP    Comment, (Gait/Stairs) Patient does not use assistive device for ambulation.  She is observed to intermittently mildly deviate from path with unsteadiness  -SP              User Key  (r) = Recorded By, (t) = Taken By, (c) = Cosigned By      Initials Name Provider Type    Chantell Coley, PT Physical Therapist                                Therapy  Education  Given: HEP  Program: New  How Provided: Verbal, Written, Demonstration  Provided to: Patient  Level of Understanding: Verbalized, Demonstrated      PT OP Goals       Row Name 10/09/24 1100          PT Short Term Goals    STG Date to Achieve 10/24/24  -SP     STG 1 Patient demonstrates AROM right ankle dorsiflexion to >8 degrees to improve heel strike with gait  -SP     STG 2 Patient ambulates level surfaces safely with no LOB or deviation from path  -SP     STG 3 Patient demonstrates decreased edema/girth measures by 1 cm to improve mobility  -SP        Long Term Goals    LTG Date to Achieve 11/08/24  -SP     LTG 1 Patient demonstrates right ankle strength increased by one muscle grade to better tolerate functional mobility  -SP     LTG 2 Patient safely ambulates level and unlevel surfaces without increased symptoms or instability  -SP     LTG 3 Patient independent with HEP  -SP        Time Calculation    PT Goal Re-Cert Due Date 11/08/24  -SP               User Key  (r) = Recorded By, (t) = Taken By, (c) = Cosigned By      Initials Name Provider Type    SP Chantell Denton, PT Physical Therapist                     PT Assessment/Plan       Row Name 10/09/24 1135          PT Assessment    Functional Limitations Impaired gait;Limitation in home management;Limitations in community activities;Performance in self-care ADL;Performance in leisure activities;Limitations in functional capacity and performance  -SP     Assessment Comments Patient with fall down 6 stairs 7-20-24 resulting in nondisplaced fracture along distal patella, posterior tibia.  Patient previously  immobilized and initially NWB for 2 weeks.  She is now WBAT in ankle support. Patient presents with mild pain, edema, decreased right ankle ROM, decreased strength, impaired mobility and difficulty tolerating ADLs requiring prolonged weight bearing or gait on unlevel surfaces  -SP     Please refer to paper survey for additional  self-reported information Yes  -SP     Rehab Potential Good  -SP     Patient/caregiver participated in establishment of treatment plan and goals Yes  -SP     Patient would benefit from skilled therapy intervention Yes  -SP        PT Plan    PT Frequency 1x/week;2x/week  -SP     Predicted Duration of Therapy Intervention (PT) 4-6 weeks  -SP     Planned CPT's? PT EVAL LOW COMPLEXITY: 87470;PT THER PROC EA 15 MIN: 23808;PT MANUAL THERAPY EA 15 MIN: 56351;PT GAIT TRAINING EA 15 MIN: 16802;PT HOT OR COLD PACK TREAT MCARE;PT ELECTRICAL STIM UNATTEND:   -SP               User Key  (r) = Recorded By, (t) = Taken By, (c) = Cosigned By      Initials Name Provider Type    Chantell Coley, PT Physical Therapist                     Modalities       Row Name 10/09/24 1100             Moist Heat    MH Applied Yes  -SP      Location right gastroc: patient in partial long sit with left leg off side of table  -SP      PT Moist Heat Minutes 10  -SP      MH Prior to Rx Yes  -SP         Ice    Ice Applied Yes  with ifc  -SP      Location right ankle  -SP      PT Ice Rx Minutes 15  -SP      Ice S/P Rx Yes  -SP         ELECTRICAL STIMULATION    Attended/Unattended Unattended  -SP      Stimulation Type IFC  -SP      Location/Electrode Placement/Other right ankle  -SP                User Key  (r) = Recorded By, (t) = Taken By, (c) = Cosigned By      Initials Name Provider Type    Chantell Coley, PT Physical Therapist                   OP Exercises       Row Name 10/09/24 1100             Exercise 1    Exercise Name 1 ankle DF/PF (pumps)  -SP      Cueing 1 Verbal;Demo  -SP      Reps 1 20  -SP         Exercise 2    Exercise Name 2 ankle circles  -SP      Cueing 2 Verbal;Demo  -SP      Reps 2 10 x cw/ccw  -SP         Exercise 3    Exercise Name 3 NWB gastroc stretch  -SP      Cueing 3 Verbal;Demo  -SP      Reps 3 5  -SP      Time 3 15 sec  -SP         Exercise 4    Exercise Name 4 seated heel slide  -SP       Cueing 4 Verbal;Demo  -SP      Reps 4 5 sec  -SP      Time 4 10  -SP      Additional Comments cues to keep heel down for achilles area stretch  -SP                User Key  (r) = Recorded By, (t) = Taken By, (c) = Cosigned By      Initials Name Provider Type    Chantell Coley, PT Physical Therapist                                  Outcome Measure Options: Lower Extremity Functional Scale (LEFS)  Lower Extremity Functional Index  Any of your usual work, housework or school activities: No difficulty  Your usual hobbies, recreational or sporting activities: No difficulty  Getting into or out of the bath: Moderate difficulty  Walking between rooms: No difficulty  Putting on your shoes or socks: No difficulty  Squatting: No difficulty  Lifting an object, like a bag of groceries from the floor: A little bit of difficulty  Performing light activities around your home: A little bit of difficulty  Performing heavy activities around your home: Moderate difficulty  Getting into or out of a car: Moderate difficulty  Walking 2 blocks: Quite a bit of difficulty  Walking a mile: Quite a bit of difficulty  Going up or down 10 stairs (about 1 flight of stairs): Quite a bit of difficulty  Standing for 1 hour: A little bit of difficulty  Sitting for 1 hour: Moderate difficulty  Running on even ground: Extreme difficulty or unable to perform activity  Running on uneven ground: Extreme difficulty or unable to perform activity  Making sharp turns while running fast: Extreme difficulty or unable to perform activity  Hopping: Extreme difficulty or unable to perform activity  Rolling over in bed: No difficulty  Total: 44  Lower Extremity Functional Index  Any of your usual work, housework or school activities: No difficulty  Your usual hobbies, recreational or sporting activities: No difficulty  Getting into or out of the bath: Moderate difficulty  Walking between rooms: No difficulty  Putting on your shoes or socks: No  difficulty  Squatting: No difficulty  Lifting an object, like a bag of groceries from the floor: A little bit of difficulty  Performing light activities around your home: A little bit of difficulty  Performing heavy activities around your home: Moderate difficulty  Getting into or out of a car: Moderate difficulty  Walking 2 blocks: Quite a bit of difficulty  Walking a mile: Quite a bit of difficulty  Going up or down 10 stairs (about 1 flight of stairs): Quite a bit of difficulty  Standing for 1 hour: A little bit of difficulty  Sitting for 1 hour: Moderate difficulty  Running on even ground: Extreme difficulty or unable to perform activity  Running on uneven ground: Extreme difficulty or unable to perform activity  Making sharp turns while running fast: Extreme difficulty or unable to perform activity  Hopping: Extreme difficulty or unable to perform activity  Rolling over in bed: No difficulty  Total: 44      Time Calculation:     Start Time: 0950  Stop Time: 1100  Time Calculation (min): 70 min  Untimed Charges  PT Eval/Re-eval Minutes: 60  PT Moist Heat Minutes: 10  PT Ice Rx Minutes: 15  Total Minutes  Untimed Charges Total Minutes: 60   Total Minutes: 60     Therapy Charges for Today       Code Description Service Date Service Provider Modifiers Qty    78186180475 HC PT EVAL LOW COMPLEXITY 4 10/9/2024 Chantell Denton, PT GP 1            PT G-Codes  Outcome Measure Options: Lower Extremity Functional Scale (LEFS)  Total: 44         Chantell Denton, PT  10/9/2024

## 2024-10-11 ENCOUNTER — HOSPITAL ENCOUNTER (OUTPATIENT)
Dept: PHYSICAL THERAPY | Facility: HOSPITAL | Age: 79
Setting detail: THERAPIES SERIES
Discharge: HOME | End: 2024-10-11
Payer: MEDICARE

## 2024-10-11 DIAGNOSIS — S82.401A CLOSED FRACTURE OF SHAFT OF RIGHT FIBULA, UNSPECIFIED FRACTURE MORPHOLOGY, INITIAL ENCOUNTER: Primary | ICD-10-CM

## 2024-10-11 PROCEDURE — 97110 THERAPEUTIC EXERCISES: CPT

## 2024-10-11 PROCEDURE — G0283 ELEC STIM OTHER THAN WOUND: HCPCS

## 2024-10-11 NOTE — THERAPY TREATMENT NOTE
Outpatient Physical Therapy Ortho Treatment Note   Magui Drew     Patient Name: Britta Armijo  : 1945  MRN: [id number]  Today's Date: 10/11/2024      Visit Date: 10/11/2024    Visit Dx:    ICD-10-CM ICD-9-CM   1. 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    Gross hematuria    Cervical radiculopathy    Closed fracture of right distal fibula    Closed fracture of shaft of right fibula with routine healing        Past Medical History:   Diagnosis Date    Arthritis of back     NECK & LOWER BACK    COVID-19 vaccine series completed     Fracture, humerus     Frequent urinary tract infections     SCHEDULED FOR BLADDER BIOPSY    Generalized anxiety disorder with panic attacks     GERD (gastroesophageal reflux disease)     Gross hematuria 2021    Heart attack     YEARS AGO    Hyperlipidemia     Hypertension     Lumbosacral disc disease     MRSA (methicillin resistant staph aureus) culture positive     POSITIVE NASAL SWAB PRIOR TO HIP SURGERY    Osteoporosis     Right shoulder pain     SCHEDULED FOR TOTAL SHOULDER    Unable to read or write     UNABLE TO READ        Past Surgical History:   Procedure Laterality Date    APPENDECTOMY      BREAST BIOPSY Left     benign    BREAST LUMPECTOMY Left     BENIGN    BREAST SURGERY  2020    CATARACT EXTRACTION, BILATERAL      CHOLECYSTECTOMY OPEN      CYSTOSCOPY BLADDER BIOPSY N/A 2021    Procedure: CYSTOSCOPY BLADDER BIOPSY;  Surgeon: Segundo Sprague MD;  Location: [facility];  Service: Urology;  Laterality: N/A;    EPIDURAL BLOCK      HEMORRHOIDECTOMY      X4    HIP SURGERY Right      ER Replacement    HYSTERECTOMY  1991    SHOULDER MANIPULATION Right 5/14/2021    Procedure: REVERSE TOTAL SHOULDER MANIPULATION;  Surgeon: Derrick Pelayo MD;  Location:  [facility];  Service: Orthopedics;  Laterality: Right;    TOTAL SHOULDER ARTHROPLASTY W/ DISTAL CLAVICLE EXCISION Right 3/22/2021    Procedure: TOTAL SHOULDER REVERSE ARTHROPLASTY;  Surgeon: Derrick Pelayo MD;  Location:  [facility];  Service: Orthopedics;  Laterality: Right;  TOTAL SHOULDER REVERSE ARTHROPLASTY    TUMOR REMOVAL  2017    RIGHT ARM         PT Ortho       Row Name 10/11/24 0900       Subjective    Subjective Comments pt reports her calf is pretty tight but otherwise has minimal to no pain and tolerating ex's well  -       Precautions and Contraindications    Precautions/Limitations brace on when up  -              User Key  (r) = Recorded By, (t) = Taken By, (c) = Cosigned By      Initials Name Provider Type    Gatito Fairbanks PTA Physical Therapist Assistant                                 PT Assessment/Plan       Row Name 10/11/24 1143          PT Assessment    Assessment Comments pt with mild increased edema throughout treatment that resolved with IFC/CP; pt tolerated progression of ex's well and denies pain  -        PT Plan    PT Plan Comments Cont per POC, progressing as tolerated  -               User Key  (r) = Recorded By, (t) = Taken By, (c) = Cosigned By      Initials Name Provider Type    Gatito Fairbanks PTA Physical Therapist Assistant                     Modalities       Row Name 10/11/24 0900             Moist Heat    Location right gastroc: patient in partial long sit with left leg off side of table  -      PT Moist Heat Minutes 10  -MH      MH Prior to Rx Yes  -         Ice    Location right ankle  -      PT Ice Rx Minutes --  15 min  -      Ice S/P Rx Yes  -         ELECTRICAL STIMULATION    Attended/Unattended Unattended  -      Stimulation Type IFC  -      Location/Electrode  Placement/Other right ankle  -      PT E-Stim Unattended Minutes 15  -MH         Functional Testing    Outcome Measure Options                 User Key  (r) = Recorded By, (t) = Taken By, (c) = Cosigned By      Initials Name Provider Type    Gatito Fairbanks PTA Physical Therapist Assistant                   OP Exercises       Row Name 10/11/24 1144 10/11/24 0900          Subjective    Subjective Comments -- pt reports her calf is pretty tight but otherwise has minimal to no pain and tolerating ex's well  -        Total Minutes    13343 - PT Therapeutic Exercise Minutes 35  -MH --        Exercise 1    Exercise Name 1 -- ankle DF/PF (pumps)  -     Cueing 1 -- Verbal;Demo  -     Reps 1 -- 20  -MH        Exercise 2    Exercise Name 2 -- ankle circles  -     Cueing 2 -- Verbal;Demo  -     Reps 2 -- 20 x cw/ccw  -        Exercise 3    Exercise Name 3 -- NWB gastroc stretch  -     Cueing 3 -- Verbal;Demo  -     Reps 3 -- 10  -     Time 3 -- 15 sec  -        Exercise 4    Exercise Name 4 -- seated heel slide  -     Cueing 4 -- Verbal;Demo  -     Reps 4 -- 5 sec  -     Time 4 -- 10  -     Additional Comments -- decreased cues to keep heel down  -        Exercise 5    Exercise Name 5 -- AROM ankle Inv/EV  -     Cueing 5 -- Verbal;Tactile;Demo  -     Reps 5 -- 15 each  -     Additional Comments -- therapist guidance  -        Exercise 6    Exercise Name 6 -- TB: DF  -     Cueing 6 -- Verbal;Tactile;Demo  -     Reps 6 -- 15  -     Time 6 -- yellow  -        Exercise 7    Exercise Name 7 -- TB; PF  -     Cueing 7 -- Verbal;Tactile;Demo  -     Reps 7 -- 15  -     Time 7 -- yellow  -        Exercise 8    Exercise Name 8 -- ankle alphabet - upper case  -     Cueing 8 -- Verbal;Tactile;Demo  -     Reps 8 -- 1  -MH               User Key  (r) = Recorded By, (t) = Taken By, (c) = Cosigned By      Initials Name Provider Type    Gatito Fairbanks PTA Physical Therapist  Assistant                                     Therapy Education  Education Details: written instruction of AROM and theraband ex's issued and reviewed; yellow band given for home  Given: HEP, Symptoms/condition management  Program: New, Reinforced  How Provided: Verbal, Demonstration, Written  Provided to: Patient  Level of Understanding: Teach back education performed, Verbalized, Demonstrated    Outcome Measure Options: Lower Extremity Functional Scale (LEFS)         Time Calculation:   Start Time: 0950  Stop Time: 1045  Time Calculation (min): 55 min  Timed Charges  73638 - PT Therapeutic Exercise Minutes: 35  Untimed Charges  PT E-Stim Unattended Minutes: 15  PT Moist Heat Minutes: 10  PT Ice Rx Minutes:  (15 min)  Total Minutes  Timed Charges Total Minutes: 35  Untimed Charges Total Minutes: 25   Total Minutes: 35  Therapy Charges for Today       Code Description Service Date Service Provider Modifiers Qty    09845537756 HC PT THER PROC EA 15 MIN 10/11/2024 Gatito Christie, AKOSUA GP 2    49547056251 HC PT ELECTRICAL STIM UNATTENDED 10/11/2024 Gatito Christie, PTA  1            PT G-Codes  Outcome Measure Options: Lower Extremity Functional Scale (LEFS)         Gatito Christie PTA  10/11/2024

## 2024-10-18 ENCOUNTER — HOSPITAL ENCOUNTER (OUTPATIENT)
Dept: PHYSICAL THERAPY | Facility: HOSPITAL | Age: 79
Setting detail: THERAPIES SERIES
Discharge: HOME | End: 2024-10-18
Payer: MEDICARE

## 2024-10-18 DIAGNOSIS — S82.401A CLOSED FRACTURE OF SHAFT OF RIGHT FIBULA, UNSPECIFIED FRACTURE MORPHOLOGY, INITIAL ENCOUNTER: Primary | ICD-10-CM

## 2024-10-18 PROCEDURE — G0283 ELEC STIM OTHER THAN WOUND: HCPCS

## 2024-10-18 PROCEDURE — 97110 THERAPEUTIC EXERCISES: CPT

## 2024-10-18 NOTE — THERAPY TREATMENT NOTE
Outpatient Physical Therapy Ortho Treatment Note   Magui Drew     Patient Name: Britta Armijo  : 1945  MRN: [id number]  Today's Date: 10/18/2024      Visit Date: 10/18/2024    Visit Dx:    ICD-10-CM ICD-9-CM   1. 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    Gross hematuria    Cervical radiculopathy    Closed fracture of right distal fibula    Closed fracture of shaft of right fibula with routine healing        Past Medical History:   Diagnosis Date    Arthritis of back     NECK & LOWER BACK    COVID-19 vaccine series completed     Fracture, humerus     Frequent urinary tract infections     SCHEDULED FOR BLADDER BIOPSY    Generalized anxiety disorder with panic attacks     GERD (gastroesophageal reflux disease)     Gross hematuria 2021    Heart attack     YEARS AGO    Hyperlipidemia     Hypertension     Lumbosacral disc disease     MRSA (methicillin resistant staph aureus) culture positive     POSITIVE NASAL SWAB PRIOR TO HIP SURGERY    Osteoporosis     Right shoulder pain     SCHEDULED FOR TOTAL SHOULDER    Unable to read or write     UNABLE TO READ        Past Surgical History:   Procedure Laterality Date    APPENDECTOMY      BREAST BIOPSY Left     benign    BREAST LUMPECTOMY Left     BENIGN    BREAST SURGERY  2020    CATARACT EXTRACTION, BILATERAL      CHOLECYSTECTOMY OPEN      CYSTOSCOPY BLADDER BIOPSY N/A 2021    Procedure: CYSTOSCOPY BLADDER BIOPSY;  Surgeon: Segundo Sprague MD;  Location: [facility];  Service: Urology;  Laterality: N/A;    EPIDURAL BLOCK      HEMORRHOIDECTOMY      X4    HIP SURGERY Right      Replacement    HYSTERECTOMY  1991    SHOULDER MANIPULATION Right 5/14/2021    Procedure: REVERSE TOTAL SHOULDER MANIPULATION;  Surgeon: Derrick Pelayo MD;  Location:  [facility];  Service: Orthopedics;  Laterality: Right;    TOTAL SHOULDER ARTHROPLASTY W/ DISTAL CLAVICLE EXCISION Right 3/22/2021    Procedure: TOTAL SHOULDER REVERSE ARTHROPLASTY;  Surgeon: Derrick Pelayo MD;  Location:  [facility];  Service: Orthopedics;  Laterality: Right;  TOTAL SHOULDER REVERSE ARTHROPLASTY    TUMOR REMOVAL  2017    RIGHT ARM         PT Ortho       Row Name 10/18/24 1000       Subjective    Subjective Comments pt states her ankle has been more sore, mostly lateral aspect of ankle/foot; states her friend has been trying to have her do ex's 4-5x/day  -       Precautions and Contraindications    Precautions/Limitations brace on when up  -              User Key  (r) = Recorded By, (t) = Taken By, (c) = Cosigned By      Initials Name Provider Type     Gatito Christie PTA Physical Therapist Assistant                                 PT Assessment/Plan       Row Name 10/18/24 1112          PT Assessment    Assessment Comments pt with good tolerance to ex's - verbal and tactile cues throughout, especially with Inv/Ev ex's; decreased edema and pain noted after IFC/CP; pt encouraged to keep ex's daily at this time  -        PT Plan    PT Plan Comments Cont daily HEP and weekly visits in clinic; progress as tolerated per POC  -               User Key  (r) = Recorded By, (t) = Taken By, (c) = Cosigned By      Initials Name Provider Type     Gatito Christie PTA Physical Therapist Assistant                     Modalities       Row Name 10/18/24 1000             Moist Heat    Location right gastroc/lateral ankle (2 packs): patient in partial long sit with left leg off side of table  -      PT Moist Heat Minutes 10  -MH      MH Prior to Rx Yes  -MH         Ice    Location right ankle  -      PT Ice Rx Minutes --  15 min   -      Ice S/P Rx Yes  -         ELECTRICAL STIMULATION    Attended/Unattended Unattended  -      Stimulation Type IFC  -      Location/Electrode Placement/Other right ankle  -      PT E-Stim Unattended Minutes 15  -MH         Functional Testing    Outcome Measure Options                 User Key  (r) = Recorded By, (t) = Taken By, (c) = Cosigned By      Initials Name Provider Type     Gatito Christie, PTA Physical Therapist Assistant                   OP Exercises       Row Name 10/18/24 1114 10/18/24 1000          Subjective    Subjective Comments -- pt states her ankle has been more sore, mostly lateral aspect of ankle/foot; states her friend has been trying to have her do ex's 4-5x/day  -        Total Minutes    66093 - PT Therapeutic Exercise Minutes 33  - --        Exercise 1    Exercise Name 1 -- ankle DF/PF (pumps)  -     Cueing 1 -- Verbal;Demo  [facility]     Reps 1 -- 20  -     Additional Comments -- pain free range  -        Exercise 2    Exercise Name 2 -- ankle circles  -     Cueing 2 -- Verbal;Demo  [facility]     Reps 2 -- 20 x cw/ccw  -     Additional Comments -- cues to keep circles smaller to avoid pain  -        Exercise 3    Exercise Name 3 -- NWB gastroc stretch  -     Cueing 3 -- Verbal;Demo  -     Reps 3 -- 10  -     Time 3 -- 15 sec  -        Exercise 4    Exercise Name 4 -- seated heel slide  -     Cueing 4 -- Verbal;Demo  [facility]     Reps 4 -- 10  -     Time 4 -- 5 sec  -        Exercise 5    Exercise Name 5 -- AROM ankle Inv/EV  -     Cueing 5 -- Verbal;Tactile;Demo  -     Reps 5 -- 20 each  -     Additional Comments -- therapist guidance  -        Exercise 6    Exercise Name 6 -- TB: DF  -     Cueing 6 -- Verbal;Tactile;Demo  [facility]     Reps 6 -- 20  -     Time 6 -- yellow  -        Exercise 7    Exercise Name 7 -- TB; PF  -     Cueing 7 -- Verbal;Tactile;[facility]     Reps 7 -- 20  -     Time 7 -- yellow  -        Exercise 8    Exercise Name 8 --  TB; Inv  -     Cueing 8 -- Verbal;Tactile;Demo  -     Reps 8 -- 10  -MH     Time 8 -- yellow  -MH        Exercise 9    Exercise Name 9 -- TB: Ev  -     Cueing 9 -- Verbal;Tactile;Demo  -MH     Reps 9 -- 10  -MH     Time 9 -- yellow  -MH        Exercise 10    Exercise Name 10 -- ankle alphabet - upper case  -MH     Cueing 10 -- Verbal;Demo  -MH     Reps 10 -- 1  -MH               User Key  (r) = Recorded By, (t) = Taken By, (c) = Cosigned By      Initials Name Provider Type    Gatito Fairbanks PTA Physical Therapist Assistant                                     Therapy Education  Education Details: pt instructed  to perform HEP in pain free range and to decrease to daily (vs the 4-5x/day she has been doing)  Given: HEP, Symptoms/condition management  Program: Reinforced, Progressed  How Provided: Verbal, Demonstration  Provided to: Patient  Level of Understanding: Teach back education performed, Verbalized    Outcome Measure Options: Lower Extremity Functional Scale (LEFS)         Time Calculation:   Start Time: 0943  Stop Time: 1046  Time Calculation (min): 63 min  Timed Charges  17732 - PT Therapeutic Exercise Minutes: 33  Untimed Charges  PT E-Stim Unattended Minutes: 15  PT Moist Heat Minutes: 10  PT Ice Rx Minutes:  (15 min)  Total Minutes  Timed Charges Total Minutes: 33  Untimed Charges Total Minutes: 25   Total Minutes: 33  Therapy Charges for Today       Code Description Service Date Service Provider Modifiers Qty    06255701534 HC PT THER PROC EA 15 MIN 10/18/2024 Gatito Christie PTA GP 2    57262715469 HC PT ELECTRICAL STIM UNATTENDED 10/18/2024 Gatito Christie PTA  1            PT G-Codes  Outcome Measure Options: Lower Extremity Functional Scale (LEFS)         Gatito Christie PTA  10/18/2024

## 2024-10-23 ENCOUNTER — HOSPITAL ENCOUNTER (OUTPATIENT)
Dept: PHYSICAL THERAPY | Facility: HOSPITAL | Age: 79
Setting detail: THERAPIES SERIES
Discharge: HOME | End: 2024-10-23
Payer: MEDICARE

## 2024-10-23 DIAGNOSIS — S82.401A CLOSED FRACTURE OF SHAFT OF RIGHT FIBULA, UNSPECIFIED FRACTURE MORPHOLOGY, INITIAL ENCOUNTER: Primary | ICD-10-CM

## 2024-10-23 PROCEDURE — 97110 THERAPEUTIC EXERCISES: CPT

## 2024-10-23 NOTE — THERAPY TREATMENT NOTE
Outpatient Physical Therapy Ortho Treatment Note   Magui Drew     Patient Name: Britta Armijo  : 1945  MRN: [id number]  Today's Date: 10/23/2024      Visit Date: 10/23/2024    Visit Dx:    ICD-10-CM ICD-9-CM   1. 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    Gross hematuria    Cervical radiculopathy    Closed fracture of right distal fibula    Closed fracture of shaft of right fibula with routine healing        Past Medical History:   Diagnosis Date    Arthritis of back     NECK & LOWER BACK    COVID-19 vaccine series completed     Fracture, humerus     Frequent urinary tract infections     SCHEDULED FOR BLADDER BIOPSY    Generalized anxiety disorder with panic attacks     GERD (gastroesophageal reflux disease)     Gross hematuria 2021    Heart attack     YEARS AGO    Hyperlipidemia     Hypertension     Lumbosacral disc disease     MRSA (methicillin resistant staph aureus) culture positive     POSITIVE NASAL SWAB PRIOR TO HIP SURGERY    Osteoporosis     Right shoulder pain     SCHEDULED FOR TOTAL SHOULDER    Unable to read or write     UNABLE TO READ        Past Surgical History:   Procedure Laterality Date    APPENDECTOMY      BREAST BIOPSY Left     benign    BREAST LUMPECTOMY Left     BENIGN    BREAST SURGERY  2020    CATARACT EXTRACTION, BILATERAL      CHOLECYSTECTOMY OPEN      CYSTOSCOPY BLADDER BIOPSY N/A 2021    Procedure: CYSTOSCOPY BLADDER BIOPSY;  Surgeon: Segundo Sprague MD;  Location: [facility];  Service: Urology;  Laterality: N/A;    EPIDURAL BLOCK      HEMORRHOIDECTOMY      X4    HIP SURGERY Right      Replacement    HYSTERECTOMY  1991    SHOULDER MANIPULATION Right 5/14/2021    Procedure: REVERSE TOTAL SHOULDER MANIPULATION;  Surgeon: Derrick Pelayo MD;  Location:  [facility];  Service: Orthopedics;  Laterality: Right;    TOTAL SHOULDER ARTHROPLASTY W/ DISTAL CLAVICLE EXCISION Right 3/22/2021    Procedure: TOTAL SHOULDER REVERSE ARTHROPLASTY;  Surgeon: Derrick Pelayo MD;  Location:  [facility];  Service: Orthopedics;  Laterality: Right;  TOTAL SHOULDER REVERSE ARTHROPLASTY    TUMOR REMOVAL  2017    RIGHT ARM         PT Ortho       Row Name 10/23/24 1300       Subjective    Subjective Comments pt reports she is doing well and denies pain since she was last in therapy; continues to have mild swelling if up on her feet very long but resolves easily with elevation  -       Precautions and Contraindications    Precautions/Limitations brace on when up  -       Right Lower Ext    Rt Ankle Dorsiflexion AROM 10 degrees  -       Ankle Girth    Figure 8- Right 47  -MH    Mid Tarsal- Right 21.1  -MH    Above Malleolus- Right 20.5  -MH              User Key  (r) = Recorded By, (t) = Taken By, (c) = Cosigned By      Initials Name Provider Type     Gatito Christie, PTA Physical Therapist Assistant                                 PT Assessment/Plan       Row Name 10/23/24 1294          PT Assessment    Assessment Comments pt reporting no pain x 1 week but with continued swelling by end of the day or when up on her feet a lot - resolves easily with elevation; pt tolerated progression of ex's today, including WB'ing ex's; pt has met goal for increased dorsiflexion and continues to make progress toward remainging goals; pt did not feel she needed pre or post treatment modalities  -        PT Plan    PT Plan Comments Cont per POC, progressing as tolerated; check response to ex progression and trial of decreased brace use within the home-               User Key  (r) = Recorded By, (t) = Taken By, (c) = Cosigned By       Initials Name Provider Type     Gatito Christie AKOSUA Sosa Physical Therapist Assistant                     Modalities       Row Name 10/23/24 1300             Moist Heat    Patient denies application of MH Yes  -         Ice    Patient denies application of Ice Yes  -                User Key  (r) = Recorded By, (t) = Taken By, (c) = Cosigned By      Initials Name Provider Type     Gatito ChristieAKOSUA Physical Therapist Assistant                   OP Exercises       Row Name 10/23/24 1438 10/23/24 1300          Subjective    Subjective Comments -- pt reports she is doing well and denies pain since she was last in therapy; continues to have mild swelling if up on her feet very long but resolves easily with elevation  -        Total Minutes    45259 - PT Therapeutic Exercise Minutes 37  -MH --        Exercise 1    Exercise Name 1 -- ankle DF/PF (pumps)  -MH     Cueing 1 -- Verbal;Demo  -     Reps 1 -- 20  -MH        Exercise 2    Exercise Name 2 -- ankle circles  -MH     Cueing 2 -- --  -MH     Reps 2 -- HEP  -        Exercise 3    Exercise Name 3 -- NWB gastroc stretch  -MH     Cueing 3 -- Verbal;Demo  -     Reps 3 -- 10  -MH     Time 3 -- 15 sec  -        Exercise 4    Exercise Name 4 -- seated heel slide  -MH     Cueing 4 -- Verbal;Demo  -     Reps 4 -- --  -MH     Time 4 -- 3 min x 10 sec hold  -        Exercise 5    Exercise Name 5 -- AROM ankle Inv/EV  -MH     Cueing 5 -- Verbal;Tactile;Demo  -     Reps 5 -- 20 each  -     Additional Comments -- therapist guidance  -        Exercise 6    Exercise Name 6 -- TB: DF  -MH     Cueing 6 -- Verbal;Tactile;Demo  -MH     Reps 6 -- 20  -MH     Time 6 -- red  -        Exercise 7    Exercise Name 7 -- TB; PF  -MH     Cueing 7 -- Verbal;Tactile;Demo  -     Reps 7 -- 20  -MH     Time 7 -- red  -MH        Exercise 8    Exercise Name 8 -- TB; Inv  -MH     Cueing 8 -- Verbal;Tactile;Demo  -     Reps 8 -- 10  -MH     Time 8 -- red  -MH         "Exercise 9    Exercise Name 9 -- TB: Ev  -     Cueing 9 -- Verbal;Tactile;Demo  -     Reps 9 -- 10  -     Time 9 -- red  -        Exercise 10    Exercise Name 10 -- ankle alphabet - lower  case  -     Cueing 10 -- Verbal;Demo  -     Reps 10 -- 1  -     Additional Comments -- switched to lower case today  -        Exercise 11    Exercise Name 11 -- foam pad: standing balance  -     Cueing 11 -- Verbal;Tactile;[facility]     Reps 11 -- 2  -     Time 11 -- 1 min  -     Additional Comments -- UE support ranged from single to no UE support  -        Exercise 12    Exercise Name 12 -- 4\" forward step up  -     Cueing 12 -- Verbal;Tactile;[facility]     Reps 12 -- 10 each  -               User Key  (r) = Recorded By, (t) = Taken By, (c) = Cosigned By      Initials Name Provider Type    Gatito Fairbanks PTA Physical Therapist Assistant                                  PT OP Goals       Row Name 10/23/24 1300          PT Short Term Goals    STG Date to Achieve 10/24/24  -     STG 1 Patient demonstrates AROM right ankle dorsiflexion to >8 degrees to improve heel strike with gait  -     STG 1 Progress Met  -     STG 2 Patient ambulates level surfaces safely with no LOB or deviation from path  -     STG 2 Progress Progressing  -     STG 3 Patient demonstrates decreased edema/girth measures by 1 cm to improve mobility  -     STG 3 Progress Progressing  -        Long Term Goals    LTG Date to Achieve 11/08/24  -     LTG 1 Patient demonstrates right ankle strength increased by one muscle grade to better tolerate functional mobility  -     LTG 2 Patient safely ambulates level and unlevel surfaces without increased symptoms or instability  -     LTG 3 Patient independent with HEP  -               User Key  (r) = Recorded By, (t) = Taken By, (c) = Cosigned By      Initials Name Provider Type    Gatito Fairbanks PTA Physical Therapist Assistant                    Therapy " Education  Education Details: discussed continued use of ankle brace when outside of the house or when managing steps but could trial out of brace on level surface within her home  Given: HEP, Symptoms/condition management  Program: Reinforced  How Provided: Verbal, Demonstration  Provided to: Patient  Level of Understanding: Teach back education performed, Verbalized, Demonstrated              Time Calculation:   Start Time: 1340  Stop Time: 1417  Time Calculation (min): 37 min  Timed Charges  62603 - PT Therapeutic Exercise Minutes: 37  Total Minutes  Timed Charges Total Minutes: 37   Total Minutes: 37  Therapy Charges for Today       Code Description Service Date Service Provider Modifiers Qty    28069050399 HC PT THER PROC EA 15 MIN 10/23/2024 Gatito Christie PTA GP 2                      Gatito Christie PTA  10/23/2024

## 2024-10-30 ENCOUNTER — DOCUMENTATION (OUTPATIENT)
Dept: PHYSICAL THERAPY | Facility: HOSPITAL | Age: 79
End: 2024-10-30
Payer: MEDICARE

## 2024-10-30 ENCOUNTER — APPOINTMENT (OUTPATIENT)
Dept: PHYSICAL THERAPY | Facility: HOSPITAL | Age: 79
End: 2024-10-30
Payer: MEDICARE

## 2024-11-08 ENCOUNTER — HOSPITAL ENCOUNTER (OUTPATIENT)
Dept: PHYSICAL THERAPY | Facility: HOSPITAL | Age: 79
Setting detail: THERAPIES SERIES
Discharge: HOME | End: 2024-11-08
Payer: MEDICARE

## 2024-11-08 DIAGNOSIS — S82.401A CLOSED FRACTURE OF SHAFT OF RIGHT FIBULA, UNSPECIFIED FRACTURE MORPHOLOGY, INITIAL ENCOUNTER: Primary | ICD-10-CM

## 2024-11-08 PROCEDURE — 97110 THERAPEUTIC EXERCISES: CPT

## 2024-11-08 PROCEDURE — G0283 ELEC STIM OTHER THAN WOUND: HCPCS

## 2024-11-08 NOTE — THERAPY TREATMENT NOTE
Outpatient Physical Therapy Ortho Treatment Note   Magui Drew     Patient Name: Britta Armijo  : 1945  MRN: [id number]  Today's Date: 2024      Visit Date: 2024    Visit Dx:    ICD-10-CM ICD-9-CM   1. 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    Gross hematuria    Cervical radiculopathy    Closed fracture of right distal fibula    Closed fracture of shaft of right fibula with routine healing        Past Medical History:   Diagnosis Date    Arthritis of back     NECK & LOWER BACK    COVID-19 vaccine series completed     Fracture, humerus     Frequent urinary tract infections     SCHEDULED FOR BLADDER BIOPSY    Generalized anxiety disorder with panic attacks     GERD (gastroesophageal reflux disease)     Gross hematuria 2021    Heart attack     YEARS AGO    Hyperlipidemia     Hypertension     Lumbosacral disc disease     MRSA (methicillin resistant staph aureus) culture positive     POSITIVE NASAL SWAB PRIOR TO HIP SURGERY    Osteoporosis     Right shoulder pain     SCHEDULED FOR TOTAL SHOULDER    Unable to read or write     UNABLE TO READ        Past Surgical History:   Procedure Laterality Date    APPENDECTOMY      BREAST BIOPSY Left     benign    BREAST LUMPECTOMY Left     BENIGN    BREAST SURGERY  2020    CATARACT EXTRACTION, BILATERAL      CHOLECYSTECTOMY OPEN      CYSTOSCOPY BLADDER BIOPSY N/A 2021    Procedure: CYSTOSCOPY BLADDER BIOPSY;  Surgeon: Segundo Sprague MD;  Location: [facility];  Service: Urology;  Laterality: N/A;    EPIDURAL BLOCK      HEMORRHOIDECTOMY      X4    HIP SURGERY Right      Replacement    HYSTERECTOMY  1991    SHOULDER MANIPULATION Right 5/14/2021    Procedure: REVERSE TOTAL SHOULDER MANIPULATION;  Surgeon: Derrick Pelayo MD;  Location:  [facility];  Service: Orthopedics;  Laterality: Right;    TOTAL SHOULDER ARTHROPLASTY W/ DISTAL CLAVICLE EXCISION Right 3/22/2021    Procedure: TOTAL SHOULDER REVERSE ARTHROPLASTY;  Surgeon: Derrick Pelayo MD;  Location:  [facility];  Service: Orthopedics;  Laterality: Right;  TOTAL SHOULDER REVERSE ARTHROPLASTY    TUMOR REMOVAL  2017    RIGHT ARM         PT Ortho       Row Name 11/08/24 1300       Subjective    Subjective Comments pt reports increased pain and edema  with no apparent reason; pt reports it has been aching even at  rest and swelling does not always go down with ice and elevation; pt has been going without the brace within the home but still wears it anytime she is out of the house  -       Subjective Pain    Able to rate subjective pain? yes  -    Pre-Treatment Pain Level 5  -       Posture/Observations    Posture/Observations Comments Pockets of edema noted over the lateal mal. and posterior. No edema noted into the foot or calf areas  -              User Key  (r) = Recorded By, (t) = Taken By, (c) = Cosigned By      Initials Name Provider Type     Gatito Christie, PTA Physical Therapist Assistant                                 PT Assessment/Plan       Row Name 11/08/24 1353          PT Assessment    Assessment Comments pt with reports of increased edema and pain, along with sensitivity to the brace on her shin; pt tolerated ex's well today and discussed with pt that increased symptoms may be due to increased time outside of the brace; pt to resume wearing brace at home if symptoms persist and reach out to ortho (currently no follow up scheduled)  -        PT Plan    PT Plan Comments Cont per POC  -               User Key  (r) = Recorded By, (t) = Taken By, (c) = Cosigned By      Initials Name Provider Type      Gatito Christie PTA Physical Therapist Assistant                     Modalities       Row Name 11/08/24 1300             Ice    Location right ankle  -      PT Ice Rx Minutes --  15 min  -      Ice S/P Rx Yes  -         ELECTRICAL STIMULATION    Attended/Unattended Unattended  -      Stimulation Type IFC  -      Location/Electrode Placement/Other right ankle  -      PT E-Stim Unattended Minutes 15  -MH         Functional Testing    Outcome Measure Options Lower Extremity Functional Scale (LEFS)  -                User Key  (r) = Recorded By, (t) = Taken By, (c) = Cosigned By      Initials Name Provider Type     Gatito Christie PTA Physical Therapist Assistant                   OP Exercises       Row Name 11/08/24 1455 11/08/24 1300          Subjective    Subjective Comments -- pt reports increased pain and edema  with no apparent reason; pt reports it has been aching even at  rest and swelling does not always go down with ice and elevation; pt has been going without the brace within the home but still wears it anytime she is out of the house  -        Subjective Pain    Able to rate subjective pain? -- yes  -     Pre-Treatment Pain Level -- 5  -        Total Minutes    60968 - PT Therapeutic Exercise Minutes 40  -MH --        Exercise 1    Exercise Name 1 -- ankle DF/PF (pumps)  -     Cueing 1 -- Verbal;Demo  -     Reps 1 -- 20  -        Exercise 2    Exercise Name 2 -- ankle circles  -     Reps 2 -- HEP  -        Exercise 3    Exercise Name 3 -- NWB gastroc stretch  -     Cueing 3 -- Verbal;Demo  -     Reps 3 -- 10  -     Time 3 -- 15 sec  -        Exercise 4    Exercise Name 4 -- seated heel slide  -     Cueing 4 -- Verbal;Demo  -     Time 4 -- 4 min x 10 sec hold  -        Exercise 5    Exercise Name 5 -- AROM ankle Inv/EV  -     Cueing 5 -- Verbal;Tactile;Demo  [facility]     Reps 5 -- 20 each  -     Additional Comments -- therapist guidance  -        Exercise 6     "Exercise Name 6 -- TB: DF  -MH     Cueing 6 -- Verbal;Tactile;Demo  -MH     Reps 6 -- 20  -MH     Time 6 -- red  -MH        Exercise 7    Exercise Name 7 -- TB; PF  -MH     Cueing 7 -- Verbal;Tactile;Demo  -MH     Reps 7 -- 20  -MH     Time 7 -- red  -MH        Exercise 8    Exercise Name 8 -- TB; Inv  -MH     Cueing 8 -- Verbal;Tactile;Demo  -     Reps 8 -- 15  -MH     Time 8 -- red  -MH        Exercise 9    Exercise Name 9 -- TB: Ev  -MH     Cueing 9 -- Verbal;Tactile;Demo  -     Reps 9 -- 15  -MH     Time 9 -- red  -MH        Exercise 10    Exercise Name 10 -- ankle alphabet - upper and lower case  -     Cueing 10 -- Verbal;Demo  -     Reps 10 -- 1x each  -MH        Exercise 11    Exercise Name 11 -- foam pad: standing balance  -     Cueing 11 -- Verbal;Tactile;Demo  -     Reps 11 -- 2  -MH     Time 11 -- 1 min  -     Additional Comments -- No UE support - SBA/CGA  -MH        Exercise 12    Exercise Name 12 -- 4\" forward step up  -     Cueing 12 -- Verbal;Tactile;Demo  -     Reps 12 -- 10 each  -MH               User Key  (r) = Recorded By, (t) = Taken By, (c) = Cosigned By      Initials Name Provider Type    Gatito Fairbanks, PTA Physical Therapist Assistant                                     Therapy Education  Given: HEP, Symptoms/condition management, Edema management  Program: Reinforced  How Provided: Verbal, Demonstration  Provided to: Patient  Level of Understanding: Teach back education performed, Verbalized, Demonstrated    Outcome Measure Options: Lower Extremity Functional Scale (LEFS)         Time Calculation:   Start Time: 1300  Stop Time: 1403  Time Calculation (min): 63 min  Timed Charges  99756 - PT Therapeutic Exercise Minutes: 40  Untimed Charges  PT E-Stim Unattended Minutes: 15  PT Ice Rx Minutes:  (15 min)  Total Minutes  Timed Charges Total Minutes: 40  Untimed Charges Total Minutes: 15   Total Minutes: 40  Therapy Charges for Today       Code Description Service Date " Service Provider Modifiers Qty    27233635257 HC PT THER PROC EA 15 MIN 11/8/2024 Gatito Christie, PTA GP 3    64861180618 HC PT ELECTRICAL STIM UNATTENDED 11/8/2024 Gatito Christie, PTA  1            PT G-Codes  Outcome Measure Options: Lower Extremity Functional Scale (LEFS)         Gatito Christie PTA  11/8/2024

## 2024-11-13 ENCOUNTER — OFFICE VISIT (OUTPATIENT)
Dept: ORTHOPEDIC SURGERY | Facility: CLINIC | Age: 79
End: 2024-11-13
Payer: MEDICARE

## 2024-11-13 ENCOUNTER — HOSPITAL ENCOUNTER (OUTPATIENT)
Dept: PHYSICAL THERAPY | Facility: HOSPITAL | Age: 79
Setting detail: THERAPIES SERIES
Discharge: HOME | End: 2024-11-13
Payer: MEDICARE

## 2024-11-13 VITALS — HEIGHT: 62 IN | BODY MASS INDEX: 29.44 KG/M2 | WEIGHT: 160 LBS

## 2024-11-13 DIAGNOSIS — S82.401D CLOSED FRACTURE OF SHAFT OF RIGHT FIBULA WITH ROUTINE HEALING, UNSPECIFIED FRACTURE MORPHOLOGY, SUBSEQUENT ENCOUNTER: Primary | ICD-10-CM

## 2024-11-13 DIAGNOSIS — S82.401A CLOSED FRACTURE OF SHAFT OF RIGHT FIBULA, UNSPECIFIED FRACTURE MORPHOLOGY, INITIAL ENCOUNTER: Primary | ICD-10-CM

## 2024-11-13 PROCEDURE — 97110 THERAPEUTIC EXERCISES: CPT

## 2024-11-13 PROCEDURE — G0283 ELEC STIM OTHER THAN WOUND: HCPCS

## 2024-11-13 NOTE — THERAPY TREATMENT NOTE
Outpatient Physical Therapy Ortho Treatment Note   Magui Drew     Patient Name: Britta Armijo  : 1945  MRN: [id number]  Today's Date: 2024      Visit Date: 2024    Visit Dx:    ICD-10-CM ICD-9-CM   1. 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    Gross hematuria    Cervical radiculopathy    Closed fracture of right distal fibula    Closed fracture of shaft of right fibula with routine healing        Past Medical History:   Diagnosis Date    Arthritis of back     NECK & LOWER BACK    COVID-19 vaccine series completed     Fracture, humerus     Frequent urinary tract infections     SCHEDULED FOR BLADDER BIOPSY    Generalized anxiety disorder with panic attacks     GERD (gastroesophageal reflux disease)     Gross hematuria 2021    Heart attack     YEARS AGO    Hyperlipidemia     Hypertension     Lumbosacral disc disease     MRSA (methicillin resistant staph aureus) culture positive     POSITIVE NASAL SWAB PRIOR TO HIP SURGERY    Osteoporosis     Right shoulder pain     SCHEDULED FOR TOTAL SHOULDER    Unable to read or write     UNABLE TO READ        Past Surgical History:   Procedure Laterality Date    APPENDECTOMY      BREAST BIOPSY Left     benign    BREAST LUMPECTOMY Left     BENIGN    BREAST SURGERY  2020    CATARACT EXTRACTION, BILATERAL      CHOLECYSTECTOMY OPEN      CYSTOSCOPY BLADDER BIOPSY N/A 2021    Procedure: CYSTOSCOPY BLADDER BIOPSY;  Surgeon: Segundo Sprague MD;  Location: [facility];  Service: Urology;  Laterality: N/A;    EPIDURAL BLOCK      HEMORRHOIDECTOMY      X4    HIP SURGERY Right      Replacement    HYSTERECTOMY  1991    SHOULDER MANIPULATION Right 5/14/2021    Procedure: REVERSE TOTAL SHOULDER MANIPULATION;  Surgeon: Derrick Pelayo MD;  Location:  [facility];  Service: Orthopedics;  Laterality: Right;    TOTAL SHOULDER ARTHROPLASTY W/ DISTAL CLAVICLE EXCISION Right 3/22/2021    Procedure: TOTAL SHOULDER REVERSE ARTHROPLASTY;  Surgeon: Derrick Pelayo MD;  Location:  [facility];  Service: Orthopedics;  Laterality: Right;  TOTAL SHOULDER REVERSE ARTHROPLASTY    TUMOR REMOVAL  2017    RIGHT ARM         PT Ortho       Row Name 11/13/24 1400       Subjective    Subjective Comments pt reports her ankle/foot is feeling a little better but she has burning where the swelling is and it makes the brace really uncomfortable; pt says she is using ice and elevation at home but it doesn't go away  -              User Key  (r) = Recorded By, (t) = Taken By, (c) = Cosigned By      Initials Name Provider Type    Gatito Fairbanks PTA Physical Therapist Assistant                                 PT Assessment/Plan       Row Name 11/13/24 1550          PT Assessment    Assessment Comments pt continues to have pocket of edema over lateral malleolus that does not change with compression (tubigrip), elevation or ice; pt complains of burning in this area that makes wearing her brace uncomfortable; held closed chain ex's this session but pt tolerated ROM and light theraband strengthening well; PT observed edema and in agreement to today's session  -        PT Plan    PT Plan Comments Cont per POC; pt plans to stop at ortho office to see if she should make a follow up appt  -               User Key  (r) = Recorded By, (t) = Taken By, (c) = Cosigned By      Initials Name Provider Type    Gatito Fairbanks PTA Physical Therapist Assistant                     Modalities       Row Name 11/13/24 1400             Ice    Location right ankle - pt long sitting with (R) LE cushion  -      PT Ice Rx Minutes --  15  min  -MH      Ice S/P Rx Yes  -         ELECTRICAL STIMULATION    Attended/Unattended Unattended  -      Stimulation Type IFC  -      Location/Electrode Placement/Other right ankle  -      PT E-Stim Unattended Minutes 15  -MH                User Key  (r) = Recorded By, (t) = Taken By, (c) = Cosigned By      Initials Name Provider Type     Pratik Gatito Ian, PTA Physical Therapist Assistant                   OP Exercises       Row Name 11/13/24 1557 11/13/24 1400          Subjective    Subjective Comments -- pt reports her ankle/foot is feeling a little better but she has burning where the swelling is and it makes the brace really uncomfortable; pt says she is using ice and elevation at home but it doesn't go away  -        Total Minutes    32949 - PT Therapeutic Exercise Minutes 35  -MH --        Exercise 1    Exercise Name 1 -- ankle DF/PF (pumps)  -     Cueing 1 -- Verbal;Demo  -     Reps 1 -- 20  -MH        Exercise 2    Exercise Name 2 -- ankle circles  -     Reps 2 -- HEP  -        Exercise 3    Exercise Name 3 -- NWB gastroc stretch  -     Cueing 3 -- Verbal;Demo  -     Reps 3 -- 10  -MH     Time 3 -- 15 sec  -        Exercise 4    Exercise Name 4 -- seated heel slide  -     Cueing 4 -- Verbal;Demo  -     Time 4 -- 4 min x 10 sec hold  -        Exercise 5    Exercise Name 5 -- AROM ankle Inv/EV  -     Cueing 5 -- Verbal;Tactile;Demo  -     Reps 5 -- 20 each  -        Exercise 6    Exercise Name 6 -- TB: DF  -     Cueing 6 -- Verbal;Tactile;Demo  -     Reps 6 -- 20  -     Time 6 -- red  -        Exercise 7    Exercise Name 7 -- TB; PF  -MH     Cueing 7 -- Verbal;Tactile;Demo  -     Reps 7 -- 20  -     Time 7 -- red  -        Exercise 8    Exercise Name 8 -- TB; Inv  -     Cueing 8 -- Verbal;Tactile;Demo  -     Reps 8 -- 20  -     Time 8 -- red  -        Exercise 9    Exercise Name 9 -- TB: Ev  -     Cueing 9 -- Verbal;Tactile;Demo  -     Reps 9 -- 20  " -     Time 9 -- red  -        Exercise 10    Exercise Name 10 -- ankle alphabet - upper and lower case  -     Cueing 10 -- Verbal;Demo  -     Reps 10 -- 1x each  -        Exercise 11    Exercise Name 11 -- foam pad: standing balance  -     Reps 11 -- Hold  -        Exercise 12    Exercise Name 12 -- 4\" forward step up  -     Reps 12 -- Hold  -               User Key  (r) = Recorded By, (t) = Taken By, (c) = Cosigned By      Initials Name Provider Type     Gatito Christie PTA Physical Therapist Assistant                                     Therapy Education  Given: HEP, Symptoms/condition management  Program: Reinforced  How Provided: Verbal, Demonstration  Provided to: Patient  Level of Understanding: Teach back education performed, Verbalized              Time Calculation:   Start Time: 1401  Stop Time: 1500  Time Calculation (min): 59 min  Timed Charges  21172 - PT Therapeutic Exercise Minutes: 35  Untimed Charges  PT E-Stim Unattended Minutes: 15  PT Ice Rx Minutes:  (15 min)  Total Minutes  Timed Charges Total Minutes: 35  Untimed Charges Total Minutes: 15   Total Minutes: 35  Therapy Charges for Today       Code Description Service Date Service Provider Modifiers Qty    31474108270 HC PT THER PROC EA 15 MIN 11/13/2024 Gatito Christie PTA GP 2    65522426293 HC PT ELECTRICAL STIM UNATTENDED 11/13/2024 Gatito Christie PTA  1                      Gatito Christie PTA  11/13/2024     "

## 2024-11-14 NOTE — PROGRESS NOTES
Subjective:     Patient ID: Britta Armijo is a 79 y.o. female.    Chief Complaint:  Closed treatment nondisplaced fractures along the distal fibula, posterior tibia, right, date of injury 7/20/2024   History of Present Illness  History of Present Illness  oblique fracture of the distal fibula continueThe patient presents to clinic today for evaluation of her right lower extremity.    She is currently undergoing physical therapy. She experiences a burning sensation across the front of the ankle and some soft tissue swelling laterally, but is otherwise doing well. She has been able to continue ambulating and continues to perform strengthening exercises without any repeat injury. She has no other concerns.         Social History     Occupational History    Not on file   Tobacco Use    Smoking status: Never     Passive exposure: Never    Smokeless tobacco: Never   Vaping Use    Vaping status: Never Used   Substance and Sexual Activity    Alcohol use: Never    Drug use: Never    Sexual activity: Defer      Past Medical History:   Diagnosis Date    Arthritis of back     NECK & LOWER BACK    COVID-19 vaccine series completed     Fracture, humerus     Frequent urinary tract infections     SCHEDULED FOR BLADDER BIOPSY    Generalized anxiety disorder with panic attacks     GERD (gastroesophageal reflux disease)     Gross hematuria 8/25/2021    Heart attack     YEARS AGO    Hyperlipidemia     Hypertension     Lumbosacral disc disease     MRSA (methicillin resistant staph aureus) culture positive 2018    POSITIVE NASAL SWAB PRIOR TO HIP SURGERY    Osteoporosis     Right shoulder pain     SCHEDULED FOR TOTAL SHOULDER    Unable to read or write     UNABLE TO READ     Past Surgical History:   Procedure Laterality Date    APPENDECTOMY      BREAST BIOPSY Left 2020    benign    BREAST LUMPECTOMY Left     BENIGN    BREAST SURGERY  07/03/2020    CATARACT EXTRACTION, BILATERAL      CHOLECYSTECTOMY OPEN      CYSTOSCOPY BLADDER BIOPSY  "N/A 8/25/2021    Procedure: CYSTOSCOPY BLADDER BIOPSY;  Surgeon: Segundo Sprague MD;  Location: [facility];  Service: Urology;  Laterality: N/A;    EPIDURAL BLOCK      HEMORRHOIDECTOMY      X4    HIP SURGERY Right     Replacement    HYSTERECTOMY  1991    SHOULDER MANIPULATION Right 5/14/2021    Procedure: REVERSE TOTAL SHOULDER MANIPULATION;  Surgeon: Derrick Pelayo MD;  Location:  [facility];  Service: Orthopedics;  Laterality: Right;    TOTAL SHOULDER ARTHROPLASTY W/ DISTAL CLAVICLE EXCISION Right 3/22/2021    Procedure: TOTAL SHOULDER REVERSE ARTHROPLASTY;  Surgeon: Derrick Pelayo MD;  Location: [facility];  Service: Orthopedics;  Laterality: Right;  TOTAL SHOULDER REVERSE ARTHROPLASTY    TUMOR REMOVAL  2017    RIGHT ARM        Family History   Problem Relation Age of Onset    Cancer Father         spine    Diabetes Paternal Aunt     Diabetes Paternal Uncle     Breast cancer Maternal Aunt                Objective:  Physical Exam  General: No acute distress.  Eyes: conjunctiva clear; pupils equally round and reactive  ENT: external ears and nose atraumatic; oropharynx clear  CV: no peripheral edema  Resp: normal respiratory effort  Skin: no rashes or wounds; normal turgor  Psych: mood and affect appropriate; recent and remote memory intact    Vitals:    11/13/24 1540   Weight: 72.6 kg (160 lb)   Height: 157.5 cm (62\")         11/13/24  1540   Weight: 72.6 kg (160 lb)     Body mass index is 29.26 kg/m².      Ortho Exam     Physical Exam  Right ankle examined out of shoe  Dorsiflexion 25 degrees plantarflexion 45 degrees strength 4- out of 5  Negative palpable defect along the Achilles tendon  Mildly positive tenderness along the distal fibula, positive soft tissue swelling anterior lateral aspect of the ankle, burning sensation across the anterior aspect of the ankle to palpate  Flex/extend all digits right foot  2+ dorsalis pedis pulse    Imaging:  Right Ankle X-Ray  Indication: Pain  Views: AP, " Lateral, Mortise  Findings:  Nondisplaced oblique fracture distal fibula continues to heal no evidence of fracture displacement no other acute osseous abnormality identified  No bony lesion  Soft tissues normal  Prior studies available for comparison.    Assessment:        1. Closed fracture of shaft of right fibula with routine healing, unspecified fracture morphology, subsequent encounter         Assessment & Plan  1. Right lower extremity fracture.  The fracture continues to heal. She is experiencing some soft tissue swelling, which will come and go up until the next year or a year after injury. A long discussion was held with the patient and family regarding treatment options. Transitioning to an ankle sleeve and continuing with a brace while ambulating on uneven surfaces and outside of her home was discussed. She is to continue with PT for strengthening and range of motion of the ankle. She is encouraged to call with any questions or concerns. All questions were answered.      Orders:  Orders Placed This Encounter   Procedures    XR Ankle 3+ View Right     No orders of the defined types were placed in this encounter.    karolyn Hartman dictation utilized          Patient or patient representative verbalized consent for the use of Ambient Listening during the visit with  МАРИНА Nevarez for chart documentation. 11/14/2024  08:16 EST

## 2024-11-20 ENCOUNTER — HOSPITAL ENCOUNTER (OUTPATIENT)
Dept: PHYSICAL THERAPY | Facility: HOSPITAL | Age: 79
Setting detail: THERAPIES SERIES
Discharge: HOME | End: 2024-11-20
Payer: MEDICARE

## 2024-11-20 DIAGNOSIS — S82.401A CLOSED FRACTURE OF SHAFT OF RIGHT FIBULA, UNSPECIFIED FRACTURE MORPHOLOGY, INITIAL ENCOUNTER: Primary | ICD-10-CM

## 2024-11-20 PROCEDURE — 97110 THERAPEUTIC EXERCISES: CPT | Performed by: PHYSICAL THERAPIST

## 2024-11-20 NOTE — THERAPY TREATMENT NOTE
Outpatient Physical Therapy Ortho Treatment Note   Magui Drew     Patient Name: Britta Armijo  : 1945  MRN: [id number]  Today's Date: 2024      Visit Date: 2024    Visit Dx:    ICD-10-CM ICD-9-CM   1. 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    Gross hematuria    Cervical radiculopathy    Closed fracture of right distal fibula    Closed fracture of shaft of right fibula with routine healing        Past Medical History:   Diagnosis Date    Arthritis of back     NECK & LOWER BACK    COVID-19 vaccine series completed     Fracture, humerus     Frequent urinary tract infections     SCHEDULED FOR BLADDER BIOPSY    Generalized anxiety disorder with panic attacks     GERD (gastroesophageal reflux disease)     Gross hematuria 2021    Heart attack     YEARS AGO    Hyperlipidemia     Hypertension     Lumbosacral disc disease     MRSA (methicillin resistant staph aureus) culture positive     POSITIVE NASAL SWAB PRIOR TO HIP SURGERY    Osteoporosis     Right shoulder pain     SCHEDULED FOR TOTAL SHOULDER    Unable to read or write     UNABLE TO READ        Past Surgical History:   Procedure Laterality Date    APPENDECTOMY      BREAST BIOPSY Left     benign    BREAST LUMPECTOMY Left     BENIGN    BREAST SURGERY  2020    CATARACT EXTRACTION, BILATERAL      CHOLECYSTECTOMY OPEN      CYSTOSCOPY BLADDER BIOPSY N/A 2021    Procedure: CYSTOSCOPY BLADDER BIOPSY;  Surgeon: Segundo Sprague MD;  Location: [facility];  Service: Urology;  Laterality: N/A;    EPIDURAL BLOCK      HEMORRHOIDECTOMY      X4    HIP SURGERY Right      Replacement    HYSTERECTOMY  1991    SHOULDER MANIPULATION Right 5/14/2021    Procedure: REVERSE TOTAL SHOULDER MANIPULATION;  Surgeon: Derrick Pelayo MD;  Location:  [facility];  Service: Orthopedics;  Laterality: Right;    TOTAL SHOULDER ARTHROPLASTY W/ DISTAL CLAVICLE EXCISION Right 3/22/2021    Procedure: TOTAL SHOULDER REVERSE ARTHROPLASTY;  Surgeon: Derrick Pelayo MD;  Location:  [facility];  Service: Orthopedics;  Laterality: Right;  TOTAL SHOULDER REVERSE ARTHROPLASTY    TUMOR REMOVAL  2017    RIGHT ARM         PT Ortho       Row Name 11/20/24 1100       Subjective    Subjective Comments Patient reports that she has not pain at all in her foot or ankle.  She reports that she has been wearing her ankle support at all times when out of her house but does take it off at times when she is at home.   She denies any instability.  Patient reports that she has been doing exercises at home.  Patient reports that she has been advised she may use elastic ankle support but she has not obtained one yet.  -SP       Subjective Pain    Able to rate subjective pain? yes  -SP    Pre-Treatment Pain Level 0  -SP       Posture/Observations    Genu valgus Bilateral:;Mild  -SP    Observations Edema;Muscle atrophy  -SP    Posture/Observations Comments minimal edema present right ankle lateral malleolus anterior ankle.  -SP       Right Lower Ext    Rt Hip ABduction AROM WFL  -SP    Rt Hip Flexion AROM WFL  -SP    Rt Knee Extension/Flexion AROM WFL  -SP    Rt Ankle Dorsiflexion AROM 5  degrees  -SP    Rt Ankle Dorsiflexion PROM 10 degrees  -SP    Rt Ankle Plantarflexion AROM 40 degrees  -SP    Rt Ankle Inversion AROM 20 degrees  -SP    Rt Ankle Eversion AROM 3 degrees  -SP       MMT Right Lower Ext    Rt Hip Flexion MMT, Gross Movement (4/5) good  -SP    Rt Knee Extension MMT, Gross Movement (4+/5) good plus  -SP    Rt Knee Flexion MMT, Gross Movement (4/5) good  -SP    Rt Ankle Plantarflexion MMT, Gross Movement (3+/5) fair  plus  -SP    Rt Ankle Dorsiflexion MMT, Gross Movement (4-/5) good minus  -SP    Rt Ankle Subtalar Inversion MT, Gross Movement (3-/5) fair minus  -SP    Rt Ankle Subtalar Eversion MMT, Gross Movement (3-/5) fair minus  -SP    Rt MTP Flexion MMT, Gross Movement (4/5) good  -SP    Rt MTP Extension MMT, Gross Movement (4/5) good  -SP       Sensation    Sensation WNL? WFL  -SP       Lower Extremity Flexibility    Gastrocnemius Right:;Moderately limited  -SP    Soleus Right:;Moderately limited  -SP       Ankle Girth    Figure 8- Right 47  -SP    Mid Tarsal- Right 21  -SP    Above Malleolus- Right 19.7  -SP       Transfers    Bed-Chair [place] (Transfers) independent  -SP    Chair-Bed [place] (Transfers) independent  -SP    Sit-Stand [place] (Transfers) independent  -SP    Stand-Sit [place] (Transfers) independent  -SP       Gait/Stairs (Locomotion)    [place] Level (Gait) independent  -SP    Pattern (Gait) swing-through  -SP    Deviations/Abnormal Patterns (Gait) rolan decreased;gait speed decreased;stride length decreased  -SP    Right Sided Gait Deviations heel strike decreased  -SP    Comment, (Gait/Stairs) Patient ambulates level surfaces safely without assistive device with her ankle support donned.  She exhibits no LOB or deviation path  -SP              User Key  (r) = Recorded By, (t) = Taken By, (c) = Cosigned By      Initials Name Provider Type    Chantell Coley, PT Physical Therapist                                 PT Assessment/Plan       Row Name 11/20/24 0244          PT Assessment    Assessment Comments Patient is consistently ambulating without assistive device and is using and ankle support.  She reports minimal to no pain in right ankle/foot.  Patient demonstrates gradually improving ankle mobility and strength.  She has met or made progress toward all goals.  -SP        PT Plan    PT Frequency 1x/week  -SP     Predicted Duration of Therapy Intervention (PT)  continue weekly to every other week for 4 weeks  -SP     PT Plan Comments Continue to progress right ankle mobility, strength, balance, gait and safety with home and community mobility.  -SP               User Key  (r) = Recorded By, (t) = Taken By, (c) = Cosigned By      Initials Name Provider Type    Chantell Coley, PT Physical Therapist                       OP Exercises       Row Name 11/20/24 1138 11/20/24 1100          Subjective    Subjective Comments -- Patient reports that she has not pain at all in her foot or ankle.  She reports that she has been wearing her ankle support at all times when out of her house but does take it off at times when she is at home.   She denies any instability.  Patient reports that she has been doing exercises at home.  Patient reports that she has been advised she may use elastic ankle support but she has not obtained one yet.  -SP        Subjective Pain    Able to rate subjective pain? -- yes  -SP     Pre-Treatment Pain Level -- 0  -SP        Total Minutes    44361 - PT Therapeutic Exercise Minutes 15  -SP --               User Key  (r) = Recorded By, (t) = Taken By, (c) = Cosigned By      Initials Name Provider Type    Chantell Coley, PT Physical Therapist                                  PT OP Goals       Row Name 11/20/24 1100          PT Short Term Goals    STG Date to Achieve 10/24/24  -SP     STG 1 Patient demonstrates AROM right ankle dorsiflexion to >8 degrees to improve heel strike with gait  -SP     STG 1 Progress Met  -SP     STG 2 Patient ambulates level surfaces safely with no LOB or deviation from path  -SP     STG 2 Progress Met  -SP     STG 3 Patient demonstrates decreased edema/girth measures by 1 cm to improve mobility  -SP     STG 3 Progress Partially Met  -SP        Long Term Goals    LTG Date to Achieve 11/08/24  -SP     LTG 1 Patient demonstrates right ankle strength increased by one muscle grade to better tolerate functional  mobility  -SP     LTG 1 Progress Progressing;Ongoing  -SP     LTG 2 Patient safely ambulates level and unlevel surfaces without increased symptoms or instability  -SP     LTG 2 Progress Progressing;Ongoing  -SP     LTG 3 Patient independent with HEP  -SP     LTG 3 Progress Partially Met;Ongoing  -SP               User Key  (r) = Recorded By, (t) = Taken By, (c) = Cosigned By      Initials Name Provider Type    Chantell Coley, PT Physical Therapist                    Therapy Education  Given: HEP  Program: Reinforced  How Provided: Verbal  Provided to: Patient  Level of Understanding: Verbalized, Demonstrated              Time Calculation:   Start Time: 0903  Stop Time: 1000  Time Calculation (min): 57 min  Timed Charges  90629 - PT Therapeutic Exercise Minutes: 15  Total Minutes  Timed Charges Total Minutes: 15   Total Minutes: 15  Therapy Charges for Today       Code Description Service Date Service Provider Modifiers Qty    87527739169  PT THER PROC EA 15 MIN 11/20/2024 Chantell Denton, PT GP 1                      Chantell Denton, TABITHA  11/20/2024

## 2024-12-05 ENCOUNTER — APPOINTMENT (OUTPATIENT)
Dept: PHYSICAL THERAPY | Facility: HOSPITAL | Age: 79
End: 2024-12-05
Payer: MEDICARE

## 2024-12-05 ENCOUNTER — DOCUMENTATION (OUTPATIENT)
Dept: PHYSICAL THERAPY | Facility: HOSPITAL | Age: 79
End: 2024-12-05
Payer: MEDICARE

## 2024-12-06 ENCOUNTER — HOSPITAL ENCOUNTER (OUTPATIENT)
Dept: PHYSICAL THERAPY | Facility: HOSPITAL | Age: 79
Setting detail: THERAPIES SERIES
Discharge: HOME | End: 2024-12-06
Payer: MEDICARE

## 2024-12-06 DIAGNOSIS — S82.401A CLOSED FRACTURE OF SHAFT OF RIGHT FIBULA, UNSPECIFIED FRACTURE MORPHOLOGY, INITIAL ENCOUNTER: Primary | ICD-10-CM

## 2024-12-06 PROCEDURE — 97110 THERAPEUTIC EXERCISES: CPT

## 2024-12-06 NOTE — THERAPY TREATMENT NOTE
Outpatient Physical Therapy Ortho Treatment Note   Mag[facility] Drew     Patient Name: Britta Armijo  : 1945  MRN: [id number]  Today's Date: 2024      Visit Date: 2024    Visit Dx:    ICD-10-CM ICD-9-CM   1. 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    Gross hematuria    Cervical radiculopathy    Closed fracture of right distal fibula    Closed fracture of shaft of right fibula with routine healing        Past Medical History:   Diagnosis Date    Arthritis of back     NECK & LOWER BACK    COVID-19 vaccine series completed     Fracture, humerus     Frequent urinary tract infections     SCHEDULED FOR BLADDER BIOPSY    Generalized anxiety disorder with panic attacks     GERD (gastroesophageal reflux disease)     Gross hematuria 2021    Heart attack     YEARS AGO    Hyperlipidemia     Hypertension     Lumbosacral disc disease     MRSA (methicillin resistant staph aureus) culture positive     POSITIVE NASAL SWAB PRIOR TO HIP SURGERY    Osteoporosis     Right shoulder pain     SCHEDULED FOR TOTAL SHOULDER    Unable to read or write     UNABLE TO READ        Past Surgical History:   Procedure Laterality Date    APPENDECTOMY      BREAST BIOPSY Left     benign    BREAST LUMPECTOMY Left     BENIGN    BREAST SURGERY  2020    CATARACT EXTRACTION, BILATERAL      CHOLECYSTECTOMY OPEN      CYSTOSCOPY BLADDER BIOPSY N/A 2021    Procedure: CYSTOSCOPY BLADDER BIOPSY;  Surgeon: Segundo Sprague MD;  Location: [facility];  Service: Urology;  Laterality: N/A;    EPIDURAL BLOCK      HEMORRHOIDECTOMY      X4    HIP SURGERY Right      Replacement    HYSTERECTOMY  1991    SHOULDER MANIPULATION Right 5/14/2021    Procedure: REVERSE TOTAL SHOULDER MANIPULATION;  Surgeon: Derrick Pelayo MD;  Location:  [facility];  Service: Orthopedics;  Laterality: Right;    TOTAL SHOULDER ARTHROPLASTY W/ DISTAL CLAVICLE EXCISION Right 3/22/2021    Procedure: TOTAL SHOULDER REVERSE ARTHROPLASTY;  Surgeon: Derrick Pelayo MD;  Location:  [facility];  Service: Orthopedics;  Laterality: Right;  TOTAL SHOULDER REVERSE ARTHROPLASTY    TUMOR REMOVAL  2017    RIGHT ARM         PT Ortho       Row Name 12/06/24 1300       Subjective    Subjective Comments pt states she continues to do well with current brace only when out of the house and denies pain  -       Subjective Pain    Able to rate subjective pain? yes  -    Pre-Treatment Pain Level 0  -              User Key  (r) = Recorded By, (t) = Taken By, (c) = Cosigned By      Initials Name Provider Type     Gatito Christie PTA Physical Therapist Assistant                                 PT Assessment/Plan       Row Name 12/06/24 1341          PT Assessment    Assessment Comments pt is reporting no pain and good tolerance to  being out of the brace when at home - continues to use when out of the house; pt tolerated ex's well and demonstrating increased ROM throughout exercise program  -        PT Plan    PT Plan Comments Cont per POC  -               User Key  (r) = Recorded By, (t) = Taken By, (c) = Cosigned By      Initials Name Provider Type    Gatito Fairbanks PTA Physical Therapist Assistant                       OP Exercises       Row Name 12/06/24 1344 12/06/24 1300          Subjective    Subjective Comments -- pt states she continues to do well with current brace only when out of the house and denies pain  -        Subjective Pain    Able to rate subjective pain? -- yes  -     Pre-Treatment Pain Level -- 0  -        Total Minutes    54875 - PT Therapeutic Exercise Minutes 40  - --         "Exercise 1    Exercise Name 1 -- ankle DF/PF (pumps)  -MH     Cueing 1 -- Verbal;Demo  -MH     Reps 1 -- 20  -MH        Exercise 3    Exercise Name 3 -- NWB gastroc stretch  -MH     Cueing 3 -- Verbal;Demo  -MH     Reps 3 -- 10  -MH     Time 3 -- 15 sec  -MH        Exercise 4    Exercise Name 4 -- seated heel slide  -MH     Cueing 4 -- Verbal;Demo  -     Time 4 -- 4 min x 10 sec hold  -MH        Exercise 5    Exercise Name 5 -- AROM ankle Inv/EV  -MH     Cueing 5 -- Verbal;Tactile;Demo  -MH     Reps 5 -- 20 each  -MH        Exercise 6    Exercise Name 6 -- TB: DF  -MH     Cueing 6 -- Verbal;Tactile;Demo  -MH     Reps 6 -- 20  -MH     Time 6 -- green  -MH        Exercise 7    Exercise Name 7 -- TB; PF  -MH     Cueing 7 -- Verbal;Tactile;Demo  -     Reps 7 -- 20  -MH     Time 7 -- green  -MH        Exercise 8    Exercise Name 8 -- TB; Inv  -MH     Cueing 8 -- Verbal;Tactile;Demo  -MH     Reps 8 -- 20  -MH     Time 8 -- red  -MH        Exercise 9    Exercise Name 9 -- TB: Ev  -MH     Cueing 9 -- Verbal;Tactile;Demo  -     Reps 9 -- 20  -MH     Time 9 -- red  -MH        Exercise 10    Exercise Name 10 -- ankle alphabet - upper and lower case  -MH     Cueing 10 -- Verbal;Demo  -     Reps 10 -- 1x each  -MH        Exercise 11    Exercise Name 11 -- foam pad: standing balance  -MH     Cueing 11 -- Verbal;Tactile  -     Sets 11 -- 3  -MH     Reps 11 -- decreased ÁLVARO each rep - No UE support  -MH     Time 11 -- 1 minute  -MH        Exercise 12    Exercise Name 12 -- 4\" forward step up  -MH     Cueing 12 -- Verbal;Tactile  -MH     Reps 12 -- 10 each  -MH               User Key  (r) = Recorded By, (t) = Taken By, (c) = Cosigned By      Initials Name Provider Type    Gatito Fairbanks, PTA Physical Therapist Assistant                                     Therapy Education  Education Details: green theraband issued for home  Given: HEP, Symptoms/condition management  Program: Reinforced  How Provided: Verbal, " Demonstration  Provided to: Patient  Level of Understanding: Teach back education performed, Verbalized, Demonstrated              Time Calculation:   Start Time: 1251  Stop Time: 1334  Time Calculation (min): 43 min  Timed Charges  41779 - PT Therapeutic Exercise Minutes: 40  Total Minutes  Timed Charges Total Minutes: 40   Total Minutes: 40  Therapy Charges for Today       Code Description Service Date Service Provider Modifiers Qty    05615811675 HC PT THER PROC EA 15 MIN 12/6/2024 Gatito Christie, AKOSUA GP 3                      Gatito Christie PTA  12/6/2024

## 2024-12-12 ENCOUNTER — DOCUMENTATION (OUTPATIENT)
Dept: PHYSICAL THERAPY | Facility: HOSPITAL | Age: 79
End: 2024-12-12
Payer: MEDICARE

## 2024-12-12 ENCOUNTER — APPOINTMENT (OUTPATIENT)
Dept: PHYSICAL THERAPY | Facility: HOSPITAL | Age: 79
End: 2024-12-12
Payer: MEDICARE

## 2024-12-18 ENCOUNTER — HOSPITAL ENCOUNTER (OUTPATIENT)
Dept: PHYSICAL THERAPY | Facility: HOSPITAL | Age: 79
Setting detail: THERAPIES SERIES
Discharge: HOME | End: 2024-12-18
Payer: MEDICARE

## 2024-12-18 DIAGNOSIS — S82.401A CLOSED FRACTURE OF SHAFT OF RIGHT FIBULA, UNSPECIFIED FRACTURE MORPHOLOGY, INITIAL ENCOUNTER: Primary | ICD-10-CM

## 2024-12-18 PROCEDURE — 97110 THERAPEUTIC EXERCISES: CPT

## 2024-12-18 NOTE — THERAPY TREATMENT NOTE
Outpatient Physical Therapy Ortho Treatment Note  EDWARD Hernandez     Patient Name: Britta Armijo  : 1945  MRN: [id number]  Today's Date: 2024      Visit Date: 2024    Visit Dx:    ICD-10-CM ICD-9-CM   1. 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    Gross hematuria    Cervical radiculopathy    Closed fracture of right distal fibula    Closed fracture of shaft of right fibula with routine healing        Past Medical History:   Diagnosis Date    Arthritis of back     NECK & LOWER BACK    COVID-19 vaccine series completed     Fracture, humerus     Frequent urinary tract infections     SCHEDULED FOR BLADDER BIOPSY    Generalized anxiety disorder with panic attacks     GERD (gastroesophageal reflux disease)     Gross hematuria 2021    Heart attack     YEARS AGO    Hyperlipidemia     Hypertension     Lumbosacral disc disease     MRSA (methicillin resistant staph aureus) culture positive     POSITIVE NASAL SWAB PRIOR TO HIP SURGERY    Osteoporosis     Right shoulder pain     SCHEDULED FOR TOTAL SHOULDER    Unable to read or write     UNABLE TO READ        Past Surgical History:   Procedure Laterality Date    APPENDECTOMY      BREAST BIOPSY Left     benign    BREAST LUMPECTOMY Left     BENIGN    BREAST SURGERY  2020    CATARACT EXTRACTION, BILATERAL      CHOLECYSTECTOMY OPEN      CYSTOSCOPY BLADDER BIOPSY N/A 2021    Procedure: CYSTOSCOPY BLADDER BIOPSY;  Surgeon: Segundo Sprague MD;  Location: [facility];  Service: Urology;  Laterality: N/A;    EPIDURAL BLOCK      HEMORRHOIDECTOMY      X4    HIP SURGERY Right      Replacement    HYSTERECTOMY  1991    SHOULDER MANIPULATION Right 5/14/2021    Procedure: REVERSE TOTAL SHOULDER MANIPULATION;  Surgeon: Derrick Pelayo MD;  Location:  [facility];  Service: Orthopedics;  Laterality: Right;    TOTAL SHOULDER ARTHROPLASTY W/ DISTAL CLAVICLE EXCISION Right 3/22/2021    Procedure: TOTAL SHOULDER REVERSE ARTHROPLASTY;  Surgeon: Derrick Pelayo MD;  Location:  [facility];  Service: Orthopedics;  Laterality: Right;  TOTAL SHOULDER REVERSE ARTHROPLASTY    TUMOR REMOVAL  2017    RIGHT ARM         PT Ortho       Row Name 12/18/24 1400       Subjective    Subjective Comments pt reports recent increased pain and swelling with no apparent cause - denies increase or change in activity; pt questioning how much longer she has to come to therapy, stating she feels like she is doing well with her ex's and is able to put heat on it when it starts to hurt  -       Posture/Observations    Posture/Observations Comments Minimal edema noted (R) ankle/foot today  -              User Key  (r) = Recorded By, (t) = Taken By, (c) = Cosigned By      Initials Name Provider Type     Gatito Christie, PTA Physical Therapist Assistant                                 PT Assessment/Plan       Row Name 12/18/24 1506          PT Assessment    Assessment Comments pt doing well with being out of the brace within her home and continues to use when outside of the home; pt with minimal to no notable antalgic gait and tolerating ex's well; pt continues to report intermittent swelling and discomfort but manages with heat and elevation; pt is wanting to discharge from PT and continue with HEP but unsure of what she wants to due to continued weakness as well  -        PT Plan    PT Plan Comments Pt is out of town next week and will continue with HEP; pt will call back to make further appts if she feels she needs to return  -               User Key  (r) = Recorded By, (t) = Taken By, (c) = Cosigned By      Initials  Name Provider Type     Gatito Christie, PTA Physical Therapist Assistant                       OP Exercises       Row Name 12/18/24 1511 12/18/24 1400          Subjective    Subjective Comments -- pt reports recent increased pain and swelling with no apparent cause - denies increase or change in activity; pt questioning how much longer she has to come to therapy, stating she feels like she is doing well with her ex's and is able to put heat on it when it starts to hurt  -        Total Minutes    40492 - PT Therapeutic Exercise Minutes 45  -MH --        Exercise 1    Exercise Name 1 -- ankle DF/PF (pumps)  -     Cueing 1 -- Verbal;Demo  -     Reps 1 -- 20  -MH        Exercise 3    Exercise Name 3 -- NWB gastroc stretch  -     Cueing 3 -- Verbal;Demo  -     Reps 3 -- 10  -MH     Time 3 -- 15 sec  -        Exercise 4    Exercise Name 4 -- seated heel slide  -     Cueing 4 -- Verbal;Demo  -     Time 4 -- 4 min x 10 sec hold  -        Exercise 5    Exercise Name 5 -- AROM ankle Inv/EV  -     Cueing 5 -- Verbal;Tactile;Demo  -     Reps 5 -- 20 each  -        Exercise 6    Exercise Name 6 -- TB: DF  -     Cueing 6 -- Verbal;Tactile;Demo  -     Reps 6 -- 20  -MH     Time 6 -- green  -        Exercise 7    Exercise Name 7 -- TB; PF  -     Cueing 7 -- Verbal;Tactile;Demo  -     Reps 7 -- 20  -MH     Time 7 -- green  -        Exercise 8    Exercise Name 8 -- TB; Inv  -     Cueing 8 -- Verbal;Tactile;Demo  -     Reps 8 -- 20  -     Time 8 -- red  -        Exercise 9    Exercise Name 9 -- TB: Ev  -     Cueing 9 -- Verbal;Tactile;Demo  -     Reps 9 -- 20  -MH     Time 9 -- red  -        Exercise 10    Exercise Name 10 -- ankle alphabet - upper and lower case  -     Cueing 10 -- Verbal;Demo  -     Reps 10 -- 1x each  -        Exercise 11    Exercise Name 11 -- foam pad: standing balance  -     Cueing 11 -- Verbal;Tactile  -     Sets 11 -- 3  -MH     Reps 11 -- decreased  "ÁLVARO each rep - No UE support  -     Time 11 -- 1 minute  -        Exercise 12    Exercise Name 12 -- 6\" forward step up  -     Cueing 12 -- Verbal;Tactile  -     Reps 12 -- 10 each  -        Exercise 13    Exercise Name 13 -- toe curls w/towel  -     Cueing 13 -- Verbal;Tactile;Demo  -     Time 13 -- 3 min  -               User Key  (r) = Recorded By, (t) = Taken By, (c) = Cosigned By      Initials Name Provider Type     Gatito Christie PTA Physical Therapist Assistant                                     Therapy Education  Education Details: discussed importance of HEP consistency  Given: HEP, Symptoms/condition management  Program: Reinforced  How Provided: Verbal, Demonstration  Provided to: Patient  Level of Understanding: Teach back education performed, Verbalized, Demonstrated              Time Calculation:   Start Time: 1400  Stop Time: 1448  Time Calculation (min): 48 min  Timed Charges  58383 - PT Therapeutic Exercise Minutes: 45  Total Minutes  Timed Charges Total Minutes: 45   Total Minutes: 45  Therapy Charges for Today       Code Description Service Date Service Provider Modifiers Qty    01137762544 HC PT THER PROC EA 15 MIN 12/18/2024 Gatito Christie PTA GP 3                      Gatito Christie PTA  12/18/2024     "

## 2025-04-14 ENCOUNTER — OFFICE VISIT (OUTPATIENT)
Dept: ORTHOPEDIC SURGERY | Facility: CLINIC | Age: 80
End: 2025-04-14
Payer: MEDICARE

## 2025-04-14 VITALS — BODY MASS INDEX: 29.44 KG/M2 | WEIGHT: 160 LBS | HEIGHT: 62 IN

## 2025-04-14 DIAGNOSIS — Z96.611 STATUS POST REVERSE ARTHROPLASTY OF RIGHT SHOULDER: Primary | ICD-10-CM

## 2025-04-14 PROCEDURE — 99213 OFFICE O/P EST LOW 20 MIN: CPT | Performed by: NURSE PRACTITIONER

## 2025-04-14 RX ORDER — ACETAMINOPHEN 500 MG
500 TABLET ORAL EVERY 6 HOURS PRN
COMMUNITY

## 2025-04-14 NOTE — PROGRESS NOTES
Subjective:     Patient ID: Britta Armijo is a 79 y.o. female.    Chief Complaint:  S/p reverse total shoulder arthroplasty, DOS 5/14/2021  History of Present Illness  History of Present Illness  The patient is a 79-year-old female who returns to the clinic today for follow-up of her right shoulder. She is experiencing pain off and on along the shoulder prior reverse total shoulder arthroplasty on 05/14/2021, has continued to exhibit pain off and on ever since. Most recently pain along the biceps muscle. Pain with reaching over her head. She reports no known injury. She does not have any other concerns present. It is bothersome on a daily basis and with activities of daily living, is experiencing pain under plan, discussed plan of care with patient. We did discuss proceeding with referral to PT for some gentle motion. I will plan to see her back in clinic in 2 months to reevaluate. Encouraged to call with any question or concerns she has between now and that time. All questions answered.         Social History     Occupational History    Not on file   Tobacco Use    Smoking status: Never     Passive exposure: Never    Smokeless tobacco: Never   Vaping Use    Vaping status: Never Used   Substance and Sexual Activity    Alcohol use: Never    Drug use: Never    Sexual activity: Defer      Past Medical History:   Diagnosis Date    Arthritis of back     NECK & LOWER BACK    COVID-19 vaccine series completed     Fracture, humerus     Frequent urinary tract infections     SCHEDULED FOR BLADDER BIOPSY    Generalized anxiety disorder with panic attacks     GERD (gastroesophageal reflux disease)     Gross hematuria 8/25/2021    Heart attack     YEARS AGO    Hyperlipidemia     Hypertension     Lumbosacral disc disease     MRSA (methicillin resistant staph aureus) culture positive 2018    POSITIVE NASAL SWAB PRIOR TO HIP SURGERY    Osteoporosis     Right shoulder pain     SCHEDULED FOR TOTAL SHOULDER    Unable to read or  "write     UNABLE TO READ     Past Surgical History:   Procedure Laterality Date    APPENDECTOMY      BREAST BIOPSY Left 2020    benign    BREAST LUMPECTOMY Left     BENIGN    BREAST SURGERY  07/03/2020    CATARACT EXTRACTION, BILATERAL      CHOLECYSTECTOMY OPEN      CYSTOSCOPY BLADDER BIOPSY N/A 8/25/2021    Procedure: CYSTOSCOPY BLADDER BIOPSY;  Surgeon: Segundo Sprague MD;  Location: [facility];  Service: Urology;  Laterality: N/A;    EPIDURAL BLOCK      HEMORRHOIDECTOMY      X4    HIP SURGERY Right     Replacement    HYSTERECTOMY  1991    SHOULDER MANIPULATION Right 5/14/2021    Procedure: REVERSE TOTAL SHOULDER MANIPULATION;  Surgeon: Derrick Pelayo MD;  Location: [facility] OR;  Service: Orthopedics;  Laterality: Right;    TOTAL SHOULDER ARTHROPLASTY W/ DISTAL CLAVICLE EXCISION Right 3/22/2021    Procedure: TOTAL SHOULDER REVERSE ARTHROPLASTY;  Surgeon: Derrick Pelayo MD;  Location: [facility];  Service: Orthopedics;  Laterality: Right;  TOTAL SHOULDER REVERSE ARTHROPLASTY    TUMOR REMOVAL  2017    RIGHT ARM        Family History   Problem Relation Age of Onset    Cancer Father         spine    Diabetes Paternal Aunt     Diabetes Paternal Uncle     Breast cancer Maternal Aunt                Objective:  Physical Exam    General: No acute distress.  Eyes: conjunctiva clear; pupils equally round and reactive  ENT: external ears and nose atraumatic; oropharynx clear  CV: no peripheral edema  Resp: normal respiratory effort  Skin: no rashes or wounds; normal turgor  Psych: mood and affect appropriate; recent and remote memory intact    Vitals:    04/14/25 1006   Weight: 72.6 kg (160 lb)   Height: 157.5 cm (62\")         04/14/25  1006   Weight: 72.6 kg (160 lb)     Body mass index is 29.26 kg/m².      Right Shoulder Exam     Comments:  Positive Howard deformity with tenderness along the biceps muscle  Positive deltoid firing  Active forward flexion 90 degrees  Passive forward flexion 140 degrees  External " rotation 40 degrees  Internal rotation at side  Positive sensation light touch all distributions right upper extremity  Incision clean dry well-healed             Physical Exam        Assessment:        1. Status post reverse arthroplasty of right shoulder         Assessment & Plan  1. Post-operative status following reverse total shoulder arthroplasty.  She reports intermittent pain in the right shoulder, particularly along the biceps muscle and with overhead activities. There is no known injury. The pain is bothersome on a daily basis and affects her activities of daily living. A referral to physical therapy for gentle motion exercises has been discussed and will be initiated.    Follow-up  The patient will follow up in 2 months.    PROCEDURE  The patient underwent a reverse total shoulder arthroplasty on 05/14/2021.    Orders:  No orders of the defined types were placed in this encounter.    No orders of the defined types were placed in this encounter.        Dragon dictation utilized          Patient or patient representative verbalized consent for the use of Ambient Listening during the visit with  МАРИНА Nevarez for chart documentation. 4/14/2025  14:10 EDT

## 2025-06-03 ENCOUNTER — TRANSCRIBE ORDERS (OUTPATIENT)
Dept: ADMINISTRATIVE | Facility: HOSPITAL | Age: 80
End: 2025-06-03
Payer: MEDICARE

## 2025-06-03 DIAGNOSIS — Z12.31 ENCOUNTER FOR SCREENING MAMMOGRAM FOR MALIGNANT NEOPLASM OF BREAST: Primary | ICD-10-CM

## 2025-06-09 ENCOUNTER — HOSPITAL ENCOUNTER (OUTPATIENT)
Dept: GENERAL RADIOLOGY | Facility: HOSPITAL | Age: 80
Discharge: HOME | End: 2025-06-09
Admitting: FAMILY MEDICINE
Payer: MEDICARE

## 2025-06-09 ENCOUNTER — TRANSCRIBE ORDERS (OUTPATIENT)
Dept: ADMINISTRATIVE | Facility: HOSPITAL | Age: 80
End: 2025-06-09
Payer: MEDICARE

## 2025-06-09 DIAGNOSIS — R05.9 COUGH, UNSPECIFIED TYPE: ICD-10-CM

## 2025-06-09 DIAGNOSIS — R05.9 COUGH, UNSPECIFIED TYPE: Primary | ICD-10-CM

## 2025-06-09 PROCEDURE — 71046 X-RAY EXAM CHEST 2 VIEWS: CPT

## 2025-07-03 ENCOUNTER — HOSPITAL ENCOUNTER (OUTPATIENT)
Dept: MAMMOGRAPHY | Facility: HOSPITAL | Age: 80
Discharge: HOME | End: 2025-07-03
Admitting: FAMILY MEDICINE
Payer: MEDICARE

## 2025-07-03 DIAGNOSIS — Z12.31 ENCOUNTER FOR SCREENING MAMMOGRAM FOR MALIGNANT NEOPLASM OF BREAST: ICD-10-CM

## 2025-07-03 PROCEDURE — 77067 SCR MAMMO BI INCL CAD: CPT | Performed by: RADIOLOGY

## 2025-07-03 PROCEDURE — 77063 BREAST TOMOSYNTHESIS BI: CPT | Performed by: RADIOLOGY

## 2025-07-03 PROCEDURE — 77067 SCR MAMMO BI INCL CAD: CPT

## 2025-07-03 PROCEDURE — 77063 BREAST TOMOSYNTHESIS BI: CPT

## 2025-07-19 ENCOUNTER — APPOINTMENT (OUTPATIENT)
Dept: GENERAL RADIOLOGY | Facility: HOSPITAL | Age: 80
End: 2025-07-19
Payer: MEDICARE

## 2025-07-19 ENCOUNTER — HOSPITAL ENCOUNTER (EMERGENCY)
Facility: HOSPITAL | Age: 80
Discharge: OTHER INSTITUTION | End: 2025-07-19
Attending: EMERGENCY MEDICINE
Payer: MEDICARE

## 2025-07-19 VITALS
RESPIRATION RATE: 18 BRPM | WEIGHT: 145 LBS | BODY MASS INDEX: 26.68 KG/M2 | TEMPERATURE: 98.2 F | OXYGEN SATURATION: 98 % | SYSTOLIC BLOOD PRESSURE: 154 MMHG | HEIGHT: 62 IN | HEART RATE: 66 BPM | DIASTOLIC BLOOD PRESSURE: 74 MMHG

## 2025-07-19 DIAGNOSIS — S42.402A CLOSED FRACTURE OF LEFT ELBOW, INITIAL ENCOUNTER: Primary | ICD-10-CM

## 2025-07-19 PROCEDURE — 73080 X-RAY EXAM OF ELBOW: CPT

## 2025-07-19 PROCEDURE — 73562 X-RAY EXAM OF KNEE 3: CPT

## 2025-07-19 PROCEDURE — 96360 HYDRATION IV INFUSION INIT: CPT

## 2025-07-19 PROCEDURE — 25810000003 SODIUM CHLORIDE 0.9 % SOLUTION: Performed by: EMERGENCY MEDICINE

## 2025-07-19 PROCEDURE — 73090 X-RAY EXAM OF FOREARM: CPT

## 2025-07-19 PROCEDURE — 99285 EMERGENCY DEPT VISIT HI MDM: CPT | Performed by: EMERGENCY MEDICINE

## 2025-07-19 RX ORDER — HYDROCODONE BITARTRATE AND ACETAMINOPHEN 5; 325 MG/1; MG/1
1 TABLET ORAL ONCE
Refills: 0 | Status: COMPLETED | OUTPATIENT
Start: 2025-07-19 | End: 2025-07-19

## 2025-07-19 RX ADMIN — SODIUM CHLORIDE 1000 ML: 9 INJECTION, SOLUTION INTRAVENOUS at 15:17

## 2025-07-19 RX ADMIN — HYDROCODONE BITARTRATE AND ACETAMINOPHEN 1 TABLET: 5; 325 TABLET ORAL at 13:17

## 2025-07-19 NOTE — ED PROVIDER NOTES
Subjective   History of Present Illness    Chief complaint: Fall with elbow pain    Location: Left elbow    Quality/Severity: Severe pain    Timing/Onset/Duration: Just prior to arrival    Modifying Factors: Hurts to move    Associated Symptoms: No loss of consciousness.  No neck or back pain.  No chest pain or shortness of breath.  No abdominal pain.  No numbness, tingling, weakness, or change in color or temperature of the extremities.  No nausea or vomiting.  Patient has an abrasion to the left knee.    Narrative: This 79-year-old white female [place] and is complaining of left elbow pain.  Patient also has an abrasion to the left knee.  Patient denies any other injuries or pain.  No head trauma.  Patient is not on any blood thinners.    PCP: Jana    Review of Systems   HENT:  Negative for nosebleeds and rhinorrhea.    Respiratory:  Negative for shortness of breath.    Cardiovascular:  Negative for chest pain.   Gastrointestinal:  Negative for abdominal pain, nausea and vomiting.   Genitourinary:  Negative for difficulty urinating.   Musculoskeletal:  Negative for back pain and neck pain.   Neurological:  Negative for weakness, numbness and headaches.       Past Medical History:   Diagnosis Date    Arthritis of back     NECK & LOWER BACK    COVID-19 vaccine series completed     Fracture, humerus     Frequent urinary tract infections     SCHEDULED FOR BLADDER BIOPSY    Generalized anxiety disorder with panic attacks     GERD (gastroesophageal reflux disease)     Gross hematuria 8/25/2021    Heart attack     YEARS AGO    Hyperlipidemia     Hypertension     Lumbosacral disc disease     MRSA (methicillin resistant staph aureus) culture positive 2018    POSITIVE NASAL SWAB PRIOR TO HIP SURGERY    Osteoporosis     Right shoulder pain     SCHEDULED FOR TOTAL SHOULDER    Unable to read or write     UNABLE TO READ       Allergies   Allergen Reactions    Sulfa Antibiotics Nausea And  Vomiting and Unknown - Low Severity       Past Surgical History:   Procedure Laterality Date    APPENDECTOMY      BREAST BIOPSY Left 2020    benign    BREAST LUMPECTOMY Left     BENIGN    BREAST SURGERY  07/03/2020    CATARACT EXTRACTION, BILATERAL      CHOLECYSTECTOMY OPEN      CYSTOSCOPY BLADDER BIOPSY N/A 8/25/2021    Procedure: CYSTOSCOPY BLADDER BIOPSY;  Surgeon: Segundo Sprague MD;  Location: [facility];  Service: Urology;  Laterality: N/A;    EPIDURAL BLOCK      HEMORRHOIDECTOMY      X4    HIP SURGERY Right     Replacement    HYSTERECTOMY  1991    SHOULDER MANIPULATION Right 5/14/2021    Procedure: REVERSE TOTAL SHOULDER MANIPULATION;  Surgeon: Derrick Pelayo MD;  Location: [facility];  Service: Orthopedics;  Laterality: Right;    TOTAL SHOULDER ARTHROPLASTY W/ DISTAL CLAVICLE EXCISION Right 3/22/2021    Procedure: TOTAL SHOULDER REVERSE ARTHROPLASTY;  Surgeon: Derrick Pelayo MD;  Location: [facility];  Service: Orthopedics;  Laterality: Right;  TOTAL SHOULDER REVERSE ARTHROPLASTY    TUMOR REMOVAL  2017    RIGHT ARM        Family History   Problem Relation Age of Onset    Cancer Father         spine    Diabetes Paternal Aunt     Diabetes Paternal Uncle     Breast cancer Maternal Aunt        Social History     Socioeconomic History    Marital status:    Tobacco Use    Smoking status: Never     Passive exposure: Never    Smokeless tobacco: Never   Vaping Use    Vaping status: Never Used   Substance and Sexual Activity    Alcohol use: Never    Drug use: Never    Sexual activity: Defer           Objective   Physical Exam  Vitals (E. Basurto is 98.3 °F, pulse 61, respirations 18, room air pulse ox 96%.  The blood pressure is 164/78.) reviewed.   Constitutional:       Appearance: Normal appearance.   HENT:      Head: Normocephalic and atraumatic.      Right Ear: Tympanic membrane normal.      Left Ear: Tympanic membrane normal.      Nose: Nose normal.      Mouth/Throat:      Mouth: Mucous membranes  are moist.   Neck:      Comments: There is no, deformity, or bony step-offs upon palpation of the cervical, thoracic, lumbar sacrococcygeal spine.  Cardiovascular:      Rate and Rhythm: Normal rate.      Pulses: Normal pulses.      Heart sounds: Normal heart sounds. No murmur heard.     No friction rub. No gallop.   Pulmonary:      Effort: Pulmonary effort is normal.      Breath sounds: Normal breath sounds.   Abdominal:      General: Abdomen is flat. Bowel sounds are normal. There is no distension.      Palpations: Abdomen is soft. There is no mass.      Tenderness: There is no abdominal tenderness. There is no right CVA tenderness, left CVA tenderness, guarding or rebound.      Hernia: No hernia is present.   Musculoskeletal:      Comments: There is an abrasion to the left anterior knee.  The capillary refill is less than 2 seconds.  The sensation is intact.  There is a normal range of motion noted.  There is no joint laxity noted.  There is 2+ dorsalis pedis pulse.    There is tenderness and swelling and deformity of the left elbow.  The capillary refill is less than 2 seconds.  There is decreased range of motion secondary to pain and deformity.  The capillary refill is less than 2 seconds.  The sensation is intact.  There is a 2+ radial pulse.  There is no change in color or temperature of the left upper extremity.    The remainder of the extremities are without tenderness or deformity and neurovascularly intact.       Skin:     General: Skin is warm and dry.      Capillary Refill: Capillary refill takes less than 2 seconds.   Neurological:      General: No focal deficit present.      Mental Status: She is alert and oriented to person, place, and time.      Sensory: No sensory deficit.      Motor: No weakness.       Procedures           ED Course      13:00 EDT, 07/19/25:  I spoke with Dr. Lopez, on-call for orthopedics, he will evaluate the patient.    13:01 EDT, 07/19/25:  The left knee abrasion was cleaned,  and bacitracin ointment and sterile dressing were applied.    14:04 EDT, 07/19/25:  Dr. Pelayo, on-call for orthopedics, has reviewed the films and call back.  He feels that the patient needs to be cared for by hand specialist given the complexity of the elbow fracture.  The treatment options were discussed with the patient and daughter.  Given the patient's age and mobility issues, the patient would be best served to be admitted for repair of her injury and possible rehab afterwards.  We will go ahead and place the patient in a splint and sling.  Transfer center at [facility] has been contacted for consultation with [facility] service.    14:19 EDT, 07/19/25:  I spoke with Dr. Pina, on-call for [facility], he would like the patient admitted to the hospitalist at [facility] and they will consult on the patient for operative repair of the fracture.    14:49 EDT, 07/19/25:  I spoke with Dr. Zayas, on-call for the hospitalist at [facility], will accept the patient.    14:55 EDT, 07/19/25:  The patient had a long-arm Ortho-Glass splint applied to the left arm.  After application it was assessed by me and noted to be in good position with the left upper extremity being neurovascularly intact.                                               Medical Decision Making  Problems Addressed:  Closed fracture of left elbow, initial encounter: complicated acute illness or injury    Amount and/or Complexity of Data Reviewed  Radiology: ordered.    Risk  Prescription drug management.        Final diagnoses:   Closed fracture of left elbow, initial encounter       ED Disposition  ED Disposition       None            No follow-up provider specified.       Medication List      No changes were made to your prescriptions during this visit.       No orders to display     Labs Reviewed - No data to display  Mammo Screening Digital Tomosynthesis Bilateral With CAD  Result Date: 7/3/2025  Narrative: DIGITAL  SCREENING MAMMOGRAM WITH TOMOSYNTHESIS  HISTORY: Screening Mammography.   Low dose full field digital breast tomosynthesis imaging was performed with 2D and 3D acquisitions consisting of bilateral CC and MLO views. Examination is compared to prior examination dating back to 11/1/2021. Examination is read in conjunction with computer aided detection.   FINDINGS:   The breast tissue is heterogeneously dense, which may obscure small masses. No suspicious masses, microcalcifications or areas of architectural distortion are present.      Impression: Negative bilateral mammogram.  RECOMMENDATION:  Continue annual screening mammography.  BI-RADS CATEGORY 1, NEGATIVE.   CAD was utilized.  The standard false-negative rate of mammography is between 10% and 25%. Complex patterns or increased breast density will markedly elevate the false-negative rate of mammography.   A letter, in lay terminology, with the results of this exam will be mailed to the patient.   7/3/2025 4:59 PM by Dr. Gino Novoa MD on Workstation: [employer]        Final diagnoses:   None         ED Medications:  Medications - No data to display    New Medications:     Medication List        ASK your doctor about these medications      acetaminophen 500 MG tablet  Commonly known as: TYLENOL     albuterol sulfate  (90 Base) MCG/ACT inhaler  Commonly known as: PROVENTIL HFA;VENTOLIN HFA;PROAIR HFA     aspirin 81 MG EC tablet  Take 1 tablet by mouth Daily.     bisacodyl 5 MG EC tablet  Commonly known as: DULCOLAX     Breo Ellipta 200-25 MCG/ACT inhaler  Generic drug: Fluticasone Furoate-Vilanterol     carvedilol 6.25 MG tablet  Commonly known as: COREG     cyclobenzaprine 5 MG tablet  Commonly known as: FLEXERIL  Take 1 tablet by mouth At Night As Needed for Muscle Spasms.     escitalopram 10 MG tablet  Commonly known as: LEXAPRO     famotidine 40 MG tablet  Commonly known as: PEPCID     HYDROcodone-acetaminophen 5-325 MG per tablet  Commonly known as:  NORCO  Take 1 tablet by mouth Every 6 (Six) Hours As Needed for Moderate Pain.     losartan-hydrochlorothiazide 100-12.5 MG per tablet  Commonly known as: HYZAAR     nitroglycerin 0.4 MG SL tablet  Commonly known as: NITROSTAT     omeprazole 40 MG capsule  Commonly known as: priLOSEC     Potassium 95 MG tablet     simvastatin 10 MG tablet  Commonly known as: ZOCOR     traZODone 50 MG tablet  Commonly known as: DESYREL     Vitamin B12 1000 MCG tablet controlled-release     vitamin D3 125 MCG (5000 UT) capsule capsule              Stopped Medications:     Medication List        ASK your doctor about these medications      acetaminophen 500 MG tablet  Commonly known as: TYLENOL     albuterol sulfate  (90 Base) MCG/ACT inhaler  Commonly known as: PROVENTIL HFA;VENTOLIN HFA;PROAIR HFA     aspirin 81 MG EC tablet  Take 1 tablet by mouth Daily.     bisacodyl 5 MG EC tablet  Commonly known as: DULCOLAX     Breo Ellipta 200-25 MCG/ACT inhaler  Generic drug: Fluticasone Furoate-Vilanterol     carvedilol 6.25 MG tablet  Commonly known as: COREG     cyclobenzaprine 5 MG tablet  Commonly known as: FLEXERIL  Take 1 tablet by mouth At Night As Needed for Muscle Spasms.     escitalopram 10 MG tablet  Commonly known as: LEXAPRO     famotidine 40 MG tablet  Commonly known as: PEPCID     HYDROcodone-acetaminophen 5-325 MG per tablet  Commonly known as: NORCO  Take 1 tablet by mouth Every 6 (Six) Hours As Needed for Moderate Pain.     losartan-hydrochlorothiazide 100-12.5 MG per tablet  Commonly known as: HYZAAR     nitroglycerin 0.4 MG SL tablet  Commonly known as: NITROSTAT     omeprazole 40 MG capsule  Commonly known as: priLOSEC     Potassium 95 MG tablet     simvastatin 10 MG tablet  Commonly known as: ZOCOR     traZODone 50 MG tablet  Commonly known as: DESYREL     Vitamin B12 1000 MCG tablet controlled-release     vitamin D3 125 MCG (5000 UT) capsule capsule                   Buck Kinsey MD  07/19/25 0737        Buck Kinsey MD  07/19/25 1457       Buck Kinsey MD  07/19/25 1533       Buck Kinsey MD  07/19/25 8095

## (undated) DEVICE — ARM SLING II: Brand: DEROYAL

## (undated) DEVICE — Device

## (undated) DEVICE — CONTAINER,SPECIMEN,OR STERILE,4OZ: Brand: MEDLINE

## (undated) DEVICE — FRAZIER SUCTION INSTRUMENT 10 FR W/CONTROL VENT & OBTURATOR: Brand: FRAZIER

## (undated) DEVICE — 1010 S-DRAPE TOWEL DRAPE 10/BX: Brand: STERI-DRAPE™

## (undated) DEVICE — SPNG GZ WOVN 4X4IN 12PLY 10/BX STRL

## (undated) DEVICE — PATIENT RETURN ELECTRODE, SINGLE-USE, CONTACT QUALITY MONITORING, ADULT, WITH 9FT CORD, FOR PATIENTS WEIGING OVER 33LBS. (15KG): Brand: MEGADYNE

## (undated) DEVICE — HOOD, PEEL-AWAY: Brand: FLYTE

## (undated) DEVICE — GAUZE,SPONGE,4"X4",16PLY,XRAY,STRL,LF: Brand: MEDLINE

## (undated) DEVICE — INTENDED USE FOR SURGICAL MARKING ON INTACT SKIN, ALSO PROVIDES A PERMANENT METHOD OF IDENTIFYING OBJECTS IN THE OPERATING ROOM: Brand: WRITESITE® REGULAR TIP SKIN MARKER

## (undated) DEVICE — T-MAX DISPOSABLE FACE MASK 8 PER BOX

## (undated) DEVICE — HOOD: Brand: FLYTE

## (undated) DEVICE — GLV SURG SENSICARE W/ALOE PF LF 7 STRL

## (undated) DEVICE — INTENDED FOR TISSUE SEPARATION, AND OTHER PROCEDURES THAT REQUIRE A SHARP SURGICAL BLADE TO PUNCTURE OR CUT.: Brand: BARD-PARKER ® STAINLESS STEEL BLADES

## (undated) DEVICE — 3M™ STERI-DRAPE™ U-DRAPE 1015: Brand: STERI-DRAPE™

## (undated) DEVICE — SPNG LAP 18X18IN LF STRL PK/5

## (undated) DEVICE — TRAP FLD MINIVAC MEGADYNE 100ML

## (undated) DEVICE — 3M™ STERI-DRAPE™ INSTRUMENT POUCH 1018: Brand: STERI-DRAPE™

## (undated) DEVICE — COAXIAL FEMORAL CANAL TIP

## (undated) DEVICE — SOL ISO/ALC RUB 70PCT 4OZ

## (undated) DEVICE — DRP Z/FRICTION 10X16IN

## (undated) DEVICE — HANDPIECE SET WITH COAXIAL HIGH FLOW TIP AND SUCTION TUBE: Brand: INTERPULSE

## (undated) DEVICE — SYS CLS SKIN PREMIERPRO EXOFINFUSION 22CM

## (undated) DEVICE — COVER,TABLE,HEAVY DUTY,79"X110",STRL: Brand: MEDLINE

## (undated) DEVICE — DUAL CUT SAGITTAL BLADE

## (undated) DEVICE — TOWEL,OR,DSP,ST,BLUE,STD,4/PK,20PK/CS: Brand: MEDLINE

## (undated) DEVICE — APPL CHLORAPREP HI/LITE 26ML ORNG

## (undated) DEVICE — TRANSPOSAL ULTRAFLEX DUO/QUAD ULTRA CART MANIFOLD

## (undated) DEVICE — SUT VIC 0 CT1 CR8 18IN J840D

## (undated) DEVICE — DRSNG SURESITE WNDW 4X4.5

## (undated) DEVICE — 1016 S-DRAPE IRRIG POUCH 10/BOX: Brand: STERI-DRAPE™

## (undated) DEVICE — GLV SURG SENSICARE PI PF LF 7 GRN STRL

## (undated) DEVICE — COUNT NDL MAG FM/BLCK 40COUNT

## (undated) DEVICE — ULTRACLEAN ACCESSORY ELECTRODE 1" (2.54 CM) COATED NEEDLE: Brand: ULTRACLEAN

## (undated) DEVICE — PREP SOL POVIDONE/IODINE BT 4OZ

## (undated) DEVICE — GLV SURG SENSICARE PI LF PF 7.0

## (undated) DEVICE — SUT VIC 2/0 CP2 CR8 18IN J762D

## (undated) DEVICE — DRSNG TELFA PAD NONADH STR 1S 3X8IN

## (undated) DEVICE — SHEET, ORTHO, SPLIT, STERILE: Brand: MEDLINE

## (undated) DEVICE — NDL SPINE 22G 31/2IN BLK

## (undated) DEVICE — MAYO STAND COVER: Brand: CONVERTORS

## (undated) DEVICE — DECANT BG O JET

## (undated) DEVICE — BOWL AND CEMENT CARTRIDGE WITH BREAKAWAY FEMORAL NOZZLE: Brand: ACM

## (undated) DEVICE — DISPOSABLE CEMENT SCULPS

## (undated) DEVICE — 3M™ IOBAN™ 2 ANTIMICROBIAL INCISE DRAPE 6650EZ: Brand: IOBAN™ 2

## (undated) DEVICE — BOWL PLSTC LG 32OZ BLU STRL

## (undated) DEVICE — PK SHLDR ARTHSCP 90

## (undated) DEVICE — PK CYSTO 90

## (undated) DEVICE — SYR CONTRL LUERLOK 10CC

## (undated) DEVICE — FEMORAL CANAL PRESSURIZER WITHOUT HUB, MEDIUM

## (undated) DEVICE — GLV SURG NEOLON 2G PF LF 7.5 STRL

## (undated) DEVICE — IRRIGATOR BULB ASEPTO 60CC STRL

## (undated) DEVICE — PENCL E/S ULTRAVAC TELESCP NOSE HOLSTR 10FT

## (undated) DEVICE — MEDI-VAC YANK SUCT HNDL W/TPRD BULBOUS TIP: Brand: CARDINAL HEALTH

## (undated) DEVICE — ELECTRD NDL EZ CLN MOD 2.75IN